# Patient Record
Sex: MALE | Race: WHITE
[De-identification: names, ages, dates, MRNs, and addresses within clinical notes are randomized per-mention and may not be internally consistent; named-entity substitution may affect disease eponyms.]

---

## 2017-01-12 ENCOUNTER — HOSPITAL ENCOUNTER (EMERGENCY)
Dept: HOSPITAL 47 - EC | Age: 67
Discharge: HOME | End: 2017-01-12
Payer: COMMERCIAL

## 2017-01-12 VITALS — RESPIRATION RATE: 16 BRPM | DIASTOLIC BLOOD PRESSURE: 77 MMHG | HEART RATE: 74 BPM | SYSTOLIC BLOOD PRESSURE: 158 MMHG

## 2017-01-12 VITALS — TEMPERATURE: 98 F

## 2017-01-12 DIAGNOSIS — Z79.82: ICD-10-CM

## 2017-01-12 DIAGNOSIS — Z85.828: ICD-10-CM

## 2017-01-12 DIAGNOSIS — Z98.61: ICD-10-CM

## 2017-01-12 DIAGNOSIS — Z79.899: ICD-10-CM
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
## 2017-01-12 DIAGNOSIS — M54.5: Primary | ICD-10-CM

## 2017-01-12 LAB
PH UR: 5.5 [PH] (ref 5–8)
SP GR UR: 1.01 (ref 1–1.03)
UA BILLING (MACRO VS. MICRO): (no result)
UROBILINOGEN UR QL STRIP: <2 MG/DL (ref ?–2)

## 2017-01-12 PROCEDURE — 99283 EMERGENCY DEPT VISIT LOW MDM: CPT

## 2017-01-12 PROCEDURE — 81003 URINALYSIS AUTO W/O SCOPE: CPT

## 2017-01-12 NOTE — ED
General Adult HPI





- General


Chief complaint: Back Pain/Injury


Stated complaint: Fall-Back Injury


Time Seen by Provider: 01/12/17 19:07


Source: patient, family, RN notes reviewed


Mode of arrival: wheelchair


Limitations: no limitations





- History of Present Illness


Initial comments: 





66-year-old male presenting for right lower back pain.  Patient states that he 

tripped while he was getting out of his van and fell hitting the side step on 

side of his vehicle.  He was able to ambulate afterwards.  However since this 

time he said significant pain in the right lower back.  Denies any radiation 

the pain in the legs.  He denies any saddle paresthesias or urinary 

incontinence.  He denies any head or neck injury or loss of consciousness.  He 

is not on any blood thinners.





- Related Data


 Home Medications











 Medication  Instructions  Recorded  Confirmed


 


Atenolol 100 mg PO BID 06/08/15 01/12/17


 


Benazepril HCl 40 mg PO DAILY 06/08/15 01/12/17


 


Aspirin 325 mg PO HS 01/12/17 01/12/17


 


Hydrochlorothiazide 25 mg PO DAILY 01/12/17 01/12/17








 Previous Rx's











 Medication  Instructions  Recorded


 


Diazepam [Valium] 5 mg PO BID PRN #8 tab 01/12/17


 


HYDROcodone/APAP 5-325MG [Norco 1 tab PO Q6HR PRN #12 tab 01/12/17





5-325]  


 


Ibuprofen [Motrin] 800 mg PO Q8HR PRN #21 tab 01/12/17











 Allergies











Allergy/AdvReac Type Severity Reaction Status Date / Time


 


No Known Allergies Allergy   Verified 01/12/17 19:19














Review of Systems


ROS Statement: 


Those systems with pertinent positive or pertinent negative responses have been 

documented in the HPI.





ROS Other: All systems not noted in ROS Statement are negative.





Past Medical History


Past Medical History: Cancer, Hypertension


Additional Past Medical History / Comment(s): HX SKIN CA, CURRENT LESION ON 

LEFT CHEEK


History of Any Multi-Drug Resistant Organisms: None Reported


Past Surgical History: Back Surgery, Heart Catheterization, Joint Replacement, 

Orthopedic Surgery


Additional Past Surgical History / Comment(s): PREVIOUS SKIN CA LESIONS REMOVED

, HIP REPLACEMENT, ANKLE ORIF WITH METAL


Past Anesthesia/Blood Transfusion Reactions: No Reported Reaction


Past Psychological History: No Psychological Hx Reported


Smoking Status: Never smoker


Past Alcohol Use History: Daily


Past Drug Use History: None Reported





- Past Family History


  ** Mother


Family Medical History: Cancer, Deep Vein Thrombosis (DVT)


Additional Family Medical History / Comment(s): BREAST CA





General Exam





- General Exam Comments


Initial Comments: 





General:  Awake and Alert. No acute distress. Does not appear acutely ill.


Eyes: AYSHA, EOM intact. No nystagmus. No scleral icterus.  


HENT: Atraumatic, normocephalic. Mucous membranes moist. Trachea midline.


Neck: The neck is supple, there is no tenderness or JVD.  


Cardiovascular:  Regular rate and rhythm. No murmur, rub, or gallop is 

appreciated. Distal pulses intact.


Respiratory:  Lungs are clear to auscultation bilaterally. No wheezes, rales, 

rhonchi. No respiratory distress.


Gastrointestinal:  Soft, Nontender. No rebound or guarding. Non-distended. No 

masses or organomegaly noted.  Right flank tenderness.


Musculoskeletal:  Right lower back tenderness with spasm otherwise MSK exam 

with normal ROM. No gross deformity. No strength deficits. 


Neurological: A&Ox3. CN II-XII grossly intact, There are no obvious motor or 

sensory deficits. Coordination appears grossly intact. Speech is normal.  Gait 

intact.  No saddle paresthesias.


Skin: Skin is warm and dry and no rashes or lesions are noted. 


Psychiatric: Cooperative, appropriate mood & affect, normal judgment.  


Limitations: no limitations





Course


 Vital Signs











  01/12/17 01/12/17





  19:00 20:15


 


Temperature 98.0 F 


 


Pulse Rate 51 L 74


 


Respiratory 18 16





Rate  


 


Blood Pressure 184/86 158/77


 


O2 Sat by Pulse 99 97





Oximetry  














Medical Decision Making





- Medical Decision Making





66-year-old male presenting for right lower back injury.  Patient with muscle 

spasm associated with this.  There is no significant force directly in the 

spine requiring imaging at this time.  However, UA performed to rule out 

hematuria for possible kidney injury.  This is negative.  Patient was given 

pain medicine and muscle relaxer with improvement of his symptoms.  Discussed 

no heavy lifting.  Discussed heat, NSAIDs, stretching.  Discussed close follow-

up with his PCP.  Discussed concerning signs and symptoms for immediate return 

to ED.  Patient and wife are agreeable with plan of discharge home.  Patient 

was able to ambulate without issue.





- Lab Data


 Lab Results











  01/12/17 Range/Units





  19:41 


 


Urine Color  Yellow  


 


Urine Appearance  Clear  (Clear)  


 


Urine pH  5.5  (5.0-8.0)  


 


Ur Specific Gravity  1.008  (1.001-1.035)  


 


Urine Protein  Negative  (Negative)  


 


Urine Glucose (UA)  Negative  (Negative)  


 


Urine Ketones  Negative  (Negative)  


 


Urine Blood  Negative  (Negative)  


 


Urine Nitrate  Negative  (Negative)  


 


Urine Bilirubin  Negative  (Negative)  


 


Urine Urobilinogen  <2.0  (<2.0)  mg/dL


 


Ur Leukocyte Esterase  Negative  (Negative)  














Disposition


Clinical Impression: 


 Right low back pain, Fall





Disposition: HOME SELF-CARE


Condition: Stable


Instructions:  Acute Low Back Pain (ED)


Prescriptions: 


Diazepam [Valium] 5 mg PO BID PRN #8 tab


 PRN Reason: back spasm


HYDROcodone/APAP 5-325MG [Norco 5-325] 1 tab PO Q6HR PRN #12 tab


 PRN Reason: Pain


Ibuprofen [Motrin] 800 mg PO Q8HR PRN #21 tab


 PRN Reason: Pain


Referrals: 


Mina Mata DO [Primary Care Provider] - 1-2 days


Time of Disposition: 20:06

## 2017-06-12 ENCOUNTER — HOSPITAL ENCOUNTER (OUTPATIENT)
Dept: HOSPITAL 47 - RADXRMAIN | Age: 67
Discharge: HOME | End: 2017-06-12
Payer: MEDICARE

## 2017-06-12 DIAGNOSIS — J20.8: Primary | ICD-10-CM

## 2017-06-12 PROCEDURE — 71020: CPT

## 2017-06-12 NOTE — XR
EXAMINATION TYPE: XR chest 2V

 

DATE OF EXAM: 6/12/2017

 

COMPARISON: NONE

 

HISTORY: Pneumonia, acute bronchitis, J 20.8

 

TECHNIQUE:  Frontal and lateral views of the chest are obtained.

 

FINDINGS:  Patient is rotated. Airspace disease is present in the right lower lobe. Prominent lung vo
lumes may be indicative of COPD. No pneumothorax or pleural effusion. Heart size accentuated by rotat
ion. There may be a spinal curvature.

 

IMPRESSION:  Findings compatible with right lower lobe pneumonia, follow-up to resolution.

## 2017-12-19 ENCOUNTER — HOSPITAL ENCOUNTER (OUTPATIENT)
Dept: HOSPITAL 47 - RADXRMAIN | Age: 67
Discharge: HOME | End: 2017-12-19
Payer: MEDICARE

## 2017-12-19 DIAGNOSIS — R91.8: Primary | ICD-10-CM

## 2017-12-19 PROCEDURE — 71020: CPT

## 2017-12-19 NOTE — XR
EXAMINATION TYPE: XR chest 2V

 

DATE OF EXAM: 12/19/2017

 

COMPARISON: 12/18/2017

 

TECHNIQUE: PA and lateral views submitted with nipple markers.

 

HISTORY: Cough

 

FINDINGS:

The lungs are clear and  there is no pneumothorax, pleural effusion, or focal pneumonia.  Hypertrophi
c and degenerative change of the spine noted. Linear change right upper lobe scar or atelectasis.

 

Nodularity noted in the previous exam corresponds to nipple marker\shadow.

Hypertrophic degenerative change of the spine with compression deformities appearing to be stable.

 

IMPRESSION: 

1. No acute process.

2. Nodularity at the right lung base corresponds to the nipple marker.

## 2018-03-20 ENCOUNTER — HOSPITAL ENCOUNTER (OUTPATIENT)
Dept: HOSPITAL 47 - LABWHC1 | Age: 68
Discharge: HOME | End: 2018-03-20
Attending: OTOLARYNGOLOGY
Payer: MEDICARE

## 2018-03-20 DIAGNOSIS — I10: ICD-10-CM

## 2018-03-20 DIAGNOSIS — R00.1: ICD-10-CM

## 2018-03-20 DIAGNOSIS — R94.31: Primary | ICD-10-CM

## 2018-03-20 PROCEDURE — 36415 COLL VENOUS BLD VENIPUNCTURE: CPT

## 2018-03-20 PROCEDURE — 93005 ELECTROCARDIOGRAM TRACING: CPT

## 2019-08-02 ENCOUNTER — HOSPITAL ENCOUNTER (OUTPATIENT)
Dept: HOSPITAL 47 - RADXRMAIN | Age: 69
End: 2019-08-02
Attending: FAMILY MEDICINE
Payer: MEDICARE

## 2019-08-02 DIAGNOSIS — I10: Primary | ICD-10-CM

## 2019-08-02 PROCEDURE — 71046 X-RAY EXAM CHEST 2 VIEWS: CPT

## 2019-08-02 NOTE — XR
EXAMINATION TYPE: XR chest 2V

 

DATE OF EXAM: 8/2/2019

 

COMPARISON: 12/19/2017

 

TECHNIQUE: PA and lateral views submitted.

 

HISTORY: Annual physical

 

FINDINGS:

The lungs are clear and  there is no pneumothorax, pleural effusion, or focal pneumonia.  There is re
duced inspiration. No overt failure. Biapical pleural thickening. Arthropathy of the shoulders. Hyper
trophic change of the spine. Chronic appearing wedge deformity of the thoracolumbar junction stable.

 

IMPRESSION: 

1. No acute process.

## 2019-08-21 ENCOUNTER — HOSPITAL ENCOUNTER (OUTPATIENT)
Dept: HOSPITAL 47 - RADCTMAIN | Age: 69
Discharge: HOME | End: 2019-08-21
Attending: OTOLARYNGOLOGY
Payer: MEDICARE

## 2019-08-21 DIAGNOSIS — R22.1: Primary | ICD-10-CM

## 2019-08-21 LAB — BUN SERPL-SCNC: 12 MG/DL (ref 9–20)

## 2019-08-21 PROCEDURE — 82565 ASSAY OF CREATININE: CPT

## 2019-08-21 PROCEDURE — 84520 ASSAY OF UREA NITROGEN: CPT

## 2019-08-21 PROCEDURE — 70491 CT SOFT TISSUE NECK W/DYE: CPT

## 2019-08-21 PROCEDURE — 36415 COLL VENOUS BLD VENIPUNCTURE: CPT

## 2019-08-22 NOTE — CT
EXAMINATION TYPE: CT soft tissue neck w con

 

DATE OF EXAM: 8/21/2019

 

HISTORY: LT side neck mass. Pt says its grown and now is painful and causing him issues. Marked w/BB.


 

COMPARISON: NONE

 

CT DLP: 671.30 mGycm.  Automated Exposure Control for Dose Reduction was Utilized.

 

TECHNIQUE:  CT scan of the neck is performed with IV Contrast, patient injected with 100 mL of Isovue
 300, axial images are obtained, coronal and sagittal reformatted images are reviewed.

 

FINDINGS:

 

Airway: There is spiculated right upper lobe mass measuring 3.8 x 2.2 cm anteriorly axial image 19. T
here is second spiculated right upper lobe nodule measuring 2.1 x 1.8 cm axial image 25.

 

Parotid/submandibular glands:  No gross abnormality seen.

 

Carotid/Vascular Structures: Mild calcified plaque bilateral carotid bulbs without significant stenos
is. Incidental dominant left vertebral artery. 

 

Osseous Structures: Mild-to-moderate multilevel spurring. There is mild to moderate anterior compress
ion deformity T6 level sagittal image 48 favor chronic. 

 

Other: Corresponding to palpable abnormality axial image 58 there is cystic lesion left neck posterio
r cervical triangle adjacent to the C4 vertebra posterior to SCM measuring 4.8 x 4.2 cm AP diameter b
y 3.7 cm craniocaudal diameter and sagittal image 66. The lesion has some rim and septal enhancement 
with slight loculation. Lesion is posterior lateral to the carotid bifurcation. There are prominent b
ut subcentimeter bilateral neck lymph nodes seen superior to this. There is however suspicious left n
erik low dense lesion inferior lateral to this measuring 1.8 x 1.6 cm a level of vocal cord just anter
ior to the scapula, Hounsfield units average 25.

 

IMPRESSION:

1. There are 2 suspicious right upper lung nodules and/or masses worrisome for metastatic malignancy.
 Advise follow-up contrast enhanced chest CT to evaluate for possible additional nodules and/or thora
cic adenopathy.

2. Confirmation of mass level of palpable abnormality left neck posterior cervical triangle. Differen
tial includes lymphangioma and infected brachial cleft cyst however neoplasm needs to be considered. 
Necrotic lymph node and neurogenic tumor are in the differential. Follow-up advised. Metastatic disea
se suspected. Consider PET CT follow-up. Consider sampling of left supraclavicular lesion for tissue 
analysis.

## 2019-08-30 ENCOUNTER — HOSPITAL ENCOUNTER (OUTPATIENT)
Dept: HOSPITAL 47 - RADCTMAIN | Age: 69
Discharge: HOME | End: 2019-08-30
Attending: OTOLARYNGOLOGY
Payer: MEDICARE

## 2019-08-30 DIAGNOSIS — R91.8: Primary | ICD-10-CM

## 2019-08-30 PROCEDURE — 71260 CT THORAX DX C+: CPT

## 2019-08-30 NOTE — CT
EXAMINATION TYPE: CT chest w con

 

DATE OF EXAM: 8/30/2019

 

COMPARISON: CT neck 8/21/2019

 

HISTORY: Lung Mass

 

CT DLP: 423.3 mGycm, Automated exposure control for dose reduction was used.

 

CONTRAST: Performed injected with 100 mL of Isovue 300.

 

TECHNIQUE: Axial images were obtained at 5 mm thick sections.  Reconstructed images are reviewed on [employer] computer in the coronal plane. 

 

FINDINGS: Portion of the thyroid visualized is normal.

 

There is a 4.1 x 2.9 cm spiculated density within the anterior right upper lobe. Series 4 image 15. L
obular nodule spiculated margins measuring 2.3 x 2.0 cm cyst in the right peripheral upper lobe. Seri
es 4 image 13. There are some streak opacities which connect these 2 areas. Coronary artery calcifica
tion is present. No suspicious enlarged mediastinal or hilar lymphadenopathy is evident.

 

No enlarged mediastinal or hilar adenopathy is evident.   The ascending aorta diameter at the level o
f the main pulmonary artery is 3.7 cm.  The main pulmonary artery diameter at the bifurcation is 2.1 
cm.

 

Limited CT sections are obtained through the upper abdomen. Abdomen is essentially unremarkable.

 

IMPRESSIONS:

1. 2 suspicious spiculated masses within the right upper lobe. Additional workup for neoplasm is rafiq
mmended.

## 2019-09-11 ENCOUNTER — HOSPITAL ENCOUNTER (EMERGENCY)
Dept: HOSPITAL 47 - EC | Age: 69
LOS: 1 days | Discharge: HOME | End: 2019-09-12
Payer: MEDICARE

## 2019-09-11 VITALS — RESPIRATION RATE: 18 BRPM

## 2019-09-11 DIAGNOSIS — Z96.649: ICD-10-CM

## 2019-09-11 DIAGNOSIS — Z79.899: ICD-10-CM

## 2019-09-11 DIAGNOSIS — E87.1: ICD-10-CM

## 2019-09-11 DIAGNOSIS — I10: ICD-10-CM

## 2019-09-11 DIAGNOSIS — Z79.82: ICD-10-CM

## 2019-09-11 DIAGNOSIS — R91.8: ICD-10-CM

## 2019-09-11 DIAGNOSIS — Z85.828: ICD-10-CM

## 2019-09-11 DIAGNOSIS — Z95.5: ICD-10-CM

## 2019-09-11 DIAGNOSIS — J40: Primary | ICD-10-CM

## 2019-09-11 LAB
ALBUMIN SERPL-MCNC: 4.2 G/DL (ref 3.5–5)
ALP SERPL-CCNC: 49 U/L (ref 38–126)
ALT SERPL-CCNC: 26 U/L (ref 21–72)
ANION GAP SERPL CALC-SCNC: 17 MMOL/L
APTT BLD: 21.4 SEC (ref 22–30)
AST SERPL-CCNC: 41 U/L (ref 17–59)
BASOPHILS # BLD AUTO: 0.1 K/UL (ref 0–0.2)
BASOPHILS NFR BLD AUTO: 1 %
BUN SERPL-SCNC: 10 MG/DL (ref 9–20)
CALCIUM SPEC-MCNC: 8.6 MG/DL (ref 8.4–10.2)
CHLORIDE SERPL-SCNC: 91 MMOL/L (ref 98–107)
CO2 SERPL-SCNC: 21 MMOL/L (ref 22–30)
EOSINOPHIL # BLD AUTO: 0.1 K/UL (ref 0–0.7)
EOSINOPHIL NFR BLD AUTO: 1 %
ERYTHROCYTE [DISTWIDTH] IN BLOOD BY AUTOMATED COUNT: 4.66 M/UL (ref 4.3–5.9)
ERYTHROCYTE [DISTWIDTH] IN BLOOD: 17.1 % (ref 11.5–15.5)
GLUCOSE SERPL-MCNC: 95 MG/DL (ref 74–99)
HCT VFR BLD AUTO: 33.4 % (ref 39–53)
HGB BLD-MCNC: 10.2 GM/DL (ref 13–17.5)
INR PPP: 0.9 (ref ?–1.2)
LYMPHOCYTES # SPEC AUTO: 1.4 K/UL (ref 1–4.8)
LYMPHOCYTES NFR SPEC AUTO: 16 %
MCH RBC QN AUTO: 21.9 PG (ref 25–35)
MCHC RBC AUTO-ENTMCNC: 30.5 G/DL (ref 31–37)
MCV RBC AUTO: 71.6 FL (ref 80–100)
MONOCYTES # BLD AUTO: 0.5 K/UL (ref 0–1)
MONOCYTES NFR BLD AUTO: 6 %
NEUTROPHILS # BLD AUTO: 6.5 K/UL (ref 1.3–7.7)
NEUTROPHILS NFR BLD AUTO: 74 %
PLATELET # BLD AUTO: 326 K/UL (ref 150–450)
POTASSIUM SERPL-SCNC: 3.7 MMOL/L (ref 3.5–5.1)
PROT SERPL-MCNC: 7.8 G/DL (ref 6.3–8.2)
PT BLD: 10.2 SEC (ref 9–12)
SODIUM SERPL-SCNC: 129 MMOL/L (ref 137–145)
WBC # BLD AUTO: 8.8 K/UL (ref 3.8–10.6)

## 2019-09-11 PROCEDURE — 71046 X-RAY EXAM CHEST 2 VIEWS: CPT

## 2019-09-11 PROCEDURE — 83880 ASSAY OF NATRIURETIC PEPTIDE: CPT

## 2019-09-11 PROCEDURE — 99285 EMERGENCY DEPT VISIT HI MDM: CPT

## 2019-09-11 PROCEDURE — 85610 PROTHROMBIN TIME: CPT

## 2019-09-11 PROCEDURE — 85730 THROMBOPLASTIN TIME PARTIAL: CPT

## 2019-09-11 PROCEDURE — 93005 ELECTROCARDIOGRAM TRACING: CPT

## 2019-09-11 PROCEDURE — 94640 AIRWAY INHALATION TREATMENT: CPT

## 2019-09-11 PROCEDURE — 85025 COMPLETE CBC W/AUTO DIFF WBC: CPT

## 2019-09-11 PROCEDURE — 36415 COLL VENOUS BLD VENIPUNCTURE: CPT

## 2019-09-11 PROCEDURE — 80053 COMPREHEN METABOLIC PANEL: CPT

## 2019-09-11 PROCEDURE — 84484 ASSAY OF TROPONIN QUANT: CPT

## 2019-09-11 PROCEDURE — 96360 HYDRATION IV INFUSION INIT: CPT

## 2019-09-11 NOTE — ED
SOB HPI





- General


Chief Complaint: Shortness of Breath


Stated Complaint: Diff Breathing


Time Seen by Provider: 19 22:07


Source: EMS


Mode of arrival: EMS





- History of Present Illness


Initial Comments: 





This patient is a 69-year-old man who presents to be evaluated for which he is 

terming a coughing jag.  Patient states that approximately 2 hours before 

arrival, while at the bar, he developed "coughing jag," which triggered an 

episode of vomiting and he also felt like he was not able to catch his breath.  

When it continued he spoke with his wife and they decided to come here for 

evaluation.  The patient's pulmonary history is notable that he is been worked 

up for possible lung mass, and has appointments coming with pulmonologist as 

well as ENT for a neck mass.  The patient denies fever or chills.  He does have 

sporadic cough but states not really productive.  No chest pain area no real 

dyspnea other than during the coughing episode.  No leg pain or swelling.  No 

change in bowel movements or urination.


MD Complaint: shortness of breath, cough


Onset/Timin


-: hour(s)


Severity scale (1-10): 0


Improves With: oxygen


Worsens With: nothing


Associated Symptoms: denies other symptoms


Treatments Prior to Arrival: none





- Related Data


                                Home Medications











 Medication  Instructions  Recorded  Confirmed


 


Benazepril HCl 40 mg PO DAILY 06/08/15 09/11/19


 


Aspirin 325 mg PO HS 17


 


Hydrochlorothiazide 25 mg PO DAILY 17


 


Atenolol [Tenormin] 100 mg PO BID 19








                                  Previous Rx's











 Medication  Instructions  Recorded


 


Albuterol Inhaler [Ventolin Hfa 1 - 2 puff INHALATION Q6HR PRN #1 19





Inhaler] inhaler 


 


predniSONE 40 mg PO DAILY #30 tab 19











                                    Allergies











Allergy/AdvReac Type Severity Reaction Status Date / Time


 


No Known Allergies Allergy   Verified 19 22:23














Review of Systems


ROS Statement: 


Those systems with pertinent positive or pertinent negative responses have been 

documented in the HPI.





ROS Other: All systems not noted in ROS Statement are negative.


Constitutional: Denies: fever, chills


ENT: Denies: throat pain


Respiratory: Reports: as per HPI, cough, dyspnea.  Denies: wheezes, hemoptysis


Cardiovascular: Denies: chest pain, palpitations, dyspnea on exertion, 

orthopnea, edema, syncope


Gastrointestinal: Reports: as per HPI, vomiting.  Denies: abdominal pain, 

nausea, melena, hematochezia


Genitourinary: Denies: dysuria, hematuria


Musculoskeletal: Denies: back pain


Skin: Denies: rash


Neurological: Denies: headache, weakness, numbness





Past Medical History


Past Medical History: Cancer, Hypertension


Additional Past Medical History / Comment(s): HX SKIN CA, CURRENT LESION ON LEFT

CHEEK


History of Any Multi-Drug Resistant Organisms: None Reported


Past Surgical History: Back Surgery, Heart Catheterization, Joint Replacement, 

Orthopedic Surgery


Additional Past Surgical History / Comment(s): PREVIOUS SKIN CA LESIONS REMOVED,

HIP REPLACEMENT, ANKLE ORIF WITH METAL


Past Anesthesia/Blood Transfusion Reactions: No Reported Reaction


Past Psychological History: No Psychological Hx Reported


Smoking Status: Never smoker


Past Alcohol Use History: Daily


Past Drug Use History: None Reported





- Past Family History


  ** Mother


Family Medical History: Cancer, Deep Vein Thrombosis (DVT)


Additional Family Medical History / Comment(s): BREAST CA





General Exam


General appearance: alert, in no apparent distress


Head exam: Present: atraumatic, normocephalic


Eye exam: Present: normal appearance.  Absent: scleral icterus, conjunctival 

injection


ENT exam: Present: normal oropharynx


Neck exam: Present: other (The patient has an approximately 6 cm x 3 cm palpable

mass at the base of the neck on the left side.  There is some erythema and sca

ling overlying.  There is no real tenderness.)


Respiratory exam: Present: wheezes.  Absent: respiratory distress, rales, 

rhonchi, stridor, accessory muscle use, decreased breath sounds, prolonged 

expiratory


Cardiovascular Exam: Present: regular rate, normal rhythm, normal heart sounds. 

Absent: systolic murmur, diastolic murmur, rubs, gallop


GI/Abdominal exam: Present: soft.  Absent: distended, tenderness, guarding, 

rebound, rigid, mass


Extremities exam: Present: normal inspection, normal capillary refill.  Absent: 

pedal edema, calf tenderness


Back exam: Present: normal inspection.  Absent: CVA tenderness (R), CVA 

tenderness (L)


Neurological exam: Present: alert


Skin exam: Present: warm, dry, intact, normal color.  Absent: rash





Course


                                   Vital Signs











  19





  22:09 22:45 23:13


 


Temperature 98.3 F  


 


Pulse Rate 82  88


 


Respiratory 18 20 18





Rate   


 


Blood Pressure 169/110  


 


O2 Sat by Pulse 98  





Oximetry   














  19





  23:19


 


Temperature 


 


Pulse Rate 90


 


Respiratory 18





Rate 


 


Blood Pressure 


 


O2 Sat by Pulse 





Oximetry 














Medical Decision Making





- Lab Data


Result diagrams: 


                                 19 22:50





                                 19 22:50


                                   Lab Results











  19 Range/Units





  22:50 22:50 22:50 


 


WBC  8.8    (3.8-10.6)  k/uL


 


RBC  4.66    (4.30-5.90)  m/uL


 


Hgb  10.2 L    (13.0-17.5)  gm/dL


 


Hct  33.4 L    (39.0-53.0)  %


 


MCV  71.6 L    (80.0-100.0)  fL


 


MCH  21.9 L    (25.0-35.0)  pg


 


MCHC  30.5 L    (31.0-37.0)  g/dL


 


RDW  17.1 H    (11.5-15.5)  %


 


Plt Count  326    (150-450)  k/uL


 


Neutrophils %  74    %


 


Lymphocytes %  16    %


 


Monocytes %  6    %


 


Eosinophils %  1    %


 


Basophils %  1    %


 


Neutrophils #  6.5    (1.3-7.7)  k/uL


 


Lymphocytes #  1.4    (1.0-4.8)  k/uL


 


Monocytes #  0.5    (0-1.0)  k/uL


 


Eosinophils #  0.1    (0-0.7)  k/uL


 


Basophils #  0.1    (0-0.2)  k/uL


 


Hypochromasia  Moderate    


 


Anisocytosis  Slight    


 


Microcytosis  Moderate    


 


PT     (9.0-12.0)  sec


 


INR     (<1.2)  


 


APTT     (22.0-30.0)  sec


 


Sodium   129 L   (137-145)  mmol/L


 


Potassium   3.7   (3.5-5.1)  mmol/L


 


Chloride   91 L   ()  mmol/L


 


Carbon Dioxide   21 L   (22-30)  mmol/L


 


Anion Gap   17   mmol/L


 


BUN   10   (9-20)  mg/dL


 


Creatinine   0.83   (0.66-1.25)  mg/dL


 


Est GFR (CKD-EPI)AfAm   >90   (>60 ml/min/1.73 sqM)  


 


Est GFR (CKD-EPI)NonAf   >90   (>60 ml/min/1.73 sqM)  


 


Glucose   95   (74-99)  mg/dL


 


Calcium   8.6   (8.4-10.2)  mg/dL


 


Total Bilirubin   0.6   (0.2-1.3)  mg/dL


 


AST   41   (17-59)  U/L


 


ALT   26   (21-72)  U/L


 


Alkaline Phosphatase   49   ()  U/L


 


Troponin I     (0.000-0.034)  ng/mL


 


NT-Pro-B Natriuret Pep    110  pg/mL


 


Total Protein   7.8   (6.3-8.2)  g/dL


 


Albumin   4.2   (3.5-5.0)  g/dL














  19 Range/Units





  22:50 22:50 


 


WBC    (3.8-10.6)  k/uL


 


RBC    (4.30-5.90)  m/uL


 


Hgb    (13.0-17.5)  gm/dL


 


Hct    (39.0-53.0)  %


 


MCV    (80.0-100.0)  fL


 


MCH    (25.0-35.0)  pg


 


MCHC    (31.0-37.0)  g/dL


 


RDW    (11.5-15.5)  %


 


Plt Count    (150-450)  k/uL


 


Neutrophils %    %


 


Lymphocytes %    %


 


Monocytes %    %


 


Eosinophils %    %


 


Basophils %    %


 


Neutrophils #    (1.3-7.7)  k/uL


 


Lymphocytes #    (1.0-4.8)  k/uL


 


Monocytes #    (0-1.0)  k/uL


 


Eosinophils #    (0-0.7)  k/uL


 


Basophils #    (0-0.2)  k/uL


 


Hypochromasia    


 


Anisocytosis    


 


Microcytosis    


 


PT  10.2   (9.0-12.0)  sec


 


INR  0.9   (<1.2)  


 


APTT  21.4 L   (22.0-30.0)  sec


 


Sodium    (137-145)  mmol/L


 


Potassium    (3.5-5.1)  mmol/L


 


Chloride    ()  mmol/L


 


Carbon Dioxide    (22-30)  mmol/L


 


Anion Gap    mmol/L


 


BUN    (9-20)  mg/dL


 


Creatinine    (0.66-1.25)  mg/dL


 


Est GFR (CKD-EPI)AfAm    (>60 ml/min/1.73 sqM)  


 


Est GFR (CKD-EPI)NonAf    (>60 ml/min/1.73 sqM)  


 


Glucose    (74-99)  mg/dL


 


Calcium    (8.4-10.2)  mg/dL


 


Total Bilirubin    (0.2-1.3)  mg/dL


 


AST    (17-59)  U/L


 


ALT    (21-72)  U/L


 


Alkaline Phosphatase    ()  U/L


 


Troponin I   <0.012  (0.000-0.034)  ng/mL


 


NT-Pro-B Natriuret Pep    pg/mL


 


Total Protein    (6.3-8.2)  g/dL


 


Albumin    (3.5-5.0)  g/dL














- EKG Data


EKG shows normal: sinus rhythm, axis (Normal), intervals (Normal), ST-T waves 

(Normal)


Rate: normal (Rate 85 bpm)


Interpretation: other (Q waves in lead 3 for possible old inferior infarct.)





Disposition


Clinical Impression: 


 Bronchitis, Lung mass, Hyponatremia





Disposition: HOME SELF-CARE


Condition: Good


Instructions (If sedation given, give patient instructions):  Bronchospasm (ED),

Hyponatremia (ED)


Prescriptions: 


predniSONE 40 mg PO DAILY #30 tab


Albuterol Inhaler [Ventolin Hfa Inhaler] 1 - 2 puff INHALATION Q6HR PRN #1 

inhaler


 PRN Reason: Wheezing


Is patient prescribed a controlled substance at d/c from ED?: No


Referrals: 


Mina Mata DO [Primary Care Provider] - 1-2 days

## 2019-09-11 NOTE — XR
EXAM:

  XR Chest, 2 Views

 

CLINICAL HISTORY:

  ITS.REASON XR Reason: difficulty breathing

 

TECHNIQUE:

  Frontal and lateral views of the chest.

 

COMPARISON:

  08/02/19

 

FINDINGS:

  Lungs:  Low lung volumes.  Streaky density in the right upper lung is 

likely scarring.

  Pleural space:  Unremarkable.  No pneumothorax.

  Heart:  Unremarkable.  No cardiomegaly.

  Mediastinum:  Unremarkable.

  Bones/joints:  Unremarkable.

 

IMPRESSION:     

  No acute findings.

## 2019-09-12 VITALS — HEART RATE: 79 BPM | SYSTOLIC BLOOD PRESSURE: 141 MMHG | TEMPERATURE: 98 F | DIASTOLIC BLOOD PRESSURE: 83 MMHG

## 2019-09-18 ENCOUNTER — HOSPITAL ENCOUNTER (OUTPATIENT)
Dept: HOSPITAL 47 - CPPFTMAIN | Age: 69
Discharge: HOME | End: 2019-09-18
Attending: INTERNAL MEDICINE
Payer: MEDICARE

## 2019-09-18 DIAGNOSIS — J44.9: Primary | ICD-10-CM

## 2019-09-18 PROCEDURE — 94060 EVALUATION OF WHEEZING: CPT

## 2019-09-18 PROCEDURE — 94726 PLETHYSMOGRAPHY LUNG VOLUMES: CPT

## 2019-09-18 PROCEDURE — 94729 DIFFUSING CAPACITY: CPT

## 2019-09-26 ENCOUNTER — HOSPITAL ENCOUNTER (OUTPATIENT)
Dept: HOSPITAL 47 - ORWHC2ENDO | Age: 69
Discharge: HOME | End: 2019-09-26
Attending: INTERNAL MEDICINE
Payer: MEDICARE

## 2019-09-26 VITALS — DIASTOLIC BLOOD PRESSURE: 70 MMHG | HEART RATE: 79 BPM | SYSTOLIC BLOOD PRESSURE: 114 MMHG

## 2019-09-26 VITALS — RESPIRATION RATE: 18 BRPM

## 2019-09-26 VITALS — TEMPERATURE: 97.5 F

## 2019-09-26 DIAGNOSIS — R22.1: ICD-10-CM

## 2019-09-26 DIAGNOSIS — C44.41: ICD-10-CM

## 2019-09-26 DIAGNOSIS — C34.11: Primary | ICD-10-CM

## 2019-09-26 DIAGNOSIS — Z96.641: ICD-10-CM

## 2019-09-26 DIAGNOSIS — Z79.51: ICD-10-CM

## 2019-09-26 DIAGNOSIS — I10: ICD-10-CM

## 2019-09-26 DIAGNOSIS — Z77.090: ICD-10-CM

## 2019-09-26 DIAGNOSIS — Z79.82: ICD-10-CM

## 2019-09-26 DIAGNOSIS — Z79.899: ICD-10-CM

## 2019-09-26 DIAGNOSIS — J42: ICD-10-CM

## 2019-09-26 DIAGNOSIS — J84.10: ICD-10-CM

## 2019-09-26 PROCEDURE — 31627 NAVIGATIONAL BRONCHOSCOPY: CPT

## 2019-09-26 PROCEDURE — 31628 BRONCHOSCOPY/LUNG BX EACH: CPT

## 2019-09-26 PROCEDURE — 71250 CT THORAX DX C-: CPT

## 2019-09-26 PROCEDURE — 88305 TISSUE EXAM BY PATHOLOGIST: CPT

## 2019-09-26 PROCEDURE — 31625 BRONCHOSCOPY W/BIOPSY(S): CPT

## 2019-09-26 PROCEDURE — 71045 X-RAY EXAM CHEST 1 VIEW: CPT

## 2019-09-26 NOTE — CT
EXAMINATION TYPE: CT Chest wo con [facility] Protocol

 

DATE OF EXAM: 9/26/2019

 

COMPARISON: Soft tissue neck 8/21/2019, CT chest 8/30/2019

 

HISTORY: pulmonary nodule

 

CT DLP: 524 mGycm, Automated exposure control for dose reduction was used.

 

CONTRAST: Performed injected with 0 mL of Isovue 300.

 

TECHNIQUE: Axial images were obtained at 5 mm thick sections.  Reconstructed images are reviewed on [employer] computer in the coronal plane. 

 

FINDINGS: Portion of the thyroid visualized is normal.

 

There is a 2.0 x 2.2 cm lobulated mass in the right apex. Series 11 image 18 a spiculated mass measur
ing 2.6 cm in diameters and the anterior right upper lung field. Series 11 image 21. There is a large
 left supraclavicular hypodensity likely is an enlarged metastatic lymph node measuring 4.6 cm. Serie
s 10 image 1. Additional lymph node in the supraclavicular region on the left measuring 1.5 cm. Serie
s 10 image 4. Enlarged mediastinal lymph nodes are not evident. No suspicious hilar lymph nodes are e
vident

 

No enlarged mediastinal or hilar adenopathy is evident.   The ascending aorta diameter at the level o
f the main pulmonary artery is 3.9 cm.  The main pulmonary artery diameter at the bifurcation is 2.4 
cm. Coronary artery calcifications present.

 

Limited CT sections are obtained through the upper abdomen. Abdomen is essentially unremarkable.

 

IMPRESSIONS:

1. 2 masses right upper lobe.

2. Enlarged left supraclavicular adenopathy. The largest measuring 4.6 cm is only partially visualize
d.

## 2019-09-26 NOTE — XR
EXAMINATION TYPE: XR chest 1V portable

 

DATE OF EXAM: 9/26/2019

 

COMPARISON: Chest x-ray 9/11/2019

 

HISTORY: Right upper lobe lung mass, status post lung biopsy

 

TECHNIQUE: Single frontal view of the chest is obtained.

 

FINDINGS:  Patchy density in the right upper lobe is again noted. There is no evident pneumothorax. L
skyla volumes are low and the patient is rotated. No evident effusion. Heart is stable.

 

IMPRESSION:  No evident complication status post lung biopsy.

## 2019-10-04 ENCOUNTER — HOSPITAL ENCOUNTER (INPATIENT)
Dept: HOSPITAL 47 - EC | Age: 69
LOS: 3 days | Discharge: HOME | DRG: 312 | End: 2019-10-07
Attending: INTERNAL MEDICINE | Admitting: INTERNAL MEDICINE
Payer: MEDICARE

## 2019-10-04 VITALS — BODY MASS INDEX: 29.5 KG/M2

## 2019-10-04 DIAGNOSIS — R59.9: ICD-10-CM

## 2019-10-04 DIAGNOSIS — T50.2X5A: ICD-10-CM

## 2019-10-04 DIAGNOSIS — T46.4X5A: ICD-10-CM

## 2019-10-04 DIAGNOSIS — M19.90: ICD-10-CM

## 2019-10-04 DIAGNOSIS — I95.2: Primary | ICD-10-CM

## 2019-10-04 DIAGNOSIS — I25.10: ICD-10-CM

## 2019-10-04 DIAGNOSIS — Z85.828: ICD-10-CM

## 2019-10-04 DIAGNOSIS — Z80.3: ICD-10-CM

## 2019-10-04 DIAGNOSIS — Z77.090: ICD-10-CM

## 2019-10-04 DIAGNOSIS — J98.11: ICD-10-CM

## 2019-10-04 DIAGNOSIS — Z79.899: ICD-10-CM

## 2019-10-04 DIAGNOSIS — C76.0: ICD-10-CM

## 2019-10-04 DIAGNOSIS — C34.11: ICD-10-CM

## 2019-10-04 DIAGNOSIS — Z83.2: ICD-10-CM

## 2019-10-04 DIAGNOSIS — K21.9: ICD-10-CM

## 2019-10-04 DIAGNOSIS — R05: ICD-10-CM

## 2019-10-04 DIAGNOSIS — R55: ICD-10-CM

## 2019-10-04 DIAGNOSIS — Z96.641: ICD-10-CM

## 2019-10-04 DIAGNOSIS — Z79.82: ICD-10-CM

## 2019-10-04 DIAGNOSIS — I10: ICD-10-CM

## 2019-10-04 DIAGNOSIS — D64.9: ICD-10-CM

## 2019-10-04 DIAGNOSIS — E61.1: ICD-10-CM

## 2019-10-04 LAB
ALBUMIN SERPL-MCNC: 3.8 G/DL (ref 3.5–5)
ALP SERPL-CCNC: 62 U/L (ref 38–126)
ALT SERPL-CCNC: 39 U/L (ref 21–72)
ANION GAP SERPL CALC-SCNC: 14 MMOL/L
APTT BLD: 22.8 SEC (ref 22–30)
AST SERPL-CCNC: 40 U/L (ref 17–59)
BASOPHILS # BLD AUTO: 0.1 K/UL (ref 0–0.2)
BASOPHILS NFR BLD AUTO: 1 %
BUN SERPL-SCNC: 14 MG/DL (ref 9–20)
CALCIUM SPEC-MCNC: 9.1 MG/DL (ref 8.4–10.2)
CHLORIDE SERPL-SCNC: 97 MMOL/L (ref 98–107)
CO2 SERPL-SCNC: 23 MMOL/L (ref 22–30)
EOSINOPHIL # BLD AUTO: 0.1 K/UL (ref 0–0.7)
EOSINOPHIL NFR BLD AUTO: 1 %
ERYTHROCYTE [DISTWIDTH] IN BLOOD BY AUTOMATED COUNT: 4.45 M/UL (ref 4.3–5.9)
ERYTHROCYTE [DISTWIDTH] IN BLOOD: 16.3 % (ref 11.5–15.5)
GLUCOSE SERPL-MCNC: 110 MG/DL (ref 74–99)
HCT VFR BLD AUTO: 33.3 % (ref 39–53)
HGB BLD-MCNC: 10.1 GM/DL (ref 13–17.5)
HYALINE CASTS UR QL AUTO: 4 /LPF (ref 0–2)
INR PPP: 1 (ref ?–1.2)
LYMPHOCYTES # SPEC AUTO: 1.4 K/UL (ref 1–4.8)
LYMPHOCYTES NFR SPEC AUTO: 14 %
MAGNESIUM SPEC-SCNC: 1.9 MG/DL (ref 1.6–2.3)
MCH RBC QN AUTO: 22.6 PG (ref 25–35)
MCHC RBC AUTO-ENTMCNC: 30.2 G/DL (ref 31–37)
MCV RBC AUTO: 74.8 FL (ref 80–100)
MONOCYTES # BLD AUTO: 0.6 K/UL (ref 0–1)
MONOCYTES NFR BLD AUTO: 6 %
NEUTROPHILS # BLD AUTO: 7.5 K/UL (ref 1.3–7.7)
NEUTROPHILS NFR BLD AUTO: 75 %
PH UR: 6.5 [PH] (ref 5–8)
PLATELET # BLD AUTO: 354 K/UL (ref 150–450)
POTASSIUM SERPL-SCNC: 3.8 MMOL/L (ref 3.5–5.1)
PROT SERPL-MCNC: 7.2 G/DL (ref 6.3–8.2)
PT BLD: 10.3 SEC (ref 9–12)
RBC UR QL: 97 /HPF (ref 0–5)
SODIUM SERPL-SCNC: 134 MMOL/L (ref 137–145)
SP GR UR: 1.01 (ref 1–1.03)
SQUAMOUS UR QL AUTO: 1 /HPF (ref 0–4)
UROBILINOGEN UR QL STRIP: <2 MG/DL (ref ?–2)
WBC # BLD AUTO: 9.9 K/UL (ref 3.8–10.6)

## 2019-10-04 PROCEDURE — 70553 MRI BRAIN STEM W/O & W/DYE: CPT

## 2019-10-04 PROCEDURE — 83605 ASSAY OF LACTIC ACID: CPT

## 2019-10-04 PROCEDURE — 99285 EMERGENCY DEPT VISIT HI MDM: CPT

## 2019-10-04 PROCEDURE — 82728 ASSAY OF FERRITIN: CPT

## 2019-10-04 PROCEDURE — 85025 COMPLETE CBC W/AUTO DIFF WBC: CPT

## 2019-10-04 PROCEDURE — 84484 ASSAY OF TROPONIN QUANT: CPT

## 2019-10-04 PROCEDURE — 82272 OCCULT BLD FECES 1-3 TESTS: CPT

## 2019-10-04 PROCEDURE — 83735 ASSAY OF MAGNESIUM: CPT

## 2019-10-04 PROCEDURE — 83540 ASSAY OF IRON: CPT

## 2019-10-04 PROCEDURE — 82746 ASSAY OF FOLIC ACID SERUM: CPT

## 2019-10-04 PROCEDURE — 85730 THROMBOPLASTIN TIME PARTIAL: CPT

## 2019-10-04 PROCEDURE — 70450 CT HEAD/BRAIN W/O DYE: CPT

## 2019-10-04 PROCEDURE — 80053 COMPREHEN METABOLIC PANEL: CPT

## 2019-10-04 PROCEDURE — 71275 CT ANGIOGRAPHY CHEST: CPT

## 2019-10-04 PROCEDURE — 71046 X-RAY EXAM CHEST 2 VIEWS: CPT

## 2019-10-04 PROCEDURE — 82607 VITAMIN B-12: CPT

## 2019-10-04 PROCEDURE — 74177 CT ABD & PELVIS W/CONTRAST: CPT

## 2019-10-04 PROCEDURE — 85610 PROTHROMBIN TIME: CPT

## 2019-10-04 PROCEDURE — 96360 HYDRATION IV INFUSION INIT: CPT

## 2019-10-04 PROCEDURE — 87040 BLOOD CULTURE FOR BACTERIA: CPT

## 2019-10-04 PROCEDURE — 93005 ELECTROCARDIOGRAM TRACING: CPT

## 2019-10-04 PROCEDURE — 36415 COLL VENOUS BLD VENIPUNCTURE: CPT

## 2019-10-04 PROCEDURE — 83010 ASSAY OF HAPTOGLOBIN QUANT: CPT

## 2019-10-04 PROCEDURE — 94760 N-INVAS EAR/PLS OXIMETRY 1: CPT

## 2019-10-04 PROCEDURE — 83615 LACTATE (LD) (LDH) ENZYME: CPT

## 2019-10-04 PROCEDURE — 93306 TTE W/DOPPLER COMPLETE: CPT

## 2019-10-04 PROCEDURE — 87086 URINE CULTURE/COLONY COUNT: CPT

## 2019-10-04 PROCEDURE — 86850 RBC ANTIBODY SCREEN: CPT

## 2019-10-04 PROCEDURE — 83550 IRON BINDING TEST: CPT

## 2019-10-04 PROCEDURE — 85379 FIBRIN DEGRADATION QUANT: CPT

## 2019-10-04 PROCEDURE — 86901 BLOOD TYPING SEROLOGIC RH(D): CPT

## 2019-10-04 PROCEDURE — 86900 BLOOD TYPING SEROLOGIC ABO: CPT

## 2019-10-04 PROCEDURE — 81001 URINALYSIS AUTO W/SCOPE: CPT

## 2019-10-04 RX ADMIN — CEFAZOLIN SCH MLS/HR: 330 INJECTION, POWDER, FOR SOLUTION INTRAMUSCULAR; INTRAVENOUS at 22:29

## 2019-10-04 RX ADMIN — ASPIRIN 325 MG ORAL TABLET SCH MG: 325 PILL ORAL at 22:30

## 2019-10-04 RX ADMIN — ATENOLOL SCH MG: 50 TABLET ORAL at 22:30

## 2019-10-04 NOTE — CT
EXAMINATION TYPE: CT brain wo con

 

DATE OF EXAM: 10/4/2019

 

COMPARISON: 6/21/2016

 

HISTORY: Syncope, Lung CA

 

CT DLP: 1080.4 mGycm

Automated exposure control for dose reduction was used.

 

FINDINGS: 

There is mild cerebral cortical atrophy. There is no mass effect nor midline shift. There is no sign 
of intracranial hemorrhage. Calvarium is intact. I see no bony destructive process. There is no evide
nce of cerebral edema.

 

IMPRESSION: 

MILD ATROPHY. NO ACUTE INTRACRANIAL ABNORMALITY. NO CHANGE.

## 2019-10-04 NOTE — CT
EXAMINATION TYPE: CT chest angio for PE

 

DATE OF EXAM: 10/4/2019

 

COMPARISON: 8/30/2019

 

HISTORY: syncopal episode, elevated d-dimer, lung ca

 

CT DLP: 492.5 mGycm

Automated exposure control for dose reduction was used.

 

CONTRAST: 

CT Chest for pulmonary embolism performed with with IV Contrast, patient injected with 78cc mL of Iso
glenna 370.

 

FINDINGS:

 

There are 3-D post processed images.

There is a 3.5 cm somewhat spiculated infiltrate at the anterior superior aspect of the right pulmona
ry hilum. There is some linear infiltrate and nodularity in the lateral right upper lobe. Nodule dario
ures 2 cm. There is no mediastinal adenopathy. Thoracic aorta is intact. There is no aneurysm or diss
ection. I see no filling defects in the pulmonary arteries. There is minimal linear density at the le
ft lung base. There is no pleural effusion. There is no pericardial effusion. Heart size is normal.

IMPRESSION:

No evidence of pulmonary embolism.

 

Right upper lobe mass unchanged. Second larger right upper lobe mass at the superior right pulmonary 
hilum is increased compared to last exam and suggests progression of tumor.

 

There is some new patchy atelectasis left posterior lung base compared to old exam.

## 2019-10-04 NOTE — ED
General Adult HPI





- General


Chief complaint: Syncope


Stated complaint: near syncope


Time Seen by Provider: 10/04/19 16:35


Source: patient, family, EMS


Mode of arrival: EMS


Limitations: no limitations





- History of Present Illness


Initial comments: 





Patient presents to the ED by ambulance for evaluation with his wife at bedside.

 Per wife, the patient was sent over from his pulmonologist's clinic today.  

Patient reports feeling intermittently dizzy for the past few days, and he 

states that he became dizzy again earlier today.  Per EMS, the patient had a 

syncopal episode while in the ambulance.  Per wife, the patient had a lung 

biopsy performed last Thursday, and he was diagnosed with lung cancer today.  

Patient also admits to having dark stools for the past few days.  Patient denies

having any pain, trauma or injury, fever or chills, headache, focal neuro 

deficit, chest pain, dyspnea, cough or cold symptoms, palpitations, abdominal 

pain, nausea or vomiting, diarrhea, dysuria or urinary symptoms, or any other 

symptoms or complaints.





- Related Data


                                Home Medications











 Medication  Instructions  Recorded  Confirmed


 


Benazepril HCl 40 mg PO DAILY 06/08/15 10/04/19


 


Aspirin 325 mg PO HS 01/12/17 10/04/19


 


Hydrochlorothiazide 25 mg PO DAILY 01/12/17 10/04/19


 


Atenolol [Tenormin] 100 mg PO BID 09/11/19 10/04/19


 


Albuterol Inhaler [Ventolin Hfa 1 - 2 puff INHALATION RT-Q4H PRN 10/04/19 

10/04/19





Inhaler]   


 


Fluticasone/Vilanterol [Breo 1 puff INHALATION RT-DAILY 10/04/19 10/04/19





Ellipta 200-25 Mcg INH]   











                                    Allergies











Allergy/AdvReac Type Severity Reaction Status Date / Time


 


No Known Allergies Allergy   Verified 10/04/19 16:57














Review of Systems


ROS Statement: 


Those systems with pertinent positive or pertinent negative responses have been 

documented in the HPI.





ROS Other: All systems not noted in ROS Statement are negative.





Past Medical History


Past Medical History: Cancer, Hypertension


Additional Past Medical History / Comment(s): HX SKIN CA, CURRENT LESION ON LEFT

CHEEK


History of Any Multi-Drug Resistant Organisms: None Reported


Past Surgical History: Back Surgery, Heart Catheterization, Joint Replacement, 

Orthopedic Surgery


Additional Past Surgical History / Comment(s): PREVIOUS SKIN CA LESIONS REMOVED,

HIP REPLACEMENT, ANKLE ORIF WITH METAL


Past Anesthesia/Blood Transfusion Reactions: No Reported Reaction


Past Psychological History: No Psychological Hx Reported


Smoking Status: Never smoker


Past Alcohol Use History: Daily


Past Drug Use History: None Reported





- Past Family History


  ** Mother


Family Medical History: Cancer, Deep Vein Thrombosis (DVT)


Additional Family Medical History / Comment(s): BREAST CA





General Exam


Limitations: no limitations


General appearance: alert, in no apparent distress


Head exam: Present: atraumatic, normocephalic


Eye exam: Present: normal appearance, PERRL, EOMI


ENT exam: Present: mucous membranes moist


Respiratory exam: Present: normal lung sounds bilaterally.  Absent: respiratory 

distress, wheezes, rales, rhonchi


Cardiovascular Exam: Present: normal rhythm, bradycardia, normal heart sounds, 

other (Weak, but palpable radial pulses bilaterally)


GI/Abdominal exam: Present: soft.  Absent: distended, tenderness, guarding


Rectal exam: Present: normal rectal tone, other (Brown stool present in the 

rectal vault; ED RN was present during rectal examination).  Absent: tenderness


Neurological exam: Present: alert, oriented X3.  Absent: motor sensory deficit


Psychiatric exam: Present: normal affect, normal mood


Skin exam: Present: warm, dry, intact, pallor





Course


                                   Vital Signs











  10/04/19 10/04/19 10/04/19





  16:29 17:19 18:41


 


Temperature 98.4 F  98.5 F


 


Pulse Rate 60 64 71


 


Respiratory 26 H 20 20





Rate   


 


Blood Pressure 70/47 92/57 105/64


 


O2 Sat by Pulse 100 96 99





Oximetry   














  10/04/19





  19:45


 


Temperature 98.3 F


 


Pulse Rate 69


 


Respiratory 15





Rate 


 


Blood Pressure 123/78


 


O2 Sat by Pulse 100





Oximetry 














- Reevaluation(s)


Reevaluation #1: 





10/04/19 19:40


Case, H&P, test results and ED management thus far were discussed with Dr. Earl who accepts hospital floor admission.  He asked to place a consultation 

order for Dr. Bundy (pulmonology).  He has no further recommendations at this

time.


10/04/19 20:03


Patient states that he is feeling much better now, and he denies development of 

any new symptoms while in the ED.  Patient's blood pressure has now improved.  

Patient and wife are aware of the patient's test results, and they both agree 

with hospital admission at this time.





EKG Findings





- EKG Comments:


EKG Findings:: Sinus bradycardia, ventricular rate of 54 bpm, normal KS and QRS 

intervals, normal QTc interval, normal axis, no ST or T-wave abnormality





Medical Decision Making





- Medical Decision Making





Patient was initially hypotensive on arrival to the ED, but he was hydrated with

a liter of normal saline, and his blood pressure has now improved.  Patient is 

now feeling better.  Patient's stool is occult blood negative.  Patient's CT 

angiogram chest is negative for PE.  Given the patient's syncopal episode and 

transient hypotension, will admit the patient to the hospital for further 

evaluation and monitoring.





- Lab Data


Result diagrams: 


                                 10/04/19 16:44





                                 10/04/19 16:44


                                   Lab Results











  10/04/19 10/04/19 10/04/19 Range/Units





  16:40 16:44 16:44 


 


WBC   9.9   (3.8-10.6)  k/uL


 


RBC   4.45   (4.30-5.90)  m/uL


 


Hgb   10.1 L   (13.0-17.5)  gm/dL


 


Hct   33.3 L   (39.0-53.0)  %


 


MCV   74.8 L   (80.0-100.0)  fL


 


MCH   22.6 L   (25.0-35.0)  pg


 


MCHC   30.2 L   (31.0-37.0)  g/dL


 


RDW   16.3 H   (11.5-15.5)  %


 


Plt Count   354   (150-450)  k/uL


 


Neutrophils %   75   %


 


Lymphocytes %   14   %


 


Monocytes %   6   %


 


Eosinophils %   1   %


 


Basophils %   1   %


 


Neutrophils #   7.5   (1.3-7.7)  k/uL


 


Lymphocytes #   1.4   (1.0-4.8)  k/uL


 


Monocytes #   0.6   (0-1.0)  k/uL


 


Eosinophils #   0.1   (0-0.7)  k/uL


 


Basophils #   0.1   (0-0.2)  k/uL


 


Hypochromasia   Marked   


 


Anisocytosis   Slight   


 


Microcytosis   Slight   


 


PT     (9.0-12.0)  sec


 


INR     (<1.2)  


 


APTT     (22.0-30.0)  sec


 


D-Dimer     (<0.60)  mg/L FEU


 


Sodium    134 L  (137-145)  mmol/L


 


Potassium    3.8  (3.5-5.1)  mmol/L


 


Chloride    97 L  ()  mmol/L


 


Carbon Dioxide    23  (22-30)  mmol/L


 


Anion Gap    14  mmol/L


 


BUN    14  (9-20)  mg/dL


 


Creatinine    1.39 H  (0.66-1.25)  mg/dL


 


Est GFR (CKD-EPI)AfAm    60  (>60 ml/min/1.73 sqM)  


 


Est GFR (CKD-EPI)NonAf    52  (>60 ml/min/1.73 sqM)  


 


Glucose    110 H  (74-99)  mg/dL


 


Lactic Ac Sepsis Rflx     


 


Plasma Lactic Acid Javi     (0.7-2.0)  mmol/L


 


Calcium    9.1  (8.4-10.2)  mg/dL


 


Magnesium    1.9  (1.6-2.3)  mg/dL


 


Total Bilirubin    0.7  (0.2-1.3)  mg/dL


 


AST    40  (17-59)  U/L


 


ALT    39  (21-72)  U/L


 


Alkaline Phosphatase    62  ()  U/L


 


Troponin I     (0.000-0.034)  ng/mL


 


Total Protein    7.2  (6.3-8.2)  g/dL


 


Albumin    3.8  (3.5-5.0)  g/dL


 


Urine Color     


 


Urine Appearance     (Clear)  


 


Urine pH     (5.0-8.0)  


 


Ur Specific Gravity     (1.001-1.035)  


 


Urine Protein     (Negative)  


 


Urine Glucose (UA)     (Negative)  


 


Urine Ketones     (Negative)  


 


Urine Blood     (Negative)  


 


Urine Nitrite     (Negative)  


 


Urine Bilirubin     (Negative)  


 


Urine Urobilinogen     (<2.0)  mg/dL


 


Ur Leukocyte Esterase     (Negative)  


 


Urine RBC     (0-5)  /hpf


 


Urine WBC     (0-5)  /hpf


 


Ur Squamous Epith Cells     (0-4)  /hpf


 


Hyaline Casts     (0-2)  /lpf


 


Urine Mucus     (None)  /hpf


 


Stool Occult Blood     (Negative)  


 


Blood Type  O Positive    


 


Blood Type Confirm     


 


Blood Type Recheck  No Previous Record    


 


Bld Type Recheck Status  CABO Indicated    


 


Antibody Screen  NEGATIVE    


 


Spec Expiration Date  10/07/2019 - 2341    














  10/04/19 10/04/19 10/04/19 Range/Units





  16:44 16:44 16:44 


 


WBC     (3.8-10.6)  k/uL


 


RBC     (4.30-5.90)  m/uL


 


Hgb     (13.0-17.5)  gm/dL


 


Hct     (39.0-53.0)  %


 


MCV     (80.0-100.0)  fL


 


MCH     (25.0-35.0)  pg


 


MCHC     (31.0-37.0)  g/dL


 


RDW     (11.5-15.5)  %


 


Plt Count     (150-450)  k/uL


 


Neutrophils %     %


 


Lymphocytes %     %


 


Monocytes %     %


 


Eosinophils %     %


 


Basophils %     %


 


Neutrophils #     (1.3-7.7)  k/uL


 


Lymphocytes #     (1.0-4.8)  k/uL


 


Monocytes #     (0-1.0)  k/uL


 


Eosinophils #     (0-0.7)  k/uL


 


Basophils #     (0-0.2)  k/uL


 


Hypochromasia     


 


Anisocytosis     


 


Microcytosis     


 


PT  10.3    (9.0-12.0)  sec


 


INR  1.0    (<1.2)  


 


APTT  22.8    (22.0-30.0)  sec


 


D-Dimer     (<0.60)  mg/L FEU


 


Sodium     (137-145)  mmol/L


 


Potassium     (3.5-5.1)  mmol/L


 


Chloride     ()  mmol/L


 


Carbon Dioxide     (22-30)  mmol/L


 


Anion Gap     mmol/L


 


BUN     (9-20)  mg/dL


 


Creatinine     (0.66-1.25)  mg/dL


 


Est GFR (CKD-EPI)AfAm     (>60 ml/min/1.73 sqM)  


 


Est GFR (CKD-EPI)NonAf     (>60 ml/min/1.73 sqM)  


 


Glucose     (74-99)  mg/dL


 


Lactic Ac Sepsis Rflx     


 


Plasma Lactic Acid Javi    2.2 H*  (0.7-2.0)  mmol/L


 


Calcium     (8.4-10.2)  mg/dL


 


Magnesium     (1.6-2.3)  mg/dL


 


Total Bilirubin     (0.2-1.3)  mg/dL


 


AST     (17-59)  U/L


 


ALT     (21-72)  U/L


 


Alkaline Phosphatase     ()  U/L


 


Troponin I   <0.012   (0.000-0.034)  ng/mL


 


Total Protein     (6.3-8.2)  g/dL


 


Albumin     (3.5-5.0)  g/dL


 


Urine Color     


 


Urine Appearance     (Clear)  


 


Urine pH     (5.0-8.0)  


 


Ur Specific Gravity     (1.001-1.035)  


 


Urine Protein     (Negative)  


 


Urine Glucose (UA)     (Negative)  


 


Urine Ketones     (Negative)  


 


Urine Blood     (Negative)  


 


Urine Nitrite     (Negative)  


 


Urine Bilirubin     (Negative)  


 


Urine Urobilinogen     (<2.0)  mg/dL


 


Ur Leukocyte Esterase     (Negative)  


 


Urine RBC     (0-5)  /hpf


 


Urine WBC     (0-5)  /hpf


 


Ur Squamous Epith Cells     (0-4)  /hpf


 


Hyaline Casts     (0-2)  /lpf


 


Urine Mucus     (None)  /hpf


 


Stool Occult Blood     (Negative)  


 


Blood Type     


 


Blood Type Confirm     


 


Blood Type Recheck     


 


Bld Type Recheck Status     


 


Antibody Screen     


 


Spec Expiration Date     














  10/04/19 10/04/19 10/04/19 Range/Units





  16:44 16:44 17:18 


 


WBC     (3.8-10.6)  k/uL


 


RBC     (4.30-5.90)  m/uL


 


Hgb     (13.0-17.5)  gm/dL


 


Hct     (39.0-53.0)  %


 


MCV     (80.0-100.0)  fL


 


MCH     (25.0-35.0)  pg


 


MCHC     (31.0-37.0)  g/dL


 


RDW     (11.5-15.5)  %


 


Plt Count     (150-450)  k/uL


 


Neutrophils %     %


 


Lymphocytes %     %


 


Monocytes %     %


 


Eosinophils %     %


 


Basophils %     %


 


Neutrophils #     (1.3-7.7)  k/uL


 


Lymphocytes #     (1.0-4.8)  k/uL


 


Monocytes #     (0-1.0)  k/uL


 


Eosinophils #     (0-0.7)  k/uL


 


Basophils #     (0-0.2)  k/uL


 


Hypochromasia     


 


Anisocytosis     


 


Microcytosis     


 


PT     (9.0-12.0)  sec


 


INR     (<1.2)  


 


APTT     (22.0-30.0)  sec


 


D-Dimer   0.82 H   (<0.60)  mg/L FEU


 


Sodium     (137-145)  mmol/L


 


Potassium     (3.5-5.1)  mmol/L


 


Chloride     ()  mmol/L


 


Carbon Dioxide     (22-30)  mmol/L


 


Anion Gap     mmol/L


 


BUN     (9-20)  mg/dL


 


Creatinine     (0.66-1.25)  mg/dL


 


Est GFR (CKD-EPI)AfAm     (>60 ml/min/1.73 sqM)  


 


Est GFR (CKD-EPI)NonAf     (>60 ml/min/1.73 sqM)  


 


Glucose     (74-99)  mg/dL


 


Lactic Ac Sepsis Rflx    Y  


 


Plasma Lactic Acid Javi     (0.7-2.0)  mmol/L


 


Calcium     (8.4-10.2)  mg/dL


 


Magnesium     (1.6-2.3)  mg/dL


 


Total Bilirubin     (0.2-1.3)  mg/dL


 


AST     (17-59)  U/L


 


ALT     (21-72)  U/L


 


Alkaline Phosphatase     ()  U/L


 


Troponin I     (0.000-0.034)  ng/mL


 


Total Protein     (6.3-8.2)  g/dL


 


Albumin     (3.5-5.0)  g/dL


 


Urine Color     


 


Urine Appearance     (Clear)  


 


Urine pH     (5.0-8.0)  


 


Ur Specific Gravity     (1.001-1.035)  


 


Urine Protein     (Negative)  


 


Urine Glucose (UA)     (Negative)  


 


Urine Ketones     (Negative)  


 


Urine Blood     (Negative)  


 


Urine Nitrite     (Negative)  


 


Urine Bilirubin     (Negative)  


 


Urine Urobilinogen     (<2.0)  mg/dL


 


Ur Leukocyte Esterase     (Negative)  


 


Urine RBC     (0-5)  /hpf


 


Urine WBC     (0-5)  /hpf


 


Ur Squamous Epith Cells     (0-4)  /hpf


 


Hyaline Casts     (0-2)  /lpf


 


Urine Mucus     (None)  /hpf


 


Stool Occult Blood  Negative    (Negative)  


 


Blood Type     


 


Blood Type Confirm     


 


Blood Type Recheck     


 


Bld Type Recheck Status     


 


Antibody Screen     


 


Spec Expiration Date     














  10/04/19 10/04/19 Range/Units





  17:45 19:30 


 


WBC    (3.8-10.6)  k/uL


 


RBC    (4.30-5.90)  m/uL


 


Hgb    (13.0-17.5)  gm/dL


 


Hct    (39.0-53.0)  %


 


MCV    (80.0-100.0)  fL


 


MCH    (25.0-35.0)  pg


 


MCHC    (31.0-37.0)  g/dL


 


RDW    (11.5-15.5)  %


 


Plt Count    (150-450)  k/uL


 


Neutrophils %    %


 


Lymphocytes %    %


 


Monocytes %    %


 


Eosinophils %    %


 


Basophils %    %


 


Neutrophils #    (1.3-7.7)  k/uL


 


Lymphocytes #    (1.0-4.8)  k/uL


 


Monocytes #    (0-1.0)  k/uL


 


Eosinophils #    (0-0.7)  k/uL


 


Basophils #    (0-0.2)  k/uL


 


Hypochromasia    


 


Anisocytosis    


 


Microcytosis    


 


PT    (9.0-12.0)  sec


 


INR    (<1.2)  


 


APTT    (22.0-30.0)  sec


 


D-Dimer    (<0.60)  mg/L FEU


 


Sodium    (137-145)  mmol/L


 


Potassium    (3.5-5.1)  mmol/L


 


Chloride    ()  mmol/L


 


Carbon Dioxide    (22-30)  mmol/L


 


Anion Gap    mmol/L


 


BUN    (9-20)  mg/dL


 


Creatinine    (0.66-1.25)  mg/dL


 


Est GFR (CKD-EPI)AfAm    (>60 ml/min/1.73 sqM)  


 


Est GFR (CKD-EPI)NonAf    (>60 ml/min/1.73 sqM)  


 


Glucose    (74-99)  mg/dL


 


Lactic Ac Sepsis Rflx    


 


Plasma Lactic Acid Javi    (0.7-2.0)  mmol/L


 


Calcium    (8.4-10.2)  mg/dL


 


Magnesium    (1.6-2.3)  mg/dL


 


Total Bilirubin    (0.2-1.3)  mg/dL


 


AST    (17-59)  U/L


 


ALT    (21-72)  U/L


 


Alkaline Phosphatase    ()  U/L


 


Troponin I    (0.000-0.034)  ng/mL


 


Total Protein    (6.3-8.2)  g/dL


 


Albumin    (3.5-5.0)  g/dL


 


Urine Color   Yellow  


 


Urine Appearance   Clear  (Clear)  


 


Urine pH   6.5  (5.0-8.0)  


 


Ur Specific Gravity   1.009  (1.001-1.035)  


 


Urine Protein   Trace H  (Negative)  


 


Urine Glucose (UA)   Negative  (Negative)  


 


Urine Ketones   Negative  (Negative)  


 


Urine Blood   Moderate H  (Negative)  


 


Urine Nitrite   Negative  (Negative)  


 


Urine Bilirubin   Negative  (Negative)  


 


Urine Urobilinogen   <2.0  (<2.0)  mg/dL


 


Ur Leukocyte Esterase   Negative  (Negative)  


 


Urine RBC   97 H  (0-5)  /hpf


 


Urine WBC   16 H  (0-5)  /hpf


 


Ur Squamous Epith Cells   1  (0-4)  /hpf


 


Hyaline Casts   4 H  (0-2)  /lpf


 


Urine Mucus   Rare H  (None)  /hpf


 


Stool Occult Blood    (Negative)  


 


Blood Type    


 


Blood Type Confirm  O Positive   


 


Blood Type Recheck    


 


Bld Type Recheck Status    


 


Antibody Screen    


 


Spec Expiration Date    














- Radiology Data


Radiology results: report reviewed (CT head is negative for acute intracranial 

abnormality; CT angiogram chest is negative for pulmonary embolism, and shows 

right upper lobe masses and patchy left posterior atelectasis), image reviewed 

(Chest x-ray shows poor inspiration and right upper lobe atelectasis)





Disposition


Clinical Impression: 


 Syncope, Anemia





Disposition: ADMITTED AS IP TO THIS [facility]


Condition: Stable


Is patient prescribed a controlled substance at d/c from ED?: No


Time of Disposition: 19:40


Decision Date: 10/04/19


Decision Time: 19:33

## 2019-10-04 NOTE — XR
EXAMINATION TYPE: XR chest 2V

 

DATE OF EXAM: 10/4/2019

 

COMPARISON: 9/26/2019

 

HISTORY: Syncope

 

TECHNIQUE:  Frontal and lateral views of the chest are obtained.

 

FINDINGS:  There is poor inspiration. There is elevated diaphragms. There is mild atelectasis right u
pper lobe. There are chest leads. There is no heart failure.

 

IMPRESSION:  Atelectasis right upper lobe unchanged. Poor inspiration. Inspiration however improved c
ompared to last exam.

## 2019-10-05 LAB
ALBUMIN SERPL-MCNC: 3.4 G/DL (ref 3.5–5)
ALP SERPL-CCNC: 49 U/L (ref 38–126)
ALT SERPL-CCNC: 39 U/L (ref 21–72)
ANION GAP SERPL CALC-SCNC: 7 MMOL/L
AST SERPL-CCNC: 28 U/L (ref 17–59)
BUN SERPL-SCNC: 12 MG/DL (ref 9–20)
CALCIUM SPEC-MCNC: 8.8 MG/DL (ref 8.4–10.2)
CELLS COUNTED: 100
CHLORIDE SERPL-SCNC: 98 MMOL/L (ref 98–107)
CO2 SERPL-SCNC: 31 MMOL/L (ref 22–30)
ERYTHROCYTE [DISTWIDTH] IN BLOOD BY AUTOMATED COUNT: 4.18 M/UL (ref 4.3–5.9)
ERYTHROCYTE [DISTWIDTH] IN BLOOD: 16.2 % (ref 11.5–15.5)
GLUCOSE SERPL-MCNC: 88 MG/DL (ref 74–99)
HCT VFR BLD AUTO: 32.1 % (ref 39–53)
HGB BLD-MCNC: 9.2 GM/DL (ref 13–17.5)
LYMPHOCYTES # BLD MANUAL: 0.68 K/UL (ref 1–4.8)
MCH RBC QN AUTO: 22 PG (ref 25–35)
MCHC RBC AUTO-ENTMCNC: 28.7 G/DL (ref 31–37)
MCV RBC AUTO: 76.8 FL (ref 80–100)
MONOCYTES # BLD MANUAL: 0.42 K/UL (ref 0–1)
NEUTROPHILS NFR BLD MANUAL: 79 %
PLATELET # BLD AUTO: 275 K/UL (ref 150–450)
POTASSIUM SERPL-SCNC: 3.6 MMOL/L (ref 3.5–5.1)
PROT SERPL-MCNC: 6.5 G/DL (ref 6.3–8.2)
SODIUM SERPL-SCNC: 136 MMOL/L (ref 137–145)
WBC # BLD AUTO: 5.2 K/UL (ref 3.8–10.6)

## 2019-10-05 RX ADMIN — BUDESONIDE AND FORMOTEROL FUMARATE DIHYDRATE SCH: 160; 4.5 AEROSOL RESPIRATORY (INHALATION) at 19:51

## 2019-10-05 RX ADMIN — BUDESONIDE AND FORMOTEROL FUMARATE DIHYDRATE SCH: 160; 4.5 AEROSOL RESPIRATORY (INHALATION) at 08:31

## 2019-10-05 RX ADMIN — HEPARIN SODIUM SCH UNIT: 5000 INJECTION, SOLUTION INTRAVENOUS; SUBCUTANEOUS at 21:52

## 2019-10-05 RX ADMIN — CEFAZOLIN SCH MLS/HR: 330 INJECTION, POWDER, FOR SOLUTION INTRAMUSCULAR; INTRAVENOUS at 21:53

## 2019-10-05 RX ADMIN — ATENOLOL SCH MG: 50 TABLET ORAL at 08:39

## 2019-10-05 RX ADMIN — CEFAZOLIN SCH MLS/HR: 330 INJECTION, POWDER, FOR SOLUTION INTRAMUSCULAR; INTRAVENOUS at 16:52

## 2019-10-05 RX ADMIN — ASPIRIN 325 MG ORAL TABLET SCH MG: 325 PILL ORAL at 21:52

## 2019-10-05 RX ADMIN — CEFAZOLIN SCH MLS/HR: 330 INJECTION, POWDER, FOR SOLUTION INTRAMUSCULAR; INTRAVENOUS at 08:39

## 2019-10-05 RX ADMIN — ATENOLOL SCH MG: 50 TABLET ORAL at 21:52
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
## 2019-10-05 NOTE — CT
EXAMINATION TYPE: CT abdomen pelvis w con

 

DATE OF EXAM: 10/5/2019

 

COMPARISON: None

 

HISTORY: lung cancer staging

 

CT DLP: 1250 mGycm

Automated exposure control for dose reduction was used.

 

TECHNIQUE:  Helical acquisition of images was performed from the lung bases through the pelvis.

 

CONTRAST: 

Performed without Oral Contrast and with IV Contrast, patient injected with 100 mL of Isovue 300.

 

FINDINGS: 

 

Multiple axial sections were obtained from the diaphragm to the floor the pelvis with intravenous con
trast. The contrast was Isovue 100 mL.

 

Lung bases show mild scarring and subsegmental atelectasis. There is no pleural effusion. There is no
 pericardial effusion.

 

Stomach is intact. Liver and gallbladder appear normal. Bile ducts are not dilated. Spleen appears no
rmal. There is no sign of pancreatic mass. There is no adrenal mass. Kidneys show satisfactory contra
st opacification. There is no hydronephrosis. There is no retroperitoneal adenopathy. The appendix ap
pears normal. There are few diverticula in the sigmoid colon. There is thickening of the posterior le
ft lateral wall of the urinary bladder.

 

There is no mesenteric edema. There is no ascites or free air. At T12 there is anterior wedging with 
70% loss of height. Unchanged.

 

There is no sign of a bowel obstruction. There is right side gluteal muscle atrophy.

 

IMPRESSION:

There is left side urinary bladder wall thickening that could relate to a tumor. Follow-up recommendlisandro maria

## 2019-10-05 NOTE — ECHOF
Referral Reason:LVF



MEASUREMENTS

--------

HEIGHT: 180.3 cm

WEIGHT: 90.7 kg

BP: 

RVIDd:   3.9 cm     (< 3.3)

IVSd:   1.0 cm     (0.6 - 1.1)

LVIDd:   4.6 cm     (3.9 - 5.3)

LVPWd:   1.2 cm     (0.6 - 1.1)

IVSs:   1.7 cm

LVIDs:   1.9 cm

LVPWs:   1.7 cm

Ao Diam:   3.4 cm     (2.0 - 3.7)

AV Cusp:   1.7 cm     (1.5 - 2.6)

LA Diam:   3.8 cm     (2.7 - 3.8)

MV EXCURSION:   12.495 mm     (> 18.000)

MV EF SLOPE:   27 mm/s     (70 - 150)

EPSS:   1.2 cm

MV E Cj:   0.84 m/s

MV DecT:   212 ms

MV A Cj:   0.78 m/s

MV E/A Ratio:   1.08 

RAP:   5.00 mmHg

RVSP:   10.25 mmHg







FINDINGS

--------

Sinus rhythm.

This was a technically difficult study with suboptimal views.

The left ventricular size is normal.   Left ventricular wall thickness is normal.   Overall left vent
ricular systolic function is normal with, an EF between 55 - 60 %.

The right ventricle is normal in size.

The left atrial size is normal.

The right atrial size is normal.

5.0mg of Lumason was utilized for enhancement of images

The aortic valve is trileaflet and appears structurally normal.

The mitral valve is normal.   Mild mitral regurgitation is present.

The tricuspid valve appears structurally normal.   Mild tricuspid regurgitation present.   Right vent
ricular systolic pressure is normal at < 35 mmHg.

There is no pulmonic regurgitation present.

The aortic root size is normal.

There is no pericardial effusion.



CONCLUSIONS

--------

1. Sinus rhythm.

2. This was a technically difficult study with suboptimal views.

3. The left ventricular size is normal.

4. Left ventricular wall thickness is normal.

5. The right ventricle is normal in size.

6. The left atrial size is normal.

7. The right atrial size is normal.

8. 5.0mg of Lumason was utilized for enhancement of images

9. The aortic valve is trileaflet and appears structurally normal.

10. The mitral valve is normal.

11. Mild mitral regurgitation is present.

12. The tricuspid valve appears structurally normal.

13. Mild tricuspid regurgitation present.

14. Right ventricular systolic pressure is normal at < 35 mmHg.

15. There is no pulmonic regurgitation present.

16. The aortic root size is normal.

17. There is no pericardial effusion.





SONOGRAPHER: Melany Desouza RDCS

## 2019-10-05 NOTE — P.CONS
History of Present Illness





- Reason for Consult


Consult date: 10/05/19


Head and neck cancer, lung cancer





- Chief Complaint


Head and neck CA, syncope





- History of Present Illness


53-year-old  gentleman with a past medical history of hypertension, 

recurrent basal cell carcinoma of skin, arthritis GERD and hypertension, with a 

recent diagnosis of head and neck squamous cell carcinoma and lung cancer has 

been admitted with syncope.


The patient still continues to have some symptoms, continues to be under 

evaluation. Has significant history of coronary artery disease.


As per the oncology history, the patient has recurrent basal cell carcinoma 

which has been removed in the past. Recently he has developed swelling in the 

neck on the left side which has been progressive, was evaluated as out patient 

had a biopsy done which was consistent with squamous cell carcinoma. The patient

had CT staging done which showed lung mass as well as per the patient the biopsy

was consistent with squamous cell carcinoma of the lung. The possibility is to 

separate malignancies versus metastatic disease.


As per the history the patient underwent fine-needle aspiration of the left neck

mass on September 17 which was consistent with squamous cell carcinoma. The 

patient was suggested really biopsy for definitive diagnosis. CT of the chest 

done on 9/26/2019 revealed 2 masses in the right upper lobe and enlarged left 

supraclavicular lymph nodes. The mass was 4.6 cm.


Patient underwent bronchoscopy and the pathology revealed squamous cell 

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


The patient had a repeat CT done which did not reveal any pulmonary embolism 

however has shown increase in the size of the mass especially in the pulmonary 

hilum.





Also new patchy atelectasis is seen.


Oncology has been consulted for further evaluation. Patient was scheduled for 

PET scan as outpatient which is pending.











Past medical history of osteoarthritis, recurrent basal cell carcinoma of skin, 

GERD hypertension.





Past surgical history significant for heart catheterization, orthopedic surgery,

back surgery removal of skin cancer.








Family history significant for cancer in the family, deep vein thrombosis, 

history of breast cancer in family. And mother.





Review of systems negative except as mentioned above.











Review of Systems





Apart from dizziness, fatigue address of the review of systems is negative.





Past Medical History


Past Medical History: Cancer, Hypertension


Additional Past Medical History / Comment(s): HX SKIN CA, CURRENT LESION ON LEFT

CHEEK, lung CA, CA in the lymph nodes


History of Any Multi-Drug Resistant Organisms: None Reported


Past Surgical History: Back Surgery, Heart Catheterization, Joint Replacement, 

Orthopedic Surgery


Additional Past Surgical History / Comment(s): PREVIOUS SKIN CA LESIONS REMOVED,

R HIP REPLACEMENT, ANKLE ORIF WITH METAL L


Past Anesthesia/Blood Transfusion Reactions: Previous Problems w/ Anesthesia


Additional Past Anesthesia/Blood Transfusion Reaction / Comm: Pt woke up 

violently, which is out of characteristic for the pt


Smoking Status: Never smoker


Additional Past Alcohol Use History / Comment(s): Patient is a lifelong 

nonsmoker but had extensive exposure to asbestos and acid at [employer] as 

well as plastic exposure and [employer].





- Past Family History


  ** Mother


Family Medical History: Cancer, Deep Vein Thrombosis (DVT)


Additional Family Medical History / Comment(s): BREAST CA





Medications and Allergies


                                Home Medications











 Medication  Instructions  Recorded  Confirmed  Type


 


Benazepril HCl 40 mg PO DAILY 06/08/15 10/04/19 History


 


Aspirin 325 mg PO HS 01/12/17 10/04/19 History


 


Hydrochlorothiazide 25 mg PO DAILY 01/12/17 10/04/19 History


 


Atenolol [Tenormin] 50 mg PO BID 09/11/19 10/04/19 History


 


Albuterol Inhaler [Ventolin Hfa 1 - 2 puff INHALATION RT-Q4H PRN 10/04/19 

10/04/19 History





Inhaler]    


 


Fluticasone/Vilanterol [Breo 1 puff INHALATION RT-DAILY 10/04/19 10/04/19 

History





Ellipta 200-25 Mcg INH]    








                                    Allergies











Allergy/AdvReac Type Severity Reaction Status Date / Time


 


No Known Allergies Allergy   Verified 10/04/19 21:51














Physical Exam


Vitals: 


                                   Vital Signs











  Temp Pulse Pulse Pulse Pulse Pulse Resp


 


 10/05/19 16:40  98.2 F   65     18


 


 10/05/19 12:00  98.5 F   67  68  80  71  18


 


 10/05/19 09:50     68  80  71 


 


 10/05/19 08:29       


 


 10/05/19 08:00  98.3 F   72     18


 


 10/05/19 04:00  98.1 F   60     18


 


 10/05/19 03:29        18


 


 10/05/19 00:00        18


 


 10/04/19 22:40        18


 


 10/04/19 22:00  98.5 F   76     18


 


 10/04/19 21:24  98.4 F  75      14














  BP BP BP BP BP Pulse Ox


 


 10/05/19 16:40   156/80     98


 


 10/05/19 12:00   132/75     99


 


 10/05/19 09:50    141/79  150/82  148/84 


 


 10/05/19 08:29       98


 


 10/05/19 08:00   129/72     98


 


 10/05/19 04:00   157/81     99


 


 10/05/19 03:29      


 


 10/05/19 00:00      


 


 10/04/19 22:40      


 


 10/04/19 22:00   155/83     100


 


 10/04/19 21:24  113/78      100








                                Intake and Output











 10/05/19 10/05/19 10/05/19





 06:59 14:59 22:59


 


Intake Total   240


 


Balance   240


 


Intake:   


 


  Oral   240


 


Other:   


 


  Voiding Method  Toilet Toilet


 


  # Voids 1 3 


 


  # Bowel Movements   1








The patient appeared well nourished and normally developed. Vital signs as 

documented. Head exam is unremarkable. No scleral icterus or corneal arcus 

noted. Neck is without jugular venous distension, thyromegaly, or carotid 

bruits. Carotid upstrokes are brisk bilaterally. Lungs are clear to auscultation

and percussion. Cardiac exam reveals the PMI to be normally sized and situated. 

Rhythm is regular. First and second heart sounds normal. No murmurs, rubs or 

gallops. Abdominal exam reveals normal bowel sounds, no masses, no organomegaly 

and no aortic enlargement. Extremities are nonedematous and both femoral and 

pedal pulses are normal.





Results


CBC & Chem 7: 


                                 10/05/19 05:35





                                 10/05/19 05:35


Labs: 


                  Abnormal Lab Results - Last 24 Hours (Table)











  10/05/19 10/05/19 Range/Units





  05:35 05:35 


 


RBC  4.18 L   (4.30-5.90)  m/uL


 


Hgb  9.2 L   (13.0-17.5)  gm/dL


 


Hct  32.1 L   (39.0-53.0)  %


 


MCV  76.8 L   (80.0-100.0)  fL


 


MCH  22.0 L   (25.0-35.0)  pg


 


MCHC  28.7 L   (31.0-37.0)  g/dL


 


RDW  16.2 H   (11.5-15.5)  %


 


Lymphocytes # (Manual)  0.68 L   (1.0-4.8)  k/uL


 


Sodium   136 L  (137-145)  mmol/L


 


Carbon Dioxide   31 H  (22-30)  mmol/L


 


Albumin   3.4 L  (3.5-5.0)  g/dL








                      Microbiology - Last 24 Hours (Table)











 10/04/19 19:30 Urine Culture - Preliminary





 Urine,Voided 














Assessment and Plan


Plan: 








Impression and plan:


1. Head and neck malignancy with left neck mass evident on examination with 

superficial lesions: CT documented right upper lobe masses of the lung:


- Had a neck mass and lung masses could possibly be different malignancies 

however can be metastatic as well.


- With both pathology is being squamous cell in origin difficult to 

differentiate.. 


- Nevertheless, patient will need further staging workup with bone scan, MRI 

brain. 


- Radiation oncology consult.


- In my opinion, the patient should start with concurrent chemotherapy and 

radiation for head and neck malignancy with weekly carboplatinum and Taxol which

does have some data head and neck. Another option could be radiation with single

agent carboplatinum and 5-FU. Single agent cetuximab.


- Consideration of triple endoscopy for further evaluation of head and neck 

cancer.


- Will further discuss with radiation oncology. Follow-up with Dr. Posada as 

outpatient





2. Syncope:


- MRI brain.





3.  Basal cell carcinoma of skin:


- Status post multiple resections.





4. Anemia 


- Anemia workup including iron profile, B12 folate ordered.





5. Hypertension. 





Thank you very much for the consult. We will continue to follow. Labs have been 

ordered.





INGRID Marmolejo

## 2019-10-05 NOTE — P.CNPUL
History of Present Illness


Consult date: 10/05/19


Requesting physician: Joel Earl


Reason for consult: other (Near syncope, new diagnosis of squamous cell lung 

cancer)


Chief complaint: Syncope, weakness, diaphoresis


History of present illness: 





This a very pleasant 69 year old gentleman who follows with Dr. Mata as 

his primary care physician.  He has history of hypertension, basal cell skin 

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  He is a lifelong nonsmoker 

though he worked at [employer] was exposed to asbestos, graphite and iron. 

He also worked in a plastics factory.  He had been following with ENT regarding 

a left neck growth that was biopsied 2 and was nondiagnostic.  There were some 

rare atypical epithelial cells suspicious for squamous cell carcinoma.  CAT scan

of the neck revealed lung masses as well.  He was seen in our office originally 

on 09/24/2019.  He had then undergone a navigational bronchoscopy with biopsies 

and this was confirmed to squamous cell carcinoma suspect lung primary.  He was 

scheduled for a PET scan today.  He was in our office yesterday for test results

and the patient developed near syncopal episode was pale and diaphoretic and EMS

was called.  He did have a syncopal episode in the ambulance.  Computed 

tomography scan of the brain revealed mild atrophy.  No acute intracranial 

abnormalities.  CT angiogram ruled out pulmonary embolism.  There was noted 

right upper lobe mass which is unchanged compared to previous.  The second large

right upper lobe mass at the superior right pulmonary hilum is increased 

compared to previous.  Some patchy atelectasis in the left lung base.  EKG 

revealed sinus bradycardia with evidence of a previous inferior wall myocardial 

infarction.  Troponins were negative.  He was anemic at 10.1.  Urine culture is 

pending.  Echocardiogram pending.  He is seen today in consultation on the on 

observation unit.  He is awake and alert in no acute distress.  No further 

episodes of near syncope.  He currently denies any worsening shortness of b

reath, cough or congestion.  No chest pain or palpitations. White count 5.2.  

Hemoglobin 9.2.  Sodium 136.  Potassium 3.6.  Creatinine 1.08.





Review of Systems





REVIEW OF SYSTEMS:


CONSTITUTIONAL: Denies any recent significant weight loss or weight gain.


EYES: Temporary change in vision.


EARS, NOSE, MOUTH, THROAT: Denies headaches, denies sore throat.  Positive 

hoarseness.


CARDIOVASCULAR: Denies chest pain, palpitations positive for syncopal episodes.


RESPIRATORY: Denies shortness of breath, cough, congestion or hemoptysis.


GASTROINTESTINAL: Denies change in appetite, denies abdominal pain


GENITOURINARY: Denies hematuria, denies infections.


MUSKULOSKELETAL: Denies pain, denies swelling.


INTEGUMENTARY: Denies rash, denies eczema.


NEUROLOGICAL: Denies recent memory loss, no recent seizure activity. 


PSYCHIATRIC: Denies anxiety, denies depression.


HEMATOLOGIC/LYMPHATIC: Positive for anemia, denies enlarged lymph nodes.








Past Medical History


Past Medical History: Cancer, Hypertension


Additional Past Medical History / Comment(s): HX SKIN CA, CURRENT LESION ON LEFT

CHEEK, lung CA, CA in the lymph nodes


History of Any Multi-Drug Resistant Organisms: None Reported


Past Surgical History: Back Surgery, Heart Catheterization, Joint Replacement, 

Orthopedic Surgery


Additional Past Surgical History / Comment(s): PREVIOUS SKIN CA LESIONS REMOVED,

R HIP REPLACEMENT, ANKLE ORIF WITH METAL L


Past Anesthesia/Blood Transfusion Reactions: Previous Problems w/ Anesthesia


Additional Past Anesthesia/Blood Transfusion Reaction / Comment(s): Pt woke up 

violently, which is out of characteristic for the pt


Smoking Status: Never smoker


Additional Past Alcohol Use History / Comment(s): Patient is a lifelong 

nonsmoker but had extensive exposure to asbestos and acid at [employer] as 

well as plastic exposure and [employer].





- Past Family History


  ** Mother


Family Medical History: Cancer, Deep Vein Thrombosis (DVT)


Additional Family Medical History / Comment(s): BREAST CA





Medications and Allergies


                                Home Medications











 Medication  Instructions  Recorded  Confirmed  Type


 


Benazepril HCl 40 mg PO DAILY 06/08/15 10/04/19 History


 


Aspirin 325 mg PO HS 01/12/17 10/04/19 History


 


Hydrochlorothiazide 25 mg PO DAILY 01/12/17 10/04/19 History


 


Atenolol [Tenormin] 50 mg PO BID 09/11/19 10/04/19 History


 


Albuterol Inhaler [Ventolin Hfa 1 - 2 puff INHALATION RT-Q4H PRN 10/04/19 

10/04/19 History





Inhaler]    


 


Fluticasone/Vilanterol [Breo 1 puff INHALATION RT-DAILY 10/04/19 10/04/19 

History





Ellipta 200-25 Mcg INH]    








                                    Allergies











Allergy/AdvReac Type Severity Reaction Status Date / Time


 


No Known Allergies Allergy   Verified 10/04/19 21:51














Physical Exam


Vitals: 


                                   Vital Signs











  Temp Pulse Pulse Pulse Pulse Pulse Resp


 


 10/05/19 09:50     68  80  71 


 


 10/05/19 08:29       


 


 10/05/19 08:00  98.3 F   72     18


 


 10/05/19 04:00  98.1 F   60     18


 


 10/05/19 03:29        18


 


 10/05/19 00:00        18


 


 10/04/19 22:40        18


 


 10/04/19 22:00  98.5 F   76     18


 


 10/04/19 21:24  98.4 F  75      14


 


 10/04/19 19:45  98.3 F  69      15


 


 10/04/19 18:41  98.5 F  71      20


 


 10/04/19 17:19   64      20


 


 10/04/19 16:29  98.4 F  60      26 H














  BP BP BP BP BP Pulse Ox


 


 10/05/19 09:50    141/79  150/82  148/84 


 


 10/05/19 08:29       98


 


 10/05/19 08:00   129/72     98


 


 10/05/19 04:00   157/81     99


 


 10/05/19 03:29      


 


 10/05/19 00:00      


 


 10/04/19 22:40      


 


 10/04/19 22:00   155/83     100


 


 10/04/19 21:24  113/78      100


 


 10/04/19 19:45  123/78      100


 


 10/04/19 18:41  105/64      99


 


 10/04/19 17:19  92/57      96


 


 10/04/19 16:29  70/47      100








                                Intake and Output











 10/04/19 10/05/19 10/05/19





 22:59 06:59 14:59


 


Output Total 200  


 


Balance -200  


 


Output:   


 


  Urine 200  


 


Other:   


 


  Voiding Method   Toilet


 


  # Voids 1 1 


 


  Weight 90.718 kg  














GENERAL EXAM: Alert, active, comfortable in no apparent distress.  On room air.


HEAD: Normocephalic.


EYES: Normal reaction of pupils, equal size.


NOSE: Clear with pink turbinates.


THROAT: No erythema or exudates.


NECK: Left-sided neck masses, no JVD.


CHEST: No chest wall deformity.


LUNGS: Equal air entry with no crackles, wheeze, rhonchi or dullness.


CVS: S1 and S2 normal with no audible murmur, regular rhythm.


ABDOMEN: No hepatosplenomegaly, normal bowel sounds, no guarding or rigidity.


SPINE: No scoliosis or deformity


SKIN: No rashes


CENTRAL NERVOUS SYSTEM: No focal deficits, tone is normal in all 4 extremities.


EXTREMITIES: There is no peripheral edema.  No clubbing, no cyanosis.  

Peripheral pulses are intact.





Results





- Laboratory Findings


CBC and BMP: 


                                 10/05/19 05:35





                                 10/05/19 05:35


PT/INR, D-dimer











PT  10.3 sec (9.0-12.0)   10/04/19  16:44    


 


INR  1.0  (<1.2)   10/04/19  16:44    


 


D-Dimer  0.82 mg/L FEU (<0.60)  H  10/04/19  16:44    








Abnormal lab findings: 


                                  Abnormal Labs











  10/04/19 10/04/19 10/04/19





  16:44 16:44 16:44


 


RBC   


 


Hgb  10.1 L  


 


Hct  33.3 L  


 


MCV  74.8 L  


 


MCH  22.6 L  


 


MCHC  30.2 L  


 


RDW  16.3 H  


 


Lymphocytes # (Manual)   


 


D-Dimer   


 


Sodium   134 L 


 


Chloride   97 L 


 


Carbon Dioxide   


 


Creatinine   1.39 H 


 


Glucose   110 H 


 


Plasma Lactic Acid Javi    2.2 H*


 


Albumin   


 


Urine Protein   


 


Urine Blood   


 


Urine RBC   


 


Urine WBC   


 


Hyaline Casts   


 


Urine Mucus   














  10/04/19 10/04/19 10/05/19





  16:44 19:30 05:35


 


RBC    4.18 L


 


Hgb    9.2 L


 


Hct    32.1 L


 


MCV    76.8 L


 


MCH    22.0 L


 


MCHC    28.7 L


 


RDW    16.2 H


 


Lymphocytes # (Manual)    0.68 L


 


D-Dimer  0.82 H  


 


Sodium   


 


Chloride   


 


Carbon Dioxide   


 


Creatinine   


 


Glucose   


 


Plasma Lactic Acid Javi   


 


Albumin   


 


Urine Protein   Trace H 


 


Urine Blood   Moderate H 


 


Urine RBC   97 H 


 


Urine WBC   16 H 


 


Hyaline Casts   4 H 


 


Urine Mucus   Rare H 














  10/05/19





  05:35


 


RBC 


 


Hgb 


 


Hct 


 


MCV 


 


MCH 


 


MCHC 


 


RDW 


 


Lymphocytes # (Manual) 


 


D-Dimer 


 


Sodium  136 L


 


Chloride 


 


Carbon Dioxide  31 H


 


Creatinine 


 


Glucose 


 


Plasma Lactic Acid Javi 


 


Albumin  3.4 L


 


Urine Protein 


 


Urine Blood 


 


Urine RBC 


 


Urine WBC 


 


Hyaline Casts 


 


Urine Mucus 














- Diagnostic Findings


Chest x-ray: image reviewed


CT scan - chest: image reviewed





Assessment and Plan


Assessment: 





Impression:





#1 Syncopal episode of unclear etiology, possible arrhythmia.  Echocardiogram 

pending.  Troponins negative.





#2 Recent diagnosis of squamous cell carcinoma via navigational bronchoscopy on 

09/26/2019.





#3 Left neck masses suspicious for squamous cell carcinoma with two previous FNA

biopsies.





#4 Lifelong nonsmoker.





#5 Hypertension.





#6 History of basal cell skin cancer the left temple with previous excision.





#7 Arthritis.





Plan:





The patient was seen and evaluated by Dr. Boudreaux.  CAT scan, labs and vital 

signs reviewed.  Suspect possible bradycardia or other arrhythmias.  

Echocardiogram pending.  Further workup as an inpatient.  May benefit from 30 

day event monitor post discharge.  He and his wife are aware of his lung biopsy 

results and oncology has been consulted.  He was scheduled for PET scan today, 

to be rescheduled next weekend.  We will continue to follow and make further 

recommendations based on his clinical status.





I, the cosigning physician, performed a history & physical examination of the 

patient. Lungs sounds are clear.  Maintaining good O2 saturations in the 90s on 

room air.  I discussed the assessment and plan of care with my nurse 

practitioner, Lory Garcia. I attest to the above note as dictated by her.


Time with Patient: Greater than 30

## 2019-10-06 RX ADMIN — FAMOTIDINE SCH MG: 20 TABLET, FILM COATED ORAL at 09:23

## 2019-10-06 RX ADMIN — ATENOLOL SCH MG: 50 TABLET ORAL at 21:05

## 2019-10-06 RX ADMIN — HEPARIN SODIUM SCH UNIT: 5000 INJECTION, SOLUTION INTRAVENOUS; SUBCUTANEOUS at 21:05

## 2019-10-06 RX ADMIN — HEPARIN SODIUM SCH UNIT: 5000 INJECTION, SOLUTION INTRAVENOUS; SUBCUTANEOUS at 09:23

## 2019-10-06 RX ADMIN — BUDESONIDE AND FORMOTEROL FUMARATE DIHYDRATE SCH: 160; 4.5 AEROSOL RESPIRATORY (INHALATION) at 19:30

## 2019-10-06 RX ADMIN — ATENOLOL SCH MG: 50 TABLET ORAL at 09:23

## 2019-10-06 RX ADMIN — ASPIRIN 325 MG ORAL TABLET SCH MG: 325 PILL ORAL at 21:05

## 2019-10-06 RX ADMIN — BUDESONIDE AND FORMOTEROL FUMARATE DIHYDRATE SCH: 160; 4.5 AEROSOL RESPIRATORY (INHALATION) at 09:06
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
## 2019-10-06 NOTE — P.PN
Subjective


Progress Note Date: 10/06/19


Principal diagnosis: 





syncopal episode most likely secondary to hypotension or cardiac arrhythmia





This a very pleasant 69 year old gentleman who follows with Dr. Mata as 

his primary care physician.  He has history of hypertension, basal cell skin 

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  He is a lifelong nonsmoker 

though he worked at [employer] was exposed to asbestos, graphite and iron. 

He also worked in a plastics factory.  He had been following with ENT regarding 

a left neck growth that was biopsied 2 and was nondiagnostic.  There were some 

rare atypical epithelial cells suspicious for squamous cell carcinoma.  CAT scan

of the neck revealed lung masses as well.  He was seen in our office originally 

on 09/24/2019.  He had then undergone a navigational bronchoscopy with biopsies 

and this was confirmed to squamous cell carcinoma suspect lung primary.  He was 

scheduled for a PET scan today.  He was in our office yesterday for test results

and the patient developed near syncopal episode was pale and diaphoretic and EMS

was called.  He did have a syncopal episode in the ambulance.  Computed 

tomography scan of the brain revealed mild atrophy.  No acute intracranial 

abnormalities.  CT angiogram ruled out pulmonary embolism.  There was noted 

right upper lobe mass which is unchanged compared to previous.  The second large

right upper lobe mass at the superior right pulmonary hilum is increased 

compared to previous.  Some patchy atelectasis in the left lung base.  EKG 

revealed sinus bradycardia with evidence of a previous inferior wall myocardial 

infarction.  Troponins were negative.  He was anemic at 10.1.  Urine culture is 

pending.  Echocardiogram pending.  He is seen today in consultation on the on 

observation unit.  He is awake and alert in no acute distress.  No further 

episodes of near syncope.  He currently denies any worsening shortness of 

breath, cough or congestion.  No chest pain or palpitations. White count 5.2.  

Hemoglobin 9.2.  Sodium 136.  Potassium 3.6.  Creatinine 1.08.





Patient was reevaluated today on 10/6/2019, seems to be doing fairly well, 

asymptomatic, being considered for discharge in the next 24 hours.  No further 

episodes of syncope or near syncope since admission.had a CT of the abdomen and 

pelvis showed thickening of the wall of the urinary bladder.  This should  be 

addressed on an outpatient basis.may eventually require cystoscopy.  Patient has

no urinary complaints.  Pulmonary-wise is doing well, scheduled to have 

outpatient PET scan, and this should be done by oncology once the patient is 

discharged home.  MRI is scheduled for tomorrow and the patient will likely be 

discharged home tomorrow.











Objective





- Vital Signs


Vital signs: 


                                   Vital Signs











Temp  98.2 F   10/06/19 08:00


 


Pulse  70   10/06/19 08:00


 


Resp  18   10/06/19 08:00


 


BP  159/77   10/06/19 08:00


 


Pulse Ox  96   10/06/19 08:00








                                 Intake & Output











 10/05/19 10/06/19 10/06/19





 18:59 06:59 18:59


 


Intake Total 240  


 


Output Total  500 


 


Balance 240 -500 


 


Weight  83.7 kg 


 


Intake:   


 


  Oral 240  


 


Output:   


 


  Urine  500 


 


Other:   


 


  Voiding Method Toilet Toilet Toilet


 


  # Voids 3 2 


 


  # Bowel Movements 1  














- Exam





Physical Exam: Revealed a 69-year-old white male pleasant in no distress.


Head: Atraumatic normocephalic.


HEENT:[Neck is supple.] [No neck masses.] [No thyromegaly.] [No JVD.]PERRLA, 

EOMI, no icterus.


Chest: [Clear throughout, no crackles, no rhonchi, no wheezes.]


Cardiac Exam: [Normal S1 and S2, no S3 gallop, no murmur.]


Abdomen: [Soft, nontender,  no megaly, no rebound, no guarding, normal bowel 

sounds.]


Extremities: [No clubbing, no edema, no cyanosis.]


Neurological Exam: [No focal neurologic deficit.]


musculoskeletal: Normal range of motion no deformities, muscle strength intact 

and symmetrical


Psychiatric: Normal mood affect and normal mental status examination.


Skin: No rashes





- Labs


CBC & Chem 7: 


                                 10/05/19 05:35





                                 10/05/19 05:35


Labs: 


                      Microbiology - Last 24 Hours (Table)











 10/04/19 19:30 Urine Culture - Preliminary





 Urine,Voided 


 


 10/04/19 18:00 Blood Culture - Preliminary





 Blood    No Growth after 24 hours














Assessment and Plan


Assessment: 


#1 Syncopal episode of unclear etiology, possible arrhythmia.  possibly 

secondary to hypotension.workup so far has been negative and nondiagnostic





#2 Recent diagnosis of squamous cell carcinoma via navigational bronchoscopy on 

09/26/2019.





#3 Left neck masses suspicious for squamous cell carcinoma with two previous FNA

biopsies.





#4 Lifelong nonsmoker.





#5 Hypertension.





#6 History of basal cell skin cancer the left temple with previous excision.





#7 Arthritis.





Recommendation: Continue present treatment plan, consider an event monitor on 

outpatient basis unless we find a good explanation of his syncopal or near 

syncopal episode.  Patient will follow-up with Dr. Bundy on outpatient basis 

agree with discharge planning tomorrow after MRI.








Time with Patient: Less than 30

## 2019-10-07 VITALS — TEMPERATURE: 98 F | RESPIRATION RATE: 18 BRPM | DIASTOLIC BLOOD PRESSURE: 72 MMHG | SYSTOLIC BLOOD PRESSURE: 133 MMHG

## 2019-10-07 VITALS — HEART RATE: 52 BPM

## 2019-10-07 LAB — FERRITIN SERPL-MCNC: 45.7 NG/ML (ref 22–322)

## 2019-10-07 RX ADMIN — ATENOLOL SCH MG: 50 TABLET ORAL at 09:09

## 2019-10-07 RX ADMIN — HEPARIN SODIUM SCH UNIT: 5000 INJECTION, SOLUTION INTRAVENOUS; SUBCUTANEOUS at 09:09

## 2019-10-07 RX ADMIN — BUDESONIDE AND FORMOTEROL FUMARATE DIHYDRATE SCH: 160; 4.5 AEROSOL RESPIRATORY (INHALATION) at 08:49

## 2019-10-07 RX ADMIN — FAMOTIDINE SCH MG: 20 TABLET, FILM COATED ORAL at 09:09

## 2019-10-07 NOTE — MR
EXAMINATION TYPE: MR brain wo/w con

 

DATE OF EXAM: 10/7/2019

 

COMPARISON: CT brain 10/4/2019

 

HISTORY: Dizziness, lung cancer head and neck cancer evaluation

 

TECHNIQUE: 

Multiplanar, multisequence images of the brain and brainstem is performed without and with IV contras
t, utilizing 7.5 mL intravenous Gadavist .

 

FINDINGS: Diffusion weighted images demonstrate no evidence of a recent infarct or other diffusion ab
normality.  There is no extra-axial fluid collection.  Periventricular, pericallosal, sub and juxtaco
rtical white matter hyperintensities are present on inversion recovery T2-weighted sequences. Approxi
mately 50 lesions are present. The largest measures approximately 7 mm in the left frontal white adriana
er on axial image #25. The ventricular system and cisternal spaces are normal in size and appearance.
  The brain volume is age appropriate.

 

Midline structures demonstrate normal morphology.  The craniocervical junction appears within normal 
limits.  Post contrast images demonstrate no abnormal enhancement. The dural venous sinuses appear pa
tent. The visualized sinuses are clear and the globes are intact. Left mastoid air cells show inflamm
atory change, hyperintensity and inversion recovery and T2-weighted sequences. The lobe left neck dilip
ws some borderline adenopathy.

 

IMPRESSION: Nonspecific white matter demyelination. Correlate for left mastoiditis. Possible neck kalia
nopathy, consider dedicated imaging.

## 2019-10-07 NOTE — P.PN
Subjective


Progress Note Date: 10/07/19


Principal diagnosis: 


Syncopal episode most likely secondary to hypotension or cardiac arrhythmia





This a very pleasant 69 year old gentleman who follows with Dr. Mata as 

his primary care physician.  He has history of hypertension, basal cell skin 

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  He is a lifelong nonsmoker 

though he worked at [employer] was exposed to asbestos, graphite and iron. 

He also worked in a plastics factory.  He had been following with ENT regarding 

a left neck growth that was biopsied 2 and was nondiagnostic.  There were some 

rare atypical epithelial cells suspicious for squamous cell carcinoma.  CAT scan

of the neck revealed lung masses as well.  He was seen in our office originally 

on 09/24/2019.  He had then undergone a navigational bronchoscopy with biopsies 

and this was confirmed to squamous cell carcinoma suspect lung primary.  He was 

scheduled for a PET scan today.  He was in our office yesterday for test results

and the patient developed near syncopal episode was pale and diaphoretic and EMS

was called.  He did have a syncopal episode in the ambulance.  Computed 

tomography scan of the brain revealed mild atrophy.  No acute intracranial 

abnormalities.  CT angiogram ruled out pulmonary embolism.  There was noted r

ight upper lobe mass which is unchanged compared to previous.  The second large 

right upper lobe mass at the superior right pulmonary hilum is increased 

compared to previous.  Some patchy atelectasis in the left lung base.  EKG 

revealed sinus bradycardia with evidence of a previous inferior wall myocardial 

infarction.  Troponins were negative.  He was anemic at 10.1.  Urine culture is 

pending.  Echocardiogram pending.  He is seen today in consultation on the on 

observation unit.  He is awake and alert in no acute distress.  No further 

episodes of near syncope.  He currently denies any worsening shortness of 

breath, cough or congestion.  No chest pain or palpitations. White count 5.2.  

Hemoglobin 9.2.  Sodium 136.  Potassium 3.6.  Creatinine 1.08.





Patient was reevaluated today on 10/6/2019, seems to be doing fairly well, 

asymptomatic, being considered for discharge in the next 24 hours.  No further 

episodes of syncope or near syncope since admission.had a CT of the abdomen and 

pelvis showed thickening of the wall of the urinary bladder.  This should  be 

addressed on an outpatient basis.may eventually require cystoscopy.  Patient has

no urinary complaints.  Pulmonary-wise is doing well, scheduled to have 

outpatient PET scan, and this should be done by oncology once the patient is 

discharged home.  MRI is scheduled for tomorrow and the patient will likely be 

discharged home tomorrow.





On 10/07/2019 patient seen in follow-up on selective care unit, he is awake and 

alert, no further episodes of syncope, neurologically intact, denies any acute 

complaints, no shortness of breath, no chest pain, no fever chills, patient had 

his MRI of the brain, awaiting 4 transcribed report.   Patient was also seen by 

medical oncology for the left neck mass, and right upper lobe pulmonary mass.  

Patient will undergo outpatient PET scan.  Otherwise no acute events overnight, 

patient will likely be discharged home today, and will be followed up on 

outpatient basis








Objective





- Vital Signs


Vital signs: 


                                   Vital Signs











Temp  98.0 F   10/07/19 08:00


 


Pulse  52 L  10/07/19 08:00


 


Resp  18   10/07/19 08:00


 


BP  133/72   10/07/19 08:00


 


Pulse Ox  98   10/07/19 08:00








                                 Intake & Output











 10/06/19 10/07/19 10/07/19





 18:59 06:59 18:59


 


Intake Total 240 320 260


 


Balance 240 320 260


 


Weight  82.7 kg 


 


Intake:   


 


  IV  60 20


 


    Invasive Line 1  30 10


 


    Invasive Line 2  30 10


 


  Oral 240 260 240


 


Other:   


 


  Voiding Method Toilet Toilet Toilet


 


  # Voids 1 3 














- Exam


 GENERAL EXAM: Alert, pleasant, 69-year-old white male on room air, with a pulse

ox of 98% comfortable in no apparent distress.


HEAD: Normocephalic/atraumatic.


EYES: Normal reaction of pupils, equal size.  Conjunctiva pink, sclera white.


NOSE: Clear with pink turbinates.


THROAT: No erythema or exudates.  Patient has a voice hoarseness


NECK: Left neck raised ulcerated mass, no JVD, no thyroid enlargement


CHEST: No chest wall deformity.  Symmetrical expansion. 


LUNGS: Equal air entry with no crackles, wheeze, rhonchi or dullness.


CVS: Regular rate and rhythm, normal S1 and S2, no gallops, no murmurs, no rubs


ABDOMEN: Soft, nontender.  No hepatosplenomegaly, normal bowel sounds, no 

guarding or rigidity.


EXTREMITIES: No clubbing, no edema, no cyanosis, 2+ pulses and upper and lower 

extremities.


MUSCULOSKELETAL: Muscle strength and tone normal.


SPINE: No scoliosis or deformity


SKIN: No rashes


CENTRAL NERVOUS SYSTEM: Alert and oriented -3.  No focal deficits, tone is 

normal in all 4 extremities.


PSYCHIATRIC: Alert and oriented -3.  Appropriate affect.  Intact judgment and 

insight.











- Labs


CBC & Chem 7: 


                                 10/05/19 05:35





                                 10/05/19 05:35


Labs: 


                  Abnormal Lab Results - Last 24 Hours (Table)











  10/05/19 Range/Units





  05:35 


 


Iron  17 L  ()  ug/dL


 


Iron Saturation  5.41 L  (15.00-50.00)   








                      Microbiology - Last 24 Hours (Table)











 10/04/19 18:00 Blood Culture - Preliminary





 Blood    No Growth after 48 hours














Assessment and Plan


Plan: 


 Assessment:





#1 Syncopal episode of unclear etiology, possible arrhythmia.  possibly 

secondary to hypotension.workup so far has been negative and nondiagnostic





#2 Recent diagnosis of squamous cell carcinoma via navigational bronchoscopy on 

09/26/2019.





#3 Left neck masses suspicious for squamous cell carcinoma with two previous FNA

biopsies.





#4 Lifelong nonsmoker.





#5 Hypertension.





#6 History of basal cell skin cancer the left temple with previous excision.





#7 Arthritis.





Plan:





No recurrent episodes of syncope or presyncope while inpatient, vital signs have

been stable, awaiting transcribed report of the brain MRI, anticipate discharge 

home today with outpatient follow-up with Dr. Bundy and patient will be set 

up for a PET scan. 





I performed a history & physical examination of the patient and discussed their 

management with my nurse practitioner, Maria Del Carmen Davila.  I reviewed the nurse pra

ctsuzanneer's note and agree with the documented findings and plan of care.  Lung 

sounds are positive for clear breath sounds.  The findings and the impression 

was discussed with the patient.  I attest to the documentation by the nurse 

practitioner. 


Time with Patient: Less than 30

## 2019-10-07 NOTE — P.PN
Subjective


Progress Note Date: 10/07/19


Principal diagnosis: 





squamous cell cancer (positive biopsies from lung and neck)





Saw pt with wife at bedside today.  Pt has no c/o, no episodes of syncopy, he is

tolerating oral intake, no pain.  He is anxious to go home and do work up 

outpatient.  





Objective





- Vital Signs


Vital signs: 


                                   Vital Signs











Temp  98.0 F   10/07/19 08:00


 


Pulse  52 L  10/07/19 08:00


 


Resp  18   10/07/19 08:00


 


BP  133/72   10/07/19 08:00


 


Pulse Ox  98   10/07/19 08:00








                                 Intake & Output











 10/06/19 10/07/19 10/07/19





 18:59 06:59 18:59


 


Intake Total 240 320 280


 


Balance 240 320 280


 


Weight  82.7 kg 


 


Intake:   


 


  IV  60 40


 


    Invasive Line 1  30 20


 


    Invasive Line 2  30 20


 


  Oral 240 260 240


 


Other:   


 


  Voiding Method Toilet Toilet Toilet


 


  # Voids 1 3 














- Constitutional


General appearance: Present: average body habitus, cooperative, no acute 

distress





- EENT


Eyes: Present: anicteric sclerae, EOMI


ENT: Present: hearing grossly normal, normal oropharynx





- Neck


Details: 





left neck 4cm anterior cervical hard fixed mass, near ulceration, no drainage, 

no s/s infection, some smaller LN in the left supraclavicular area, possibly 

anterior lt axilla area of fullness, soft 


Neck: Present: lymphadenopathy





- Respiratory


Respiratory: bilateral: CTA





- Cardiovascular


Rhythm: regular


Heart sounds: normal: S1, S2


Abnormal Heart Sounds: Absent: systolic murmur, diastolic murmur, rub, S3 

Gallop, S4 Gallop, click, other





- Peripheral edema


  ** leg


Peripheral Edema: bilateral: None





- Gastrointestinal


General gastrointestinal: Present: normal bowel sounds, soft.  Absent: absent 

bowel sounds, decreased bowel sounds, distended, hepatomegaly, hyperactive bowel

sounds, organomegaly, rigid, scaphoid, splenomegaly, tenderness, umbilical 

hernia, ventral hernia





- Genitourinary


Genitourinary Comment(s): 





no inguinal adenopathy





- Neurologic


Neurologic: Present: CNII-XII intact





- Musculoskeletal


Musculoskeletal: Present: strength equal bilaterally





- Psychiatric


Psychiatric: Present: A&O x's 3, appropriate affect, intact judgment & insight





- Labs


CBC & Chem 7: 


                                 10/05/19 05:35





                                 10/05/19 05:35


Labs: 


                  Abnormal Lab Results - Last 24 Hours (Table)











  10/05/19 Range/Units





  05:35 


 


Iron  17 L  ()  ug/dL


 


Iron Saturation  5.41 L  (15.00-50.00)   








                      Microbiology - Last 24 Hours (Table)











 10/04/19 18:00 Blood Culture - Preliminary





 Blood    No Growth after 48 hours














- Imaging and Cardiology


CT scan - abdomen: report reviewed


CT scan - chest: report reviewed


CT Scan - head: report reviewed


CT scan - pelvis: report reviewed


MRI - head: report reviewed





Assessment and Plan


Assessment: 





1)  Squamous cell carcinoma-left neck and RUL





2) Iron deficiency


Plan: 





1)  Reviewed with pt and wife work up that is completed and results and what 

other testing needs to be done so we have the info to determine what the 

prognosis and treatment plan will be.


It is ok to follow up outpatient as pt is feeling well and wants to go home.


Plan is to discuss case with Pathologist and determine if the 2 biopsies are the

same cancer or 2 primaries.  If necessary, pt is willing to come back for more 

biopsies.  


PET will be scheduled








Iron deficiency will be addressed outpatient
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
## 2019-10-17 ENCOUNTER — HOSPITAL ENCOUNTER (OUTPATIENT)
Dept: HOSPITAL 47 - RADPROMAIN | Age: 69
Discharge: HOME | End: 2019-10-17
Attending: INTERNAL MEDICINE
Payer: MEDICARE

## 2019-10-17 VITALS — SYSTOLIC BLOOD PRESSURE: 162 MMHG | DIASTOLIC BLOOD PRESSURE: 88 MMHG | RESPIRATION RATE: 16 BRPM | HEART RATE: 62 BPM

## 2019-10-17 VITALS — TEMPERATURE: 97.4 F

## 2019-10-17 DIAGNOSIS — C44.41: Primary | ICD-10-CM

## 2019-10-17 DIAGNOSIS — C34.90: ICD-10-CM

## 2019-10-17 PROCEDURE — 88341 IMHCHEM/IMCYTCHM EA ADD ANTB: CPT

## 2019-10-17 PROCEDURE — 21550 BIOPSY OF NECK/CHEST: CPT

## 2019-10-17 PROCEDURE — 88342 IMHCHEM/IMCYTCHM 1ST ANTB: CPT

## 2019-10-17 PROCEDURE — 88305 TISSUE EXAM BY PATHOLOGIST: CPT

## 2019-10-17 NOTE — US
ULTRASOUND GUIDED CORE BIOPSY LEFT NECK MASS:

 

CLINICAL HISTORY: Left neck mass

 

FINDINGS: 

The procedure was explained to the patient.  The risks, complications, benefits and alternatives were
 discussed and any questions were answered.  Informed consent was obtained.  Patient was placed supin
e on the ultrasound table and prepped and draped in the usual sterile fashion.  Utilizing a 18 gauge 
needle, three passes were made into the left neck mass.  

 

Patient was stable throughout the procedure.  Pathology is pending.

 

All elements of maximal barrier and sterile technique were utilized.

 

IMPRESSION: 

1.  Successful ultrasound guided core biopsy of a left neck mass. Pathology pending.

## 2019-10-19 ENCOUNTER — HOSPITAL ENCOUNTER (OUTPATIENT)
Dept: HOSPITAL 47 - RADPETMAIN | Age: 69
Discharge: HOME | End: 2019-10-19
Attending: OTOLARYNGOLOGY
Payer: MEDICARE

## 2019-10-19 DIAGNOSIS — R22.1: Primary | ICD-10-CM

## 2019-10-19 DIAGNOSIS — D49.89: ICD-10-CM

## 2019-10-19 DIAGNOSIS — R91.8: ICD-10-CM

## 2019-10-19 PROCEDURE — 78815 PET IMAGE W/CT SKULL-THIGH: CPT

## 2019-10-23 NOTE — PE
Nuclear medicine PET/CT

 

HISTORY: Cervical lymph node neoplasm, head and neck cancer, subsequent

 

Patient received 12 mCi F-18 FDG intravenously in delayed scanning was performed from skull base to t
he mid thighs. Localization and attenuation correction CT scan was performed. Small field-of-view lynette
ges obtained through the head and neck.

 

Correlation CT abdomen pelvis 10/5/2019, CT chest 10/4/2019

 

Head and neck: There is a large mass posterior to the left sternocleidomastoid muscle below the level
 of the hyoid bone measuring approximately 5.2 cm extending to the skin posterior to the left carotid
 artery bifurcation with associated hypermetabolic uptake, SUV is 7.4, uptake is peripheral. There is
 extension of these abnormal soft tissue posterior to the left internal carotid artery toward the bas
e of the skull medially to the level immediately anterior to the lateral mass of C2, SUV 18.4. At thi
s level superficial to the parotid gland on the is a small focal area of hypermetabolic uptake, SUV 5
.3. At the level of the base of the neck to the left of midline at the level of the scapula there is 
a soft tissue focus measuring 2.3 cm which shows associated hypermetabolic uptake within the subcutan
eous fat, SUV is 4.1.

 

Vocal cords show an asymmetric appearance, correlate for possible local cord paralysis. Hypermetaboli
c uptake seen unilaterally on the right. Subcentimeter supraclavicular node on the left shows some mi
ld uptake, SUV only 1.5.

 

In the right upper lobe there is a soft tissue mass present with associated hypermetabolic uptake in 
the posterior right upper lobe measuring 2.4 cm, SUV 8.1, there is extension towards the anterior asp
ect of the right upper lobe with some pleural extension is also noted, spiculated margin, mass measur
es 3.8 cm and shows an SUV of 9.4.

 

There are coronary artery calcifications. No pleural pericardial effusion

 

ABDOMEN: Question small low dense focus in the right adrenal gland likely an adenoma. No retroperiton
eal adenopathy. The liver shows no mass. There is no ascites. No suspicious hypermetabolic uptake

 

Osseous show postop change in the right hip, the recent uptake about the prosthesis which may be due 
to postop change, stress change.

 

Impression: Hypermetabolic uptake right upper lobe, in the left neck as described.

## 2019-10-30 ENCOUNTER — HOSPITAL ENCOUNTER (EMERGENCY)
Dept: HOSPITAL 47 - EC | Age: 69
Discharge: HOME | End: 2019-10-30
Payer: MEDICARE

## 2019-10-30 VITALS — TEMPERATURE: 97.7 F | RESPIRATION RATE: 18 BRPM

## 2019-10-30 VITALS — SYSTOLIC BLOOD PRESSURE: 144 MMHG | DIASTOLIC BLOOD PRESSURE: 86 MMHG | HEART RATE: 64 BPM

## 2019-10-30 DIAGNOSIS — I10: Primary | ICD-10-CM

## 2019-10-30 DIAGNOSIS — Z85.118: ICD-10-CM

## 2019-10-30 DIAGNOSIS — Z96.641: ICD-10-CM

## 2019-10-30 DIAGNOSIS — Z85.828: ICD-10-CM

## 2019-10-30 PROCEDURE — 99283 EMERGENCY DEPT VISIT LOW MDM: CPT

## 2019-10-30 PROCEDURE — 96361 HYDRATE IV INFUSION ADD-ON: CPT

## 2019-10-30 PROCEDURE — 96374 THER/PROPH/DIAG INJ IV PUSH: CPT

## 2019-10-30 NOTE — ED
Recheck HPI





- General


Chief Complaint: Recheck/Abnormal Lab/Rx


Stated Complaint: elevated SAMANTHA


Time Seen by Provider: 10/30/19 09:36


Source: patient


Mode of arrival: wheelchair


Limitations: no limitations





- History of Present Illness


Initial Comments: 


69-year-old male history of hypertension, had episode of recent hypotension with

admission, basal cell carcinoma of left-sided neck with metastases to the lung 

presents emergency department for chief complaint of elevated blood pressure 

reading.  Patient states he had an elevated blood pressure reading today he 

states the systolic level was 212 he states this was concerning to him.  Patient

states that his primary care provider has recently been changing his medications

secondary to a recent hospital admission for hypotension which she states was 

attributed to dehydration.  Patient states that since his blood pressure has 

been significant elevated he is told to take clonidine 0.1 mg 3 times a day as 

needed for elevated blood pressure.  Patient states that he has always had a 

chronic headache since he has had the large growth left seventh neck he feels is

due to tension he states he has had outpatient MRI and CT within the last month 

for evaluation of these chronic still aching headaches he denies any changes 

today.  Denies any visual changes weakness the upper lower extremities speech 

changes nausea.  He states he was not alarmed by the mild headache but rather 

was asked in triage.  Patient denies any chest pain shortness of breath nausea 

vomiting abdominal pain and epigastric pain back pain difficulty urinating or 

decreased output. denies any other complaints states he is feeling like his 

usual self. Patient appears well on arrival. BP elevated, HR WNL. 








- Related Data


                                Home Medications











 Medication  Instructions  Recorded  Confirmed


 


Albuterol Inhaler [Ventolin Hfa 1 - 2 puff INHALATION RT-Q4H PRN 10/04/19 

10/30/19





Inhaler]   


 


cloNIDine HCL [Catapres] 0.1 mg PO TID PRN 10/30/19 10/30/19











                                    Allergies











Allergy/AdvReac Type Severity Reaction Status Date / Time


 


No Known Allergies Allergy   Verified 10/30/19 10:22














Review of Systems


ROS Statement: 


Those systems with pertinent positive or pertinent negative responses have been 

documented in the HPI.





ROS Other: All systems not noted in ROS Statement are negative.





Past Medical History


Past Medical History: Cancer, Hypertension


Additional Past Medical History / Comment(s): HX SKIN CA, CURRENT LESION ON LEFT

CHEEK, lung CA, CA in the lymph nodes


History of Any Multi-Drug Resistant Organisms: None Reported


Past Surgical History: Back Surgery, Heart Catheterization, Joint Replacement, 

Orthopedic Surgery


Additional Past Surgical History / Comment(s): PREVIOUS SKIN CA LESIONS REMOVED,

R HIP REPLACEMENT, ANKLE ORIF WITH METAL L, lung biopsy, neck biopsy


Past Anesthesia/Blood Transfusion Reactions: Previous Problems w/ Anesthesia


Additional Past Anesthesia/Blood Transfusion Reaction / Comment(s): Pt woke up 

violently, which is out of characteristic for the pt


Past Psychological History: No Psychological Hx Reported


Smoking Status: Never smoker


Past Alcohol Use History: Occasional


Past Drug Use History: None Reported





- Past Family History


  ** Mother


Family Medical History: Cancer, Deep Vein Thrombosis (DVT)


Additional Family Medical History / Comment(s): BREAST CA





General Exam





- General Exam Comments


Initial Comments: 


General:  The patient is awake and alert, in no distress, and does not appear 

acutely ill. 


Eye:  +3 mm pupils are equal, round and reactive to light, extra-ocular 

movements are intact.  No nystagmus.  There is normal conjunctiva bilaterally.  

No signs of icterus.  


Ears, nose, mouth and throat:  There are moist mucous membranes and no oral 

lesions. 


Neck:  The neck is supple, there is no tenderness or JVD.  


Cardiovascular:  There is a regular rate and rhythm. No murmur, rub or gallop is

appreciated.


Respiratory:  Lungs are clear to auscultation, respirations are non-labored, 

breath sounds are equal.  No wheezes, stridor, rales, or rhonchi.


Gastrointestinal:  Soft, non-distended, non-tender abdomen without masses or 

organomegaly noted. There is no rebound or guarding present.  No pulsatile 

masses


Musculoskeletal:  Normal ROM, no tenderness.  Strength 5/5 of the UE and LE b/l.

Sensation intact of the UE and LE b/l. Radial pulses equal bilaterally 2+.  


Neurological:  A&O x 3. CN II-XII intact, There are no obvious motor or sensory 

deficits. Coordination appears grossly intact. Speech is normal.


Skin:  Skin is warm and dry and no rashes. Lesion red, circular raised with 

pearly borders (irregular) of the left side of neck. No LE edema.


Psychiatric:  Cooperative, appropriate mood & affect, normal judgment.  





Limitations: no limitations





Course


                                   Vital Signs











  10/30/19 10/30/19 10/30/19





  09:28 10:15 10:20


 


Temperature 97.7 F  


 


Pulse Rate 66 62 65


 


Respiratory 18 18 18





Rate   


 


Blood Pressure 184/101 151/98 151/98


 


O2 Sat by Pulse 98 98 99





Oximetry   














  10/30/19





  10:26


 


Temperature 


 


Pulse Rate 63


 


Respiratory 18





Rate 


 


Blood Pressure 125/82


 


O2 Sat by Pulse 100





Oximetry 














Medical Decision Making





- Medical Decision Making


69-year-old male presented emergency department for elevated blood pressure 

reading.  Patient denies any new symptoms.  No clinical history of end organ 

damage.  Patient appears well heart within normal limits.  Patient is given a 

single dose of hydralazine and monitored in the emergency department.  Blood 

pressure within acceptable limits.  At this time feel patient is stable for 

discharge as he is asymptomatic to follow-up with his primary care provider.  

Patient is agreeable to this Plan I recommend patient keep a blood pressure 

diary.  Return parameters were discussed at length patient verbalized 

understanding and was discharged appearing well after discussed the case with at

tending provider Dr. Gray in detail.








Disposition


Clinical Impression: 


 Elevated blood pressure reading





Disposition: HOME SELF-CARE


Condition: Good


Instructions (If sedation given, give patient instructions):  Low-Sodium Diet 

(ED), Hypertensive Crisis (ED)


Additional Instructions: 


Please use medication as discussed.  Please follow-up with family doctor in the 

next 24-48 hours for adjustment of medications-keep blood pressure log.  Please 

return to emergency room if the symptoms increase or worsen or for any other 

concerns.


Is patient prescribed a controlled substance at d/c from ED?: No


Referrals: 


Mina Mata DO [Primary Care Provider] - 1-2 days


Time of Disposition: 10:53

## 2019-10-31 ENCOUNTER — HOSPITAL ENCOUNTER (INPATIENT)
Dept: HOSPITAL 47 - EC | Age: 69
LOS: 3 days | Discharge: HOME | DRG: 312 | End: 2019-11-03
Attending: INTERNAL MEDICINE | Admitting: INTERNAL MEDICINE
Payer: MEDICARE

## 2019-10-31 VITALS — BODY MASS INDEX: 27.8 KG/M2

## 2019-10-31 DIAGNOSIS — T46.5X5A: ICD-10-CM

## 2019-10-31 DIAGNOSIS — I10: ICD-10-CM

## 2019-10-31 DIAGNOSIS — C76.0: ICD-10-CM

## 2019-10-31 DIAGNOSIS — C34.10: ICD-10-CM

## 2019-10-31 DIAGNOSIS — Z96.641: ICD-10-CM

## 2019-10-31 DIAGNOSIS — Z80.3: ICD-10-CM

## 2019-10-31 DIAGNOSIS — I95.2: Primary | ICD-10-CM

## 2019-10-31 DIAGNOSIS — Z79.899: ICD-10-CM

## 2019-10-31 DIAGNOSIS — C79.89: ICD-10-CM

## 2019-10-31 DIAGNOSIS — Z77.090: ICD-10-CM

## 2019-10-31 DIAGNOSIS — K21.9: ICD-10-CM

## 2019-10-31 DIAGNOSIS — E87.2: ICD-10-CM

## 2019-10-31 DIAGNOSIS — Z85.828: ICD-10-CM

## 2019-10-31 LAB
ALBUMIN SERPL-MCNC: 4.1 G/DL (ref 3.5–5)
ALP SERPL-CCNC: 68 U/L (ref 38–126)
ALT SERPL-CCNC: 22 U/L (ref 21–72)
ANION GAP SERPL CALC-SCNC: 13 MMOL/L
APTT BLD: 24.2 SEC (ref 22–30)
AST SERPL-CCNC: 31 U/L (ref 17–59)
BASOPHILS # BLD AUTO: 0.1 K/UL (ref 0–0.2)
BASOPHILS NFR BLD AUTO: 1 %
BUN SERPL-SCNC: 8 MG/DL (ref 9–20)
CALCIUM SPEC-MCNC: 9.4 MG/DL (ref 8.4–10.2)
CHLORIDE SERPL-SCNC: 100 MMOL/L (ref 98–107)
CO2 SERPL-SCNC: 24 MMOL/L (ref 22–30)
EOSINOPHIL # BLD AUTO: 0 K/UL (ref 0–0.7)
EOSINOPHIL NFR BLD AUTO: 0 %
ERYTHROCYTE [DISTWIDTH] IN BLOOD BY AUTOMATED COUNT: 4.85 M/UL (ref 4.3–5.9)
ERYTHROCYTE [DISTWIDTH] IN BLOOD: 15.8 % (ref 11.5–15.5)
GLUCOSE SERPL-MCNC: 113 MG/DL (ref 74–99)
HCO3 BLDV-SCNC: 24 MMOL/L (ref 24–28)
HCT VFR BLD AUTO: 36.8 % (ref 39–53)
HGB BLD-MCNC: 11.1 GM/DL (ref 13–17.5)
HYALINE CASTS UR QL AUTO: 4 /LPF (ref 0–2)
INR PPP: 1 (ref ?–1.2)
LYMPHOCYTES # SPEC AUTO: 2.1 K/UL (ref 1–4.8)
LYMPHOCYTES NFR SPEC AUTO: 22 %
MAGNESIUM SPEC-SCNC: 2.1 MG/DL (ref 1.6–2.3)
MCH RBC QN AUTO: 22.8 PG (ref 25–35)
MCHC RBC AUTO-ENTMCNC: 30.1 G/DL (ref 31–37)
MCV RBC AUTO: 75.9 FL (ref 80–100)
MONOCYTES # BLD AUTO: 0.6 K/UL (ref 0–1)
MONOCYTES NFR BLD AUTO: 6 %
NEUTROPHILS # BLD AUTO: 6.4 K/UL (ref 1.3–7.7)
NEUTROPHILS NFR BLD AUTO: 67 %
PCO2 BLDV: 30 MMHG (ref 37–51)
PH BLDV: 7.51 [PH] (ref 7.31–7.41)
PH UR: 7 [PH] (ref 5–8)
PLATELET # BLD AUTO: 357 K/UL (ref 150–450)
POTASSIUM SERPL-SCNC: 3.7 MMOL/L (ref 3.5–5.1)
PROT SERPL-MCNC: 7.7 G/DL (ref 6.3–8.2)
PT BLD: 10.5 SEC (ref 9–12)
RBC UR QL: 34 /HPF (ref 0–5)
SODIUM SERPL-SCNC: 137 MMOL/L (ref 137–145)
SP GR UR: 1.01 (ref 1–1.03)
UROBILINOGEN UR QL STRIP: <2 MG/DL (ref ?–2)
WBC # BLD AUTO: 9.6 K/UL (ref 3.8–10.6)
WBC #/AREA URNS HPF: 8 /HPF (ref 0–5)

## 2019-10-31 PROCEDURE — 80053 COMPREHEN METABOLIC PANEL: CPT

## 2019-10-31 PROCEDURE — 85027 COMPLETE CBC AUTOMATED: CPT

## 2019-10-31 PROCEDURE — 71046 X-RAY EXAM CHEST 2 VIEWS: CPT

## 2019-10-31 PROCEDURE — 80048 BASIC METABOLIC PNL TOTAL CA: CPT

## 2019-10-31 PROCEDURE — 99285 EMERGENCY DEPT VISIT HI MDM: CPT

## 2019-10-31 PROCEDURE — 82803 BLOOD GASES ANY COMBINATION: CPT

## 2019-10-31 PROCEDURE — 94760 N-INVAS EAR/PLS OXIMETRY 1: CPT

## 2019-10-31 PROCEDURE — 85025 COMPLETE CBC W/AUTO DIFF WBC: CPT

## 2019-10-31 PROCEDURE — 87040 BLOOD CULTURE FOR BACTERIA: CPT

## 2019-10-31 PROCEDURE — 81001 URINALYSIS AUTO W/SCOPE: CPT

## 2019-10-31 PROCEDURE — 84484 ASSAY OF TROPONIN QUANT: CPT

## 2019-10-31 PROCEDURE — 84443 ASSAY THYROID STIM HORMONE: CPT

## 2019-10-31 PROCEDURE — 83735 ASSAY OF MAGNESIUM: CPT

## 2019-10-31 PROCEDURE — 83835 ASSAY OF METANEPHRINES: CPT

## 2019-10-31 PROCEDURE — 36415 COLL VENOUS BLD VENIPUNCTURE: CPT

## 2019-10-31 PROCEDURE — 83519 RIA NONANTIBODY: CPT

## 2019-10-31 PROCEDURE — 93005 ELECTROCARDIOGRAM TRACING: CPT

## 2019-10-31 PROCEDURE — 85730 THROMBOPLASTIN TIME PARTIAL: CPT

## 2019-10-31 PROCEDURE — 85610 PROTHROMBIN TIME: CPT

## 2019-10-31 PROCEDURE — 96360 HYDRATION IV INFUSION INIT: CPT

## 2019-10-31 PROCEDURE — 83605 ASSAY OF LACTIC ACID: CPT

## 2019-10-31 RX ADMIN — ACETAMINOPHEN PRN MG: 325 TABLET, FILM COATED ORAL at 23:59

## 2019-10-31 RX ADMIN — CEFAZOLIN SCH: 330 INJECTION, POWDER, FOR SOLUTION INTRAMUSCULAR; INTRAVENOUS at 17:32

## 2019-10-31 NOTE — XR
EXAMINATION TYPE: XR chest 2V

 

DATE OF EXAM: 10/31/2019

 

COMPARISON: 10/4/2019

 

HISTORY: Shortness of breath

 

TECHNIQUE:  Frontal and lateral views of the chest are obtained.

 

FINDINGS:

 

Scattered senescent parenchymal changes noted. Hyperinflation compatible with COPD. 

 

No evidence for infiltrate. No evidence for atelectasis.

 

Heart size is stable.

 

Mediastinal structures are stable and grossly unremarkable.

 

No evidence for hilar prominence.

 

Degenerative changes dorsal spine. 

 

IMPRESSION:

1. No evidence for acute pulmonary disease.

## 2019-10-31 NOTE — ED
General Adult HPI





- General


Chief complaint: Syncope


Stated complaint: syncope


Time Seen by Provider: 10/31/19 14:14


Source: patient, family, EMS


Mode of arrival: EMS


Limitations: no limitations





- History of Present Illness


Initial comments: 





Dictation was produced using dragon dictation software. please excuse any 

grammatical, word or spelling errors. 





Chief Complaint: 69-year-old male transferred from [facility] for dizziness and 

hypertension.





History of Present Illness: Patient is a 69-year-old male.  He has past medical 

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  He

was having symptoms of presyncope and lightheadedness over the last 48 hours.  

He was seen at the cancer center today.  He has no pain complaints.  He does 

complain of a mild burning sensation to his left shoulder however.  Denies any 

constitutional symptoms.  No abdominal pain.








The ROS documented in this emergency department record has been reviewed and 

confirmed by me.  Those systems with pertinent positive or negative responses 

have been documented in the HPI.  All other systems are other negative and/or no

ncontributory.








PHYSICAL EXAM:


General Impression: Alert and oriented x3, not in acute distress, pale


HEENT: Normocephalic atraumatic, extra-ocular movements intact, pupils equal and

reactive to light bilaterally, dry mucous membranes


Cardiovascular: Heart regular rate and rhythm, S1&S2 audible, no murmurs, rubs 

or gallops


Chest: Lungs clear to auscultation bilaterally, no rhonchi, no wheeze, no rales


Abdomen: Bowel sounds present, abdomen soft, non-tender, non-distended, no 

organomegaly


Musculoskeletal: Pulses present and equal in all extremities, no peripheral 

edema


Motor:  no focal deficits noted


Neurological: CN II-XII grossly intact, no focal motor or sensory deficits noted


Skin: Intact with no visualized rashes


Psych: Normal affect and mood





ED course: 69-year-old male presents with feelings of lightheadedness and 

presyncope.  All signs upon arrival shows blood pressure of 86/61, rest of vital

signs within except limits.  Patient is brought to resuscitation today.  2 

large-bore IVs were placed.  Patient started on boluses of fluid.  Plan care 

bedside ultrasound was performed showing no pericardial effusion.  No findings 

of pulmonary edema.  No obvious RV dilatation.  Blood pressure was checked with 

leg raising technique with improvement.  Patient is responding fluids.  Repeat 

blood pressures are improved with measurement of 118/75.


Laboratory evaluation obtained.  Chest x-ray is nonacute.  Hemoglobin 11.1, CBC 

is unremarkable.  Cardiac panel negative.  Blood gas shows pH of 7.5 with a pCO2

of 30.  Metabolic panel shows lactic acidosis of 5.3.  Rest metabolic panel is 

unremarkable.  Urinalysis shows 8 white blood cells with rare bacteria.  Patient

does not have any urinary symptoms.  No ketonuria.  This point is very unclear 

what is causing patient's symptoms.  He does have a lactic acidosis of 5.3.  

Blood pressures improved after intravenous fluids.  We will admit patient for 

medical monitoring, IV hydration and consultation to oncology.  She 

understandable agreeable with disposition.  Pending blood cultures.  Patient 

reevaluated at bedside with stable medical condition.  Patient currently feels 

at baseline.  He has no complaints.





EKG interpretation: Ventricular rate 73, normal sinus rhythm,.  160, QS 94, QTC 

478. No MD prolongation, no QTC prolongation, no ST or T-wave changes noted.  

EKG compared to 10/04/2019 showing no changes.  Overall, this EKG is 

unremarkable








- Related Data


                                Home Medications











 Medication  Instructions  Recorded  Confirmed


 


Albuterol Inhaler [Ventolin Hfa 2 puff INHALATION RT-Q4H PRN 10/04/19 10/31/19





Inhaler]   


 


cloNIDine HCL [Catapres] 0.1 mg PO TID PRN 10/30/19 10/31/19


 


Acetaminophen [Tylenol Arthritis] 650 mg PO BID PRN 10/31/19 10/31/19











                                    Allergies











Allergy/AdvReac Type Severity Reaction Status Date / Time


 


No Known Allergies Allergy   Verified 10/31/19 14:12














Review of Systems


ROS Statement: 


Those systems with pertinent positive or pertinent negative responses have been 

documented in the HPI.





ROS Other: All systems not noted in ROS Statement are negative.





Past Medical History


Past Medical History: Cancer, Hypertension


Additional Past Medical History / Comment(s): HX SKIN CA, CURRENT LESION ON LEFT

CHEEK, lung CA, CA in the lymph nodes


History of Any Multi-Drug Resistant Organisms: None Reported


Past Surgical History: Back Surgery, Heart Catheterization, Joint Replacement, 

Orthopedic Surgery


Additional Past Surgical History / Comment(s): PREVIOUS SKIN CA LESIONS REMOVED,

R HIP REPLACEMENT, ANKLE ORIF WITH METAL L, lung biopsy, neck biopsy


Past Anesthesia/Blood Transfusion Reactions: Previous Problems w/ Anesthesia


Additional Past Anesthesia/Blood Transfusion Reaction / Comment(s): Pt woke up 

violently, which is out of characteristic for the pt


Past Psychological History: No Psychological Hx Reported


Smoking Status: Never smoker


Past Alcohol Use History: Occasional


Past Drug Use History: None Reported





- Past Family History


  ** Mother


Family Medical History: Cancer, Deep Vein Thrombosis (DVT)


Additional Family Medical History / Comment(s): BREAST CA





General Exam


Limitations: no limitations





Course


                                   Vital Signs











  10/31/19 10/31/19 10/31/19





  14:02 14:10 14:15


 


Temperature 98.0 F  


 


Pulse Rate 75 66 78


 


Respiratory 22 22 22





Rate   


 


Blood Pressure 86/61 64/49 84/57


 


O2 Sat by Pulse 99 97 99





Oximetry   














  10/31/19 10/31/19 10/31/19





  14:20 14:25 14:30


 


Temperature   


 


Pulse Rate 67 65 68


 


Respiratory 20 20 20





Rate   


 


Blood Pressure 69/49 63/52 90/64


 


O2 Sat by Pulse 100 99 100





Oximetry   














  10/31/19 10/31/19 10/31/19





  14:34 14:40 15:00


 


Temperature   


 


Pulse Rate 64 72 73


 


Respiratory 20 20 18





Rate   


 


Blood Pressure 118/75 151/79 145/74


 


O2 Sat by Pulse 100 99 100





Oximetry   














  10/31/19 10/31/19





  15:14 15:30


 


Temperature  


 


Pulse Rate 74 75


 


Respiratory 20 18





Rate  


 


Blood Pressure 146/84 138/84


 


O2 Sat by Pulse 100 100





Oximetry  














Medical Decision Making





- Lab Data


Result diagrams: 


                                 10/31/19 14:13





                                 10/31/19 14:13


                                   Lab Results











  10/31/19 10/31/19 10/31/19 Range/Units





  14:13 14:13 14:13 


 


WBC  9.6    (3.8-10.6)  k/uL


 


RBC  4.85    (4.30-5.90)  m/uL


 


Hgb  11.1 L    (13.0-17.5)  gm/dL


 


Hct  36.8 L    (39.0-53.0)  %


 


MCV  75.9 L    (80.0-100.0)  fL


 


MCH  22.8 L    (25.0-35.0)  pg


 


MCHC  30.1 L    (31.0-37.0)  g/dL


 


RDW  15.8 H    (11.5-15.5)  %


 


Plt Count  357    (150-450)  k/uL


 


Neutrophils %  67    %


 


Lymphocytes %  22    %


 


Monocytes %  6    %


 


Eosinophils %  0    %


 


Basophils %  1    %


 


Neutrophils #  6.4    (1.3-7.7)  k/uL


 


Lymphocytes #  2.1    (1.0-4.8)  k/uL


 


Monocytes #  0.6    (0-1.0)  k/uL


 


Eosinophils #  0.0    (0-0.7)  k/uL


 


Basophils #  0.1    (0-0.2)  k/uL


 


Hypochromasia  Marked    


 


Microcytosis  Slight    


 


PT     (9.0-12.0)  sec


 


INR     (<1.2)  


 


APTT     (22.0-30.0)  sec


 


VBG pH     (7.31-7.41)  


 


VBG pCO2     (37-51)  mmHg


 


VBG HCO3     (24-28)  mmol/L


 


Sodium   137   (137-145)  mmol/L


 


Potassium   3.7   (3.5-5.1)  mmol/L


 


Chloride   100   ()  mmol/L


 


Carbon Dioxide   24   (22-30)  mmol/L


 


Anion Gap   13   mmol/L


 


BUN   8 L   (9-20)  mg/dL


 


Creatinine   1.01   (0.66-1.25)  mg/dL


 


Est GFR (CKD-EPI)AfAm   89   (>60 ml/min/1.73 sqM)  


 


Est GFR (CKD-EPI)NonAf   77   (>60 ml/min/1.73 sqM)  


 


Glucose   113 H   (74-99)  mg/dL


 


Plasma Lactic Acid Javi    5.3 H*  (0.7-2.0)  mmol/L


 


Calcium   9.4   (8.4-10.2)  mg/dL


 


Magnesium   2.1   (1.6-2.3)  mg/dL


 


Total Bilirubin   0.7   (0.2-1.3)  mg/dL


 


AST   31   (17-59)  U/L


 


ALT   22   (21-72)  U/L


 


Alkaline Phosphatase   68   ()  U/L


 


Troponin I     (0.000-0.034)  ng/mL


 


Total Protein   7.7   (6.3-8.2)  g/dL


 


Albumin   4.1   (3.5-5.0)  g/dL


 


Urine Color     


 


Urine Appearance     (Clear)  


 


Urine pH     (5.0-8.0)  


 


Ur Specific Gravity     (1.001-1.035)  


 


Urine Protein     (Negative)  


 


Urine Glucose (UA)     (Negative)  


 


Urine Ketones     (Negative)  


 


Urine Blood     (Negative)  


 


Urine Nitrite     (Negative)  


 


Urine Bilirubin     (Negative)  


 


Urine Urobilinogen     (<2.0)  mg/dL


 


Ur Leukocyte Esterase     (Negative)  


 


Urine RBC     (0-5)  /hpf


 


Urine WBC     (0-5)  /hpf


 


Urine Bacteria     (None)  /hpf


 


Hyaline Casts     (0-2)  /lpf


 


Urine Mucus     (None)  /hpf


 


Urine Yeast (Budding)     (None)  /hpf














  10/31/19 10/31/19 10/31/19 Range/Units





  14:13 14:13 14:13 


 


WBC     (3.8-10.6)  k/uL


 


RBC     (4.30-5.90)  m/uL


 


Hgb     (13.0-17.5)  gm/dL


 


Hct     (39.0-53.0)  %


 


MCV     (80.0-100.0)  fL


 


MCH     (25.0-35.0)  pg


 


MCHC     (31.0-37.0)  g/dL


 


RDW     (11.5-15.5)  %


 


Plt Count     (150-450)  k/uL


 


Neutrophils %     %


 


Lymphocytes %     %


 


Monocytes %     %


 


Eosinophils %     %


 


Basophils %     %


 


Neutrophils #     (1.3-7.7)  k/uL


 


Lymphocytes #     (1.0-4.8)  k/uL


 


Monocytes #     (0-1.0)  k/uL


 


Eosinophils #     (0-0.7)  k/uL


 


Basophils #     (0-0.2)  k/uL


 


Hypochromasia     


 


Microcytosis     


 


PT  10.5    (9.0-12.0)  sec


 


INR  1.0    (<1.2)  


 


APTT  24.2    (22.0-30.0)  sec


 


VBG pH    7.51 H  (7.31-7.41)  


 


VBG pCO2    30 L  (37-51)  mmHg


 


VBG HCO3    24  (24-28)  mmol/L


 


Sodium     (137-145)  mmol/L


 


Potassium     (3.5-5.1)  mmol/L


 


Chloride     ()  mmol/L


 


Carbon Dioxide     (22-30)  mmol/L


 


Anion Gap     mmol/L


 


BUN     (9-20)  mg/dL


 


Creatinine     (0.66-1.25)  mg/dL


 


Est GFR (CKD-EPI)AfAm     (>60 ml/min/1.73 sqM)  


 


Est GFR (CKD-EPI)NonAf     (>60 ml/min/1.73 sqM)  


 


Glucose     (74-99)  mg/dL


 


Plasma Lactic Acid Javi     (0.7-2.0)  mmol/L


 


Calcium     (8.4-10.2)  mg/dL


 


Magnesium     (1.6-2.3)  mg/dL


 


Total Bilirubin     (0.2-1.3)  mg/dL


 


AST     (17-59)  U/L


 


ALT     (21-72)  U/L


 


Alkaline Phosphatase     ()  U/L


 


Troponin I   <0.012   (0.000-0.034)  ng/mL


 


Total Protein     (6.3-8.2)  g/dL


 


Albumin     (3.5-5.0)  g/dL


 


Urine Color     


 


Urine Appearance     (Clear)  


 


Urine pH     (5.0-8.0)  


 


Ur Specific Gravity     (1.001-1.035)  


 


Urine Protein     (Negative)  


 


Urine Glucose (UA)     (Negative)  


 


Urine Ketones     (Negative)  


 


Urine Blood     (Negative)  


 


Urine Nitrite     (Negative)  


 


Urine Bilirubin     (Negative)  


 


Urine Urobilinogen     (<2.0)  mg/dL


 


Ur Leukocyte Esterase     (Negative)  


 


Urine RBC     (0-5)  /hpf


 


Urine WBC     (0-5)  /hpf


 


Urine Bacteria     (None)  /hpf


 


Hyaline Casts     (0-2)  /lpf


 


Urine Mucus     (None)  /hpf


 


Urine Yeast (Budding)     (None)  /hpf














  10/31/19 Range/Units





  15:30 


 


WBC   (3.8-10.6)  k/uL


 


RBC   (4.30-5.90)  m/uL


 


Hgb   (13.0-17.5)  gm/dL


 


Hct   (39.0-53.0)  %


 


MCV   (80.0-100.0)  fL


 


MCH   (25.0-35.0)  pg


 


MCHC   (31.0-37.0)  g/dL


 


RDW   (11.5-15.5)  %


 


Plt Count   (150-450)  k/uL


 


Neutrophils %   %


 


Lymphocytes %   %


 


Monocytes %   %


 


Eosinophils %   %


 


Basophils %   %


 


Neutrophils #   (1.3-7.7)  k/uL


 


Lymphocytes #   (1.0-4.8)  k/uL


 


Monocytes #   (0-1.0)  k/uL


 


Eosinophils #   (0-0.7)  k/uL


 


Basophils #   (0-0.2)  k/uL


 


Hypochromasia   


 


Microcytosis   


 


PT   (9.0-12.0)  sec


 


INR   (<1.2)  


 


APTT   (22.0-30.0)  sec


 


VBG pH   (7.31-7.41)  


 


VBG pCO2   (37-51)  mmHg


 


VBG HCO3   (24-28)  mmol/L


 


Sodium   (137-145)  mmol/L


 


Potassium   (3.5-5.1)  mmol/L


 


Chloride   ()  mmol/L


 


Carbon Dioxide   (22-30)  mmol/L


 


Anion Gap   mmol/L


 


BUN   (9-20)  mg/dL


 


Creatinine   (0.66-1.25)  mg/dL


 


Est GFR (CKD-EPI)AfAm   (>60 ml/min/1.73 sqM)  


 


Est GFR (CKD-EPI)NonAf   (>60 ml/min/1.73 sqM)  


 


Glucose   (74-99)  mg/dL


 


Plasma Lactic Acid Javi   (0.7-2.0)  mmol/L


 


Calcium   (8.4-10.2)  mg/dL


 


Magnesium   (1.6-2.3)  mg/dL


 


Total Bilirubin   (0.2-1.3)  mg/dL


 


AST   (17-59)  U/L


 


ALT   (21-72)  U/L


 


Alkaline Phosphatase   ()  U/L


 


Troponin I   (0.000-0.034)  ng/mL


 


Total Protein   (6.3-8.2)  g/dL


 


Albumin   (3.5-5.0)  g/dL


 


Urine Color  Yellow  


 


Urine Appearance  Clear  (Clear)  


 


Urine pH  7.0  (5.0-8.0)  


 


Ur Specific Gravity  1.006  (1.001-1.035)  


 


Urine Protein  Trace H  (Negative)  


 


Urine Glucose (UA)  Negative  (Negative)  


 


Urine Ketones  Negative  (Negative)  


 


Urine Blood  Moderate H  (Negative)  


 


Urine Nitrite  Negative  (Negative)  


 


Urine Bilirubin  Negative  (Negative)  


 


Urine Urobilinogen  <2.0  (<2.0)  mg/dL


 


Ur Leukocyte Esterase  Negative  (Negative)  


 


Urine RBC  34 H  (0-5)  /hpf


 


Urine WBC  8 H  (0-5)  /hpf


 


Urine Bacteria  Rare H  (None)  /hpf


 


Hyaline Casts  4 H  (0-2)  /lpf


 


Urine Mucus  Few H  (None)  /hpf


 


Urine Yeast (Budding)  Occasional H  (None)  /hpf














Disposition


Clinical Impression: 


 Hypotension





Disposition: ADMITTED AS IP TO THIS [facility]


Condition: Fair


Referrals: 


Mina Mata DO [Primary Care Provider] - 1-2 days


Decision Time: 16:07

## 2019-11-01 RX ADMIN — CEFAZOLIN SCH MLS/HR: 330 INJECTION, POWDER, FOR SOLUTION INTRAMUSCULAR; INTRAVENOUS at 11:13

## 2019-11-01 RX ADMIN — CEFAZOLIN SCH MLS/HR: 330 INJECTION, POWDER, FOR SOLUTION INTRAMUSCULAR; INTRAVENOUS at 23:25

## 2019-11-01 RX ADMIN — CEFAZOLIN SCH MLS/HR: 330 INJECTION, POWDER, FOR SOLUTION INTRAMUSCULAR; INTRAVENOUS at 00:01

## 2019-11-01 RX ADMIN — ACETAMINOPHEN PRN MG: 325 TABLET, FILM COATED ORAL at 12:19

## 2019-11-01 RX ADMIN — HEPARIN SODIUM SCH UNIT: 5000 INJECTION, SOLUTION INTRAVENOUS; SUBCUTANEOUS at 21:25

## 2019-11-01 RX ADMIN — DEXAMETHASONE SCH MG: 4 TABLET ORAL at 21:25
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
## 2019-11-01 NOTE — P.NPCON
History of Present Illness





- Reason for Consult


accelerated hypertension





- History of Present Illness





Reason for consultation: Labile hypertension





History of present illness:


Patient is a 69-year-old male seen in consultation for labile hypertension.  

Patient has history of lung cancer.  Patient states he was at [facility] yesterday

and became dizzy and lightheaded.  Patient's blood pressure was in the systolic 

60s.  He received IV fluid boluses and blood pressure improved.  He denies any 

syncopal episodes.  Patient's orthostatic vital signs were negative.  Patient's 

most recent blood pressure was 166/91.  Patient states last night he became sick

to his stomach and vomited.  He also felt dizzy.  He is currently receiving 

normal saline at 75 mL an hour.  He denies any diarrhea.  Oral intake is good.  

No hematuria or dysuria.  No history of renal disease.  He is currently 

receiving hydrochlorothiazide and hydralazine.  At home it appears he was taking

clonidine.  Denies chest pain or shortness of breath.  No edema.  No history of 

diabetes.





Vital signs are stable.


General: The patient appeared well nourished and normally developed. 


HEENT: Head exam is unremarkable. Neck is without jugular venous distension.


LUNGS: Lungs are clear to auscultation and percussion. Breath sounds decreased.


HEART: Rate and Rhythm are regular. First and second heart sounds normal. No 

murmurs, rubs or gallops. 


ABDOMEN: Abdominal exam reveals normal bowel sounds. Non-tender and non-

distended. No evidence of peritonitis.


EXTREMITITES: No clubbing, cyanosis, or edema.





Past Medical History


Past Medical History: Cancer, Hypertension


Additional Past Medical History / Comment(s): HX SKIN CA, CURRENT LESION ON LEFT

CHEEK, lung CA, CA in the lymph nodes


History of Any Multi-Drug Resistant Organisms: None Reported


Past Surgical History: Back Surgery, Heart Catheterization, Joint Replacement, 

Orthopedic Surgery


Additional Past Surgical History / Comment(s): PREVIOUS SKIN CA LESIONS REMOVED,

R HIP REPLACEMENT, ANKLE ORIF WITH METAL L, lung biopsy, neck biopsy


Past Anesthesia/Blood Transfusion Reactions: Previous Problems w/ Anesthesia


Additional Past Anesthesia/Blood Transfusion Reaction / Comment(s): Pt woke up 

violently, which is out of characteristic for the pt


Past Psychological History: No Psychological Hx Reported


Smoking Status: Never smoker


Past Alcohol Use History: Occasional


Additional Past Alcohol Use History / Comment(s): Patient is a lifelong 

nonsmoker but had extensive exposure to asbestos and acid at [employer] as 

well as plastic exposure and [employer].


Past Drug Use History: None Reported





- Past Family History


  ** Mother


Family Medical History: Cancer, Deep Vein Thrombosis (DVT)


Additional Family Medical History / Comment(s): BREAST CA





Medications and Allergies


                                Home Medications











 Medication  Instructions  Recorded  Confirmed  Type


 


Albuterol Inhaler [Ventolin Hfa 2 puff INHALATION RT-Q4H PRN 10/04/19 10/31/19 

History





Inhaler]    


 


cloNIDine HCL [Catapres] 0.1 mg PO TID PRN 10/30/19 10/31/19 History


 


Acetaminophen [Tylenol Arthritis] 650 mg PO BID PRN 10/31/19 10/31/19 History








                                    Allergies











Allergy/AdvReac Type Severity Reaction Status Date / Time


 


No Known Allergies Allergy   Verified 10/31/19 14:12














Physical Exam


Vitals: 


                                   Vital Signs











  Temp Pulse Pulse Resp BP BP BP


 


 11/01/19 12:00    98  17   


 


 11/01/19 11:00       193/95  178/102


 


 11/01/19 08:20  97.8 F   106 H  18   


 


 11/01/19 03:20  98.7 F   92  16   


 


 11/01/19 01:15       


 


 11/01/19 01:10    99    


 


 11/01/19 00:30    111 H    


 


 11/01/19 00:15    112 H    


 


 11/01/19 00:00    116 H    


 


 10/31/19 23:45    108 H    


 


 10/31/19 23:30  97.6 F   111 H  20   


 


 10/31/19 22:20    87    


 


 10/31/19 21:20  98.4 F   71  16   


 


 10/31/19 16:30  98.0 F  75   18  144/90  


 


 10/31/19 16:00   79   18  140/90  


 


 10/31/19 15:30   75   18  138/84  


 


 10/31/19 15:14   74   20  146/84  


 


 10/31/19 15:00   73   18  145/74  


 


 10/31/19 14:40   72   20  151/79  


 


 10/31/19 14:34   64   20  118/75  


 


 10/31/19 14:30   68   20  90/64  


 


 10/31/19 14:25   65   20  63/52  


 


 10/31/19 14:20   67   20  69/49  


 


 10/31/19 14:15   78   22  84/57  


 


 10/31/19 14:10   66   22  64/49  


 


 10/31/19 14:02  98.0 F  75   22  86/61  














  BP Pulse Ox


 


 11/01/19 12:00  166/91  98


 


 11/01/19 11:00  192/87 


 


 11/01/19 08:20  149/83  97


 


 11/01/19 03:20  146/78  98


 


 11/01/19 01:15   97


 


 11/01/19 01:10  103/69 


 


 11/01/19 00:30  112/70 


 


 11/01/19 00:15  94/56 


 


 11/01/19 00:00  103/65 


 


 10/31/19 23:45  94/57 


 


 10/31/19 23:30  86/51  100


 


 10/31/19 22:20  170/85 


 


 10/31/19 21:20  191/95  98


 


 10/31/19 16:30   100


 


 10/31/19 16:00   100


 


 10/31/19 15:30   100


 


 10/31/19 15:14   100


 


 10/31/19 15:00   100


 


 10/31/19 14:40   99


 


 10/31/19 14:34   100


 


 10/31/19 14:30   100


 


 10/31/19 14:25   99


 


 10/31/19 14:20   100


 


 10/31/19 14:15   99


 


 10/31/19 14:10   97


 


 10/31/19 14:02   99








                                Intake and Output











 10/31/19 11/01/19 11/01/19





 22:59 06:59 14:59


 


Intake Total   600


 


Output Total   300


 


Balance   300


 


Intake:   


 


  Oral   600


 


Output:   


 


  Urine   300


 


Other:   


 


  Voiding Method Toilet Toilet 


 


  # Voids 1 1 


 


  Weight  80.5 kg 














Results





- Lab Results


                             Most recent lab results











Calcium  9.4 mg/dL (8.4-10.2)   10/31/19  14:13    


 


Magnesium  2.1 mg/dL (1.6-2.3)   10/31/19  14:13    














                                 10/31/19 14:13





                                 10/31/19 14:13





Assessment and Plan


Plan: 





Assessment:


1.  Labile hypertension.  Initially there appears to be an component of 

hypovolemia as his blood pressure did improve with normal saline boluses.  

Currently his blood pressure is on the higher side.


2.  History of lung cancer.


3.  Lactic acidosis secondary to hypotension improved with IV hydration.





Plan:


Maintain normal saline at 75 mL an hour for now.


Increase hydralazine to 25 mg 3 times daily.  To be held if standing blood 

pressure less than 130.


Discontinue hydrochlorothiazide.


Will gradually increase dose of hydralazine depending on blood pressure.


Follow-up plasma metanephrines.





Thank you for the consultation.  I will continue to follow the patient with you 

during his hospital stay.

## 2019-11-01 NOTE — P.CONS
History of Present Illness





- Reason for Consult


Consult date: 11/01/19


Metastatic basal cell cancer, syncopal episode





- History of Present Illness





   53-year-old  gentleman, Initially seen in consult on 10/6/19. He has

 a past medical history of hypertension, recurrent basal cell carcinoma of skin,

arthritis GERD and hypertension, and had been admitted with syncope, and a left 

neck mass as well as lung mass.


.


As per the oncology history, the patient has recurrent basal cell carcinoma  of 

the face and neck ,which has been removed in the past. Recently he has developed

swelling in the neck on the left side which has been progressive, was evaluated 

as out patient had a biopsy done on 9/17/19 which was consistent with atypical 

squamous cells with basaloid appearance. CT of the chest done on 9/26/2019 

revealed 2 masses in the right upper lobe and enlarged left supraclavicular/left

lower neck lymph nodes. The mass was 4.6 cm.


Patient underwent bronchoscopy and the pathology revealed squamous cell 

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


The patient had a repeat CT done which did not reveal any pulmonary embolism 

however has shown increase in the size of the mass especially in the pulmonary 

hilum.


 During his recent consultation, the question from the oncology standpoint was 

whether the left neck mass in the lung mass represented 2 separate malignancies 

or the same cancer.  A more definitive biopsy from the left neck mass was 

recommended.


  The patient was subsequently discharged and had a core biopsy from the left 

neck mass on 10/17/19.  This came back positive for basal cell carcinoma.  In 

view of this finding, the lung pathology was reviewed and compared.  The lung 

pathology was not felt also to represent metastatic basal cell carcinoma, s

imilar to the pathology noted in the neck mass.


   The patient had an MRI of the brain on 10/7/19, which was negative for 

metastasis.  He then had a PET scan on10/21/19,  which showed uptake in the left

neck area extending up to the hyoid bone, and down towards the medial upper 

scapula.  Uptake was also noted in the right lung mass.  No other areas of 

uptake were seen.


  The patient was supposed to follow-up in the office yesterday.  Prior to that 

he had an appointment with radiation oncology.  In that office the patient was 

found to be extremely weak and dizzy and was near-syncopal.  He was therefore 

sent in to the emergency room.  He had an episode of nausea and vomiting prior 

to reaching the ER.  He was found to be hypotensive, and was therefore admitted.

 He has subsequently improved with hydration.


 He states that he has had at least 2 similar episodes since his discharge 

earlier in 10/19.  On further questioning he states that these may be 

positional.


  Consult was placed for further evaluation and recommendations





Review of Systems


Constitutional: Reports weakness


Eyes: denies blurred vision, denies pain


Ears: deny: decreased hearing, ear discharge, earache, tinnitus


Ears, nose, mouth and throat: Reports as per HPI, Reports neck 

fullness/pressure, Reports neck lump


Cardiovascular: Reports syncope


Respiratory: Denies cough


Gastrointestinal: Reports nausea, Reports vomiting


Genitourinary: Reports as per HPI


Musculoskeletal: Reports muscle weakness


Integumentary: Reports as per HPI, Reports lesions


Neurological: Reports syncope, Reports weakness


Psychiatric: Denies anxiety, Denies depression


Endocrine: Denies fatigue, Denies weight change


Hematologic/Lymphatic: Reports as per HPI, Reports lymphadenopathy





Past Medical History


Past Medical History: Cancer, Hypertension


Additional Past Medical History / Comment(s): HX SKIN CA, CURRENT LESION ON LEFT

CHEEK, lung CA, CA in the lymph nodes


History of Any Multi-Drug Resistant Organisms: None Reported


Past Surgical History: Back Surgery, Heart Catheterization, Joint Replacement, 

Orthopedic Surgery


Additional Past Surgical History / Comment(s): PREVIOUS SKIN CA LESIONS REMOVED,

R HIP REPLACEMENT, ANKLE ORIF WITH METAL L, lung biopsy, neck biopsy


Past Anesthesia/Blood Transfusion Reactions: Previous Problems w/ Anesthesia


Additional Past Anesthesia/Blood Transfusion Reaction / Comm: Pt woke up 

violently, which is out of characteristic for the pt


Past Psychological History: No Psychological Hx Reported


Smoking Status: Never smoker


Past Alcohol Use History: Occasional


Additional Past Alcohol Use History / Comment(s): Patient is a lifelong nonsmo

ker but had extensive exposure to asbestos and acid at [employer] as well as 

plastic exposure and [employer].


Past Drug Use History: None Reported





- Past Family History


  ** Mother


Family Medical History: Cancer, Deep Vein Thrombosis (DVT)


Additional Family Medical History / Comment(s): BREAST CA





Medications and Allergies


                                Home Medications











 Medication  Instructions  Recorded  Confirmed  Type


 


Albuterol Inhaler [Ventolin Hfa 2 puff INHALATION RT-Q4H PRN 10/04/19 10/31/19 

History





Inhaler]    


 


cloNIDine HCL [Catapres] 0.1 mg PO TID PRN 10/30/19 10/31/19 History


 


Acetaminophen [Tylenol Arthritis] 650 mg PO BID PRN 10/31/19 10/31/19 History








                                    Allergies











Allergy/AdvReac Type Severity Reaction Status Date / Time


 


No Known Allergies Allergy   Verified 10/31/19 14:12














Physical Exam


Vitals: 


                                   Vital Signs











  Temp Pulse Pulse Resp BP BP BP


 


 11/01/19 12:00    98  17   


 


 11/01/19 11:00       193/95  178/102


 


 11/01/19 08:20  97.8 F   106 H  18   


 


 11/01/19 03:20  98.7 F   92  16   


 


 11/01/19 01:15       


 


 11/01/19 01:10    99    


 


 11/01/19 00:30    111 H    


 


 11/01/19 00:15    112 H    


 


 11/01/19 00:00    116 H    


 


 10/31/19 23:45    108 H    


 


 10/31/19 23:30  97.6 F   111 H  20   


 


 10/31/19 22:20    87    


 


 10/31/19 21:20  98.4 F   71  16   


 


 10/31/19 16:30  98.0 F  75   18  144/90  


 


 10/31/19 16:00   79   18  140/90  














  BP Pulse Ox


 


 11/01/19 12:00  166/91  98


 


 11/01/19 11:00  192/87 


 


 11/01/19 08:20  149/83  97


 


 11/01/19 03:20  146/78  98


 


 11/01/19 01:15   97


 


 11/01/19 01:10  103/69 


 


 11/01/19 00:30  112/70 


 


 11/01/19 00:15  94/56 


 


 11/01/19 00:00  103/65 


 


 10/31/19 23:45  94/57 


 


 10/31/19 23:30  86/51  100


 


 10/31/19 22:20  170/85 


 


 10/31/19 21:20  191/95  98


 


 10/31/19 16:30   100


 


 10/31/19 16:00   100








                                Intake and Output











 11/01/19 11/01/19 11/01/19





 06:59 14:59 22:59


 


Intake Total  1200 


 


Output Total  300 


 


Balance  900 


 


Intake:   


 


  Oral  1200 


 


Output:   


 


  Urine  300 


 


Other:   


 


  Voiding Method Toilet  


 


  # Voids 1  


 


  Weight 80.5 kg 80.5 kg 














- Constitutional


General appearance: no acute distress





- EENT


Eyes: EOMI, PERRLA


ENT: hearing grossly normal, normal oropharynx





- Neck


Neck: lymphadenopathy (Irregular, hard fixed mass at left neck base extending 

upward into the thyroid cartilage and inferiorly and posteriorly to the left 

medial upper scapula.  Appears to consist of matted lymph nodes.  Superior and 

anterior to the same is a superficial ulcerated skin lesion)


Thyroid: bilateral: normal size





- Respiratory


Respiratory: bilateral: CTA





- Cardiovascular


Rhythm: regular


Heart sounds: normal: S1, S2





- Gastrointestinal


General gastrointestinal: normal bowel sounds, soft





- Integumentary


Integumentary: normal





- Neurologic


Neurologic: CNII-XII intact





- Musculoskeletal


Musculoskeletal: generalized weakness, strength equal bilaterally





- Psychiatric


Psychiatric: A&O x's 3, appropriate affect





Results


CBC & Chem 7: 


                                 10/31/19 14:13





                                 10/31/19 14:13


Comments: 





 PET scan report reviewed


Pathology report reviewed


Chest x-ray: report reviewed





Assessment and Plan


(1) Syncope


Narrative/Plan: 


The patient has been having recurrent episodes, which have become worse in terms

of frequency since his admission earlier this month.  These are accompanied with

low blood pressure, dizziness, near-syncope or syncope, as well as nausea with 

occasional vomiting.  based on the the clinical picture this most likely is due 

to a mass effect of the left neck mass, on adjacent vascular and nerve 

structures, likely the vagus nerve causing a vasovagal response.  The mass has 

enlarged rapidly in size since our initial consult 3 weeks ago.


 The case was discussed with radiation oncology.  The patient will be started on

steroids to reduce inflammation and pressure affect.  The plan is to start him 

on palliative radiation in the near future.


Current Visit: No   Status: Acute   Code(s): R55 - SYNCOPE AND COLLAPSE   SNOMED

Code(s): 500094052


   





(2) Basal cell carcinoma


Narrative/Plan: 


 The patient has an unusual presentation of aggressive basal cell carcinoma of 

the skin.  He the primary had initially occurred in the left neck area, and now 

appears to have developed metastasis to the left neck lymph nodes and to the 

lungs.


 Based on the extent of disease, and to discuss with the patient that he is 

likely not curable.  The objective of treatment would be prolongation of life 

and palliation of symptoms.  Fortunately he does not have a heavy burden of 

disease at this time, but rate of progression of the left neck mass is quite 

impressive


- Plan for palliative radiation, and steroids for the left neck lesion as noted 

above.  After completion of the same, the patient will be started on systemic 

therapy.  The case has been discussed in the [facility].  The plan at this time would 

be to utilize targeted therapy after radiation.  If the patient does have a good

response to the same, he may be a candidate for additional, more aggressive 

localized therapy.


Current Visit: Yes   Status: Acute   Code(s): C44.91 - BASAL CELL CARCINOMA OF 

SKIN, UNSPECIFIED   SNOMED Code(s): 454795499

## 2019-11-02 RX ADMIN — FAMOTIDINE SCH MG: 20 TABLET, FILM COATED ORAL at 09:35

## 2019-11-02 RX ADMIN — DEXAMETHASONE SCH MG: 4 TABLET ORAL at 21:29

## 2019-11-02 RX ADMIN — HEPARIN SODIUM SCH UNIT: 5000 INJECTION, SOLUTION INTRAVENOUS; SUBCUTANEOUS at 09:35

## 2019-11-02 RX ADMIN — HEPARIN SODIUM SCH UNIT: 5000 INJECTION, SOLUTION INTRAVENOUS; SUBCUTANEOUS at 21:29

## 2019-11-02 RX ADMIN — CEFAZOLIN SCH: 330 INJECTION, POWDER, FOR SOLUTION INTRAMUSCULAR; INTRAVENOUS at 09:46

## 2019-11-02 RX ADMIN — DEXAMETHASONE SCH MG: 4 TABLET ORAL at 15:53

## 2019-11-02 RX ADMIN — DEXAMETHASONE SCH MG: 4 TABLET ORAL at 09:34
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
## 2019-11-02 NOTE — P.CRDCN
History of Present Illness


Consult date: 11/02/19


Reason for Consult (text): 





Autonomic hypotension


History of present illness: 





This is a 69-year-old  male patient of Dr. Mata and Dr. Bundy.

 He does not follow with a cardiologist.  He has a past medical history 

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 

metastatic disease to the neck and lungs with plan for palliative radiation 

therapy.  Patient had a recent hospitalization on October 5 at which time he was

treated for dizziness syncopal episode hypotension secondary to lisinopril and h

ydrochlorothiazide and hydrochlorothiazide was discontinued at the time of 

discharge.  





He gives history that he was at [facility] as he was getting fitted for his mass 

to start his radiation therapy and he had a drop in his blood pressure with 

lightheadedness and was brought over to [facility] emergency 

center for evaluation.  His initial vital signs were blood pressure 86/61.  He 

was placed on IV fluids and subsequently blood pressure ze for which 

hydralazine 1 dose IV was given and subsequent recheck was higher and he was 

given hydralazine orally.  He has had multiple changes to his blood pressure 

medications at home.  Patient has had 3-4 episodes of near syncope episodes with

hypotension since October 1.  Patient's wife states the last one was on Friday 

when he was at Dr. Mata's office.  He had high blood pressure and he was 

given medications which subsequently dropped his blood pressure he went home and

went to bed.  Patient states that he has been drinking lots of fluids.  

Orthostatics are negative.  Blood pressure remains high and medication 

adjustments have been made.  Patient denies having any chest pain, no fever or 

chills.  He does complain of a burning type pain in the left shoulder. The 

patient has had another episode while hospitalized on the evening of October 31.

 Rhythm strips were reviewed and there was no arrhythmia noted.








Review of Systems


Constitutional: Reports fatigue, Reports poor appetite, Reports weakness, Denies

chills, Denies fever, Denies malaise


Eyes: denies blurred vision, denies pain


Ears, nose, mouth and throat: Reports vertigo, Denies dysphagia


Cardiovascular: Reports lightheadedness, Reports syncope, Denies chest pain, 

Denies decreased exercise tolerance, Denies dyspnea on exertion, Denies leg ed

ema, Denies orthopnea, Denies palpitations


Respiratory: Denies cough, Denies cough with sputum, Denies dyspnea, Denies 

excessive sputum, Denies hemoptysis, Denies home oxygen, Denies wheezing


Gastrointestinal: Reports diarrhea, Reports loss of appetite, Reports nausea, 

Denies abdominal pain, Denies vomiting


Genitourinary: Denies dysuria, Denies urinary frequency, Denies urinary 

retention


Integumentary: Denies pruritus, Denies rash, Denies wounds


Neurological: Reports syncope, Reports vertigo, Denies gait dysfunction, Denies 

numbness, Denies seizures, Denies weakness


Psychiatric: Denies anxiety, Denies depression


Endocrine: Denies fatigue, Denies weight change











Gen: This is 69-year-old maler patient.  Patient is resting in his room and 

appears to be comfortable and in no acute distress.  Patient's wife is at beds

[place].


HEENT: Head is atraumatic, normocephalic. Pupils equal, round. Sclerae is 

anicteric.  Mass left lateral neck area. 


NECK: Supple. No JVD. No lymphadenopathy. No thyromegaly. 


LUNGS: Clear to auscultation. No wheezes or rhonchi.  No intercostal 

retractions.


HEART: Regular rate and rhythm. No murmur. 


ABDOMEN: Soft. Bowel sounds are present. No masses.  No tenderness.


EXTREMITIES: No pedal edema.  No calf tenderness.


NEUROLOGICAL: Patient is awake, alert and oriented x3. Cranial nerves 2 through 

12 are grossly intact.  Patient did not feel lightheaded or dizziness during 

orthostatic vital signs.








 





Assessment:


Dizziness with syncopal, presyncopal episodes and labile blood pressure readings


Basal cell skin cancer with metastatic disease to the neck and lungs


Lactic acidosis secondary to hypoperfusion and underlying cancer


Hypertension








Plan:


Review of cardiac monitor strips revealed no arrhythmias


Recommend CBC and BMP tomorrow


Continue telemetry monitoring


Continue hydralazine


Most likely patient will be cleared for discharge tomorrow


Further recommendations to follow based upon clinical course


Thank you kindly for this consultation





Nurse practitioner note has been reviewed, I agree with documented findings and 

plan of care.  Patient was seen and examined.





Past Medical History


Past Medical History: Cancer, Hypertension


Additional Past Medical History / Comment(s): HX SKIN CA, CURRENT LESION ON LEFT

CHEEK, lung CA, CA in the lymph nodes


History of Any Multi-Drug Resistant Organisms: None Reported


Past Surgical History: Back Surgery, Heart Catheterization, Joint Replacement, 

Orthopedic Surgery


Additional Past Surgical History / Comment(s): PREVIOUS SKIN CA LESIONS REMOVED,

R HIP REPLACEMENT, ANKLE ORIF WITH METAL L, lung biopsy, neck biopsy


Past Anesthesia/Blood Transfusion Reactions: Previous Problems w/ Anesthesia


Additional Past Anesthesia/Blood Transfusion Reaction / Comment(s): Pt woke up 

violently, which is out of characteristic for the pt


Past Psychological History: No Psychological Hx Reported


Smoking Status: Never smoker


Past Alcohol Use History: Occasional


Additional Past Alcohol Use History / Comment(s): Patient is a lifelong 

nonsmoker but had extensive exposure to asbestos and acid at [employer] as 

well as plastic exposure and [employer].


Past Drug Use History: None Reported





- Past Family History


  ** Mother


Family Medical History: Cancer, Deep Vein Thrombosis (DVT)


Additional Family Medical History / Comment(s): BREAST CA





Medications and Allergies


                                Home Medications











 Medication  Instructions  Recorded  Confirmed  Type


 


Albuterol Inhaler [Ventolin Hfa 2 puff INHALATION RT-Q4H PRN 10/04/19 10/31/19 

History





Inhaler]    


 


cloNIDine HCL [Catapres] 0.1 mg PO TID PRN 10/30/19 10/31/19 History


 


Acetaminophen [Tylenol Arthritis] 650 mg PO BID PRN 10/31/19 10/31/19 History








                                    Allergies











Allergy/AdvReac Type Severity Reaction Status Date / Time


 


No Known Allergies Allergy   Verified 10/31/19 14:12














Physical Exam


Vitals: 


                                   Vital Signs











  Temp Pulse Resp BP BP BP Pulse Ox


 


 11/02/19 03:45  98.1 F  106 H  18   108/77   97


 


 11/01/19 23:20  98.1 F  100  18   175/102   96


 


 11/01/19 20:00  97.4 F L  101 H  16   135/84   97


 


 11/01/19 16:15   79  17   176/104   99


 


 11/01/19 12:00   98  17    166/91  98


 


 11/01/19 11:00     193/95  178/102  192/87 








                                Intake and Output











 11/01/19 11/02/19 11/02/19





 22:59 06:59 14:59


 


Intake Total 240  


 


Output Total 675 1300 


 


Balance -435 -1300 


 


Intake:   


 


  Oral 240  


 


Output:   


 


  Urine 675 1300 


 


Other:   


 


  Voiding Method Urinal Urinal 


 


  # Voids  1 


 


  Weight  80.6 kg 














Results





                                 10/31/19 14:13





                                 10/31/19 14:13


                               Current Medications











Generic Name Dose Route Start Last Admin





  Trade Name Freq  PRN Reason Stop Dose Admin


 


Acetaminophen  650 mg  10/31/19 16:02  11/01/19 12:19





  Tylenol Tab  PO   650 mg





  Q6HR PRN   Administration





  Mild Pain or Fever > 100.5  


 


Albuterol Sulfate  2.5 mg  10/31/19 16:04 





  Ventolin Nebulized  INHALATION  





  RT-Q4H PRN  





  Shortness Of Breath  


 


Dexamethasone  4 mg  11/01/19 22:00  11/02/19 09:34





  Hexadrol  PO   4 mg





  TID TK   Administration


 


Famotidine  20 mg  11/02/19 09:00  11/02/19 09:35





  Pepcid  PO   20 mg





  DAILY TK   Administration


 


Heparin Sodium (Porcine)  5,000 unit  11/01/19 21:00  11/02/19 09:35





  Heparin  SQ   5,000 unit





  Q12HR TK   Administration


 


Hydralazine HCl  10 mg  10/31/19 18:58  10/31/19 21:28





  Apresoline  IVP   10 mg





  Q6HR PRN   Administration





  Blood Pressure - High  


 


Hydralazine HCl  25 mg  11/01/19 16:00  11/02/19 09:39





  Apresoline  PO   Not Given





  TID [facility]  


 


Sodium Chloride  1,000 mls @ 75 mls/hr  11/01/19 11:00  11/02/19 09:46





  Saline 0.9%  IV   Not Given





  .[id number] [facility]  


 


Ketorolac Tromethamine  15 mg  11/01/19 11:11 





  Toradol  IVP  11/05/19 11:11 





  Q6HR PRN  





  Breakthrough Pain  


 


Naloxone HCl  0.2 mg  10/31/19 16:02 





  Narcan  IV  





  Q2M PRN  





  Opioid Reversal  


 


Ondansetron HCl  4 mg  10/31/19 22:23  10/31/19 23:46





  Zofran  IVP   4 mg





  Q6HR PRN   Administration





  Nausea And Vomiting  








                                Intake and Output











 11/01/19 11/02/19 11/02/19





 22:59 06:59 14:59


 


Intake Total 240  


 


Output Total 675 1300 


 


Balance -435 -1300 


 


Intake:   


 


  Oral 240  


 


Output:   


 


  Urine 675 1300 


 


Other:   


 


  Voiding Method Urinal Urinal 


 


  # Voids  1 


 


  Weight  80.6 kg 








                                        





                                 10/31/19 14:13 





                                 10/31/19 14:13

## 2019-11-02 NOTE — P.PN
Subjective


Progress Note Date: 11/02/19


Seen and examined for the follow-up of labile hypertension.  Feels better today.

 No dizziness lightheadedness.  No nausea vomiting or diarrhea.








Objective





- Vital Signs


Vital signs: 


                                   Vital Signs











Temp  98.1 F   11/02/19 03:45


 


Pulse  106 H  11/02/19 03:45


 


Resp  18   11/02/19 03:45


 


BP  108/77   11/02/19 03:45


 


Pulse Ox  97   11/02/19 03:45








                                 Intake & Output











 11/01/19 11/02/19 11/02/19





 18:59 06:59 18:59


 


Intake Total 1440  


 


Output Total 975 1300 


 


Balance 465 -1300 


 


Weight 80.5 kg 80.6 kg 


 


Intake:   


 


  Oral 1440  


 


Output:   


 


  Urine 975 1300 


 


Other:   


 


  Voiding Method  Urinal 


 


  # Voids 2 1 














- Exam


No acute distress


S1-S2 heard


Lungs clear


Abdomen soft


No edema


Left neck invasive basal cancer








- Labs


CBC & Chem 7: 


                                 10/31/19 14:13





                                 10/31/19 14:13


Labs: 


                      Microbiology - Last 24 Hours (Table)











 10/31/19 14:31 Blood Culture - Preliminary





 Blood    No Growth after 24 hours














Assessment and Plan


Assessment: 


#1 labile hypertension with orthostatic hypotension suspect secondary to volume 

depletion and vagal stimulation from basal cell cancer of the neck.


#2 basal cell cancer metastatic


#3 lactic acidosis improved with hydration.





Plan: 


#1 no new labs today.


#2 blood pressures stable, continue with head and elevated to 30 while in bed.


#3 if systolic blood pressure drops while standing use MELY stockings while out 

of bed.  Remove it while lying in bed.


#4 treat standing blood pressure only when greater than 140 systolic.


#5 continue with hydralazine at this time.


#6 check labs in the morning

## 2019-11-03 VITALS — SYSTOLIC BLOOD PRESSURE: 136 MMHG | DIASTOLIC BLOOD PRESSURE: 75 MMHG | TEMPERATURE: 97.6 F | HEART RATE: 116 BPM

## 2019-11-03 VITALS — RESPIRATION RATE: 18 BRPM

## 2019-11-03 LAB
ANION GAP SERPL CALC-SCNC: 10 MMOL/L
BUN SERPL-SCNC: 11 MG/DL (ref 9–20)
CALCIUM SPEC-MCNC: 9.5 MG/DL (ref 8.4–10.2)
CHLORIDE SERPL-SCNC: 101 MMOL/L (ref 98–107)
CO2 SERPL-SCNC: 27 MMOL/L (ref 22–30)
ERYTHROCYTE [DISTWIDTH] IN BLOOD BY AUTOMATED COUNT: 4.65 M/UL (ref 4.3–5.9)
ERYTHROCYTE [DISTWIDTH] IN BLOOD: 16 % (ref 11.5–15.5)
GLUCOSE SERPL-MCNC: 140 MG/DL (ref 74–99)
HCT VFR BLD AUTO: 34.6 % (ref 39–53)
HGB BLD-MCNC: 10.8 GM/DL (ref 13–17.5)
MCH RBC QN AUTO: 23.2 PG (ref 25–35)
MCHC RBC AUTO-ENTMCNC: 31.2 G/DL (ref 31–37)
MCV RBC AUTO: 74.4 FL (ref 80–100)
PLATELET # BLD AUTO: 339 K/UL (ref 150–450)
POTASSIUM SERPL-SCNC: 4.2 MMOL/L (ref 3.5–5.1)
SODIUM SERPL-SCNC: 138 MMOL/L (ref 137–145)
WBC # BLD AUTO: 5.6 K/UL (ref 3.8–10.6)

## 2019-11-03 RX ADMIN — DEXAMETHASONE SCH MG: 4 TABLET ORAL at 08:40

## 2019-11-03 RX ADMIN — HEPARIN SODIUM SCH UNIT: 5000 INJECTION, SOLUTION INTRAVENOUS; SUBCUTANEOUS at 08:40

## 2019-11-03 RX ADMIN — CEFAZOLIN SCH: 330 INJECTION, POWDER, FOR SOLUTION INTRAMUSCULAR; INTRAVENOUS at 07:55

## 2019-11-03 RX ADMIN — FAMOTIDINE SCH MG: 20 TABLET, FILM COATED ORAL at 08:41

## 2019-11-03 NOTE — P.PN
Subjective


Progress Note Date: 11/03/19


Seen and examined for the follow-up of labile hypertension.  Feels better today.

 No dizziness lightheadedness.  No nausea vomiting or diarrhea.








Objective





- Vital Signs


Vital signs: 


                                   Vital Signs











Temp  97.0 F L  11/03/19 08:40


 


Pulse  125 H  11/03/19 08:40


 


Resp  18   11/03/19 08:40


 


BP  128/52   11/03/19 08:40


 


Pulse Ox  99   11/03/19 08:40








                                 Intake & Output











 11/02/19 11/03/19 11/03/19





 19:59 06:59 18:59


 


Intake Total   600


 


Output Total   


 


Balance   600


 


Weight   


 


Intake:   


 


  Oral   600


 


Output:   


 


  Urine   


 


Other:   


 


  Voiding Method   


 


  # Voids   














- Exam


No acute distress


S1-S2 heard


Lungs clear


Abdomen soft


No edema


Left neck invasive basal cancer








- Labs


CBC & Chem 7: 


                                 11/03/19 05:53





                                 11/03/19 05:53


Labs: 


                  Abnormal Lab Results - Last 24 Hours (Table)











  11/03/19 11/03/19 Range/Units





  05:53 05:53 


 


Hgb  10.8 L   (13.0-17.5)  gm/dL


 


Hct  34.6 L   (39.0-53.0)  %


 


MCV  74.4 L   (80.0-100.0)  fL


 


MCH  23.2 L   (25.0-35.0)  pg


 


RDW  16.0 H   (11.5-15.5)  %


 


Glucose   140 H  (74-99)  mg/dL








                      Microbiology - Last 24 Hours (Table)











 10/31/19 14:31 Blood Culture - Preliminary





 Blood    No Growth after 48 hours














Assessment and Plan


Assessment: 


#1 labile hypertension with orthostatic hypotension suspect secondary to volume 

depletion and vagal stimulation from basal cell cancer of the neck.


#2 basal cell cancer metastatic


#3 lactic acidosis improved with hydration.





Plan: 


#1 blood pressures stable, continue with head and elevated to 30 while in bed.


#2 if systolic blood pressure drops while standing use MELY stockings while out 

of bed.  Remove it while lying in bed.


#3 treat standing blood pressure only when greater than 140 systolic.


#4 continue with hydralazine at this time.  Stop IV hydralazine as it can drop 

in blood pressure very rapidly.

## 2019-11-03 NOTE — P.PN
Subjective


Progress Note Date: 11/03/19





This is a 69-year-old  male patient of Dr. Mata and Dr. Bundy.

 He does not follow with a cardiologist.  He has a past medical history 

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 

metastatic disease to the neck and lungs with plan for palliative radiation t

herapy.  Patient had a recent hospitalization on October 5 at which time he was 

treated for dizziness syncopal episode hypotension secondary to lisinopril and 

hydrochlorothiazide and hydrochlorothiazide was discontinued at the time of 

discharge.  





He gives history that he was at [facility] as he was getting fitted for his mass 

to start his radiation therapy and he had a drop in his blood pressure with 

lightheadedness and was brought over to [facility] emergency 

center for evaluation.  His initial vital signs were blood pressure 86/61.  He 

was placed on IV fluids and subsequently blood pressure ze for which 

hydralazine 1 dose IV was given and subsequent recheck was higher and he was 

given hydralazine orally.  He has had multiple changes to his blood pressure 

medications at home.  Patient has had 3-4 episodes of near syncope episodes with

hypotension since October 1.  Patient's wife states the last one was on Friday 

when he was at Dr. Mata's office.  He had high blood pressure and he was 

given medications which subsequently dropped his blood pressure he went home and

went to bed.  Patient states that he has been drinking lots of fluids.  

Orthostatics are negative.  Blood pressure remains high and medication 

adjustments have been made.  Patient denies having any chest pain, no fever or 

chills.  He does complain of a burning type pain in the left shoulder. The 

patient has had another episode while hospitalized on the evening of October 31.

 Rhythm strips were reviewed and there was no arrhythmia noted.





11/3: Patient continues to have labile blood pressures and this morning was 

136/75.  Plan is to continue current medications as patient will most likely 

continue to have labile blood pressure readings until the left neck mass has 

shrunk by radiation therapy.  Patient denies any new complaints.








Gen: This is 69-year-old maler patient.  Patient is resting in his room and 

appears to be comfortable and in no acute distress.  Patient's wife is at beds

[place].


Afebrile, heart rate 116, blood pressure 136/75 and pulse ox 98% on room air


HEENT: Head is atraumatic, normocephalic. Pupils equal, round. Sclerae is 

anicteric.  Mass left lateral neck area. 


NECK: Supple. No JVD. No lymphadenopathy. No thyromegaly. 


LUNGS: Clear to auscultation. No wheezes or rhonchi.  No intercostal 

retractions.


HEART: Regular rate and rhythm. No murmur. 


ABDOMEN: Soft. Bowel sounds are present. No masses.  No tenderness.


EXTREMITIES: No pedal edema.  No calf tenderness.


NEUROLOGICAL: Patient is awake, alert and oriented x3. Cranial nerves 2 through 

12 are grossly intact.  Patient did not feel lightheaded or dizziness during 

orthostatic vital signs.








 





Assessment:


Dizziness with syncopal, presyncopal episodes and labile blood pressure readings

most likely is due to a mass effect of the left neck mass, on adjacent vascular 

and nerve structures, likely the vagus nerve causing a vasovagal response.  The 

mass has enlarged rapidly in size since our initial consult 3 weeks ago.


Basal cell skin cancer with metastatic disease to the neck and lungs


Lactic acidosis secondary to hypoperfusion and underlying cancer


Hypertension








Plan:


Review of cardiac monitor strips revealed no arrhythmias


Continue telemetry monitoring


Continue hydralazine


Patient is cleared for discharge from cardiology to continue current medication

s.


Further recommendations to follow based upon clinical course


Thank you kindly for this consultation





Nurse practitioner note has been reviewed, I agree with documented findings and 

plan of care.  Patient was seen and exa





Objective





- Vital Signs


Vital signs: 


                                   Vital Signs











Temp  97.6 F   11/03/19 04:00


 


Pulse  110 H  11/03/19 04:00


 


Resp  16   11/03/19 04:00


 


BP  167/97   11/03/19 04:00


 


Pulse Ox  95   11/03/19 04:00








                                 Intake & Output











 11/02/19 11/03/19 11/03/19





 19:59 06:59 18:59


 


Intake Total   


 


Output Total   


 


Balance   


 


Weight   


 


Intake:   


 


  Oral   


 


Output:   


 


  Urine   


 


Other:   


 


  Voiding Method   


 


  # Voids   














- Labs


CBC & Chem 7: 


                                 11/03/19 05:53





                                 11/03/19 05:53


Labs: 


                  Abnormal Lab Results - Last 24 Hours (Table)











  11/03/19 11/03/19 Range/Units





  05:53 05:53 


 


Hgb  10.8 L   (13.0-17.5)  gm/dL


 


Hct  34.6 L   (39.0-53.0)  %


 


MCV  74.4 L   (80.0-100.0)  fL


 


MCH  23.2 L   (25.0-35.0)  pg


 


RDW  16.0 H   (11.5-15.5)  %


 


Glucose   140 H  (74-99)  mg/dL








                      Microbiology - Last 24 Hours (Table)











 10/31/19 14:31 Blood Culture - Preliminary





 Blood    No Growth after 48 hours
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
## 2019-11-09 LAB
Lab: <0.3 NMOL/L
METANEPH FREE SERPL-MCNC: (no result) PG/ML
METANEPHS SERPL-MCNC: (no result) PG/ML
NORMETANEPH FREE SERPL-MCNC: (no result) PG/ML

## 2019-11-22 ENCOUNTER — HOSPITAL ENCOUNTER (INPATIENT)
Dept: HOSPITAL 47 - EC | Age: 69
LOS: 5 days | Discharge: HOME HEALTH | DRG: 871 | End: 2019-11-27
Attending: FAMILY MEDICINE | Admitting: FAMILY MEDICINE
Payer: MEDICARE

## 2019-11-22 VITALS — BODY MASS INDEX: 26.9 KG/M2

## 2019-11-22 DIAGNOSIS — L02.11: ICD-10-CM

## 2019-11-22 DIAGNOSIS — C78.01: ICD-10-CM

## 2019-11-22 DIAGNOSIS — G93.41: ICD-10-CM

## 2019-11-22 DIAGNOSIS — C44.41: ICD-10-CM

## 2019-11-22 DIAGNOSIS — N17.9: ICD-10-CM

## 2019-11-22 DIAGNOSIS — A41.9: Primary | ICD-10-CM

## 2019-11-22 DIAGNOSIS — G47.00: ICD-10-CM

## 2019-11-22 DIAGNOSIS — I12.9: ICD-10-CM

## 2019-11-22 DIAGNOSIS — R13.19: ICD-10-CM

## 2019-11-22 DIAGNOSIS — Z80.3: ICD-10-CM

## 2019-11-22 DIAGNOSIS — L03.221: ICD-10-CM

## 2019-11-22 DIAGNOSIS — G90.9: ICD-10-CM

## 2019-11-22 DIAGNOSIS — Z79.899: ICD-10-CM

## 2019-11-22 DIAGNOSIS — C78.02: ICD-10-CM

## 2019-11-22 DIAGNOSIS — C77.0: ICD-10-CM

## 2019-11-22 DIAGNOSIS — D53.9: ICD-10-CM

## 2019-11-22 DIAGNOSIS — J04.0: ICD-10-CM
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
## 2019-11-22 DIAGNOSIS — N18.2: ICD-10-CM

## 2019-11-22 DIAGNOSIS — Z96.641: ICD-10-CM

## 2019-11-22 DIAGNOSIS — F41.1: ICD-10-CM

## 2019-11-22 DIAGNOSIS — Z92.3: ICD-10-CM

## 2019-11-22 DIAGNOSIS — Z77.090: ICD-10-CM

## 2019-11-22 DIAGNOSIS — E86.0: ICD-10-CM

## 2019-11-22 LAB
ALBUMIN SERPL-MCNC: 4.1 G/DL (ref 3.5–5)
ALP SERPL-CCNC: 74 U/L (ref 38–126)
ALT SERPL-CCNC: 39 U/L (ref 21–72)
ANION GAP SERPL CALC-SCNC: 13 MMOL/L
APTT BLD: 25.7 SEC (ref 22–30)
AST SERPL-CCNC: 35 U/L (ref 17–59)
BASOPHILS # BLD AUTO: 0 K/UL (ref 0–0.2)
BASOPHILS NFR BLD AUTO: 0 %
BUN SERPL-SCNC: 10 MG/DL (ref 9–20)
CALCIUM SPEC-MCNC: 9.4 MG/DL (ref 8.4–10.2)
CHLORIDE SERPL-SCNC: 99 MMOL/L (ref 98–107)
CO2 SERPL-SCNC: 24 MMOL/L (ref 22–30)
EOSINOPHIL # BLD AUTO: 0 K/UL (ref 0–0.7)
EOSINOPHIL NFR BLD AUTO: 0 %
ERYTHROCYTE [DISTWIDTH] IN BLOOD BY AUTOMATED COUNT: 4.71 M/UL (ref 4.3–5.9)
ERYTHROCYTE [DISTWIDTH] IN BLOOD: 16.3 % (ref 11.5–15.5)
GLUCOSE SERPL-MCNC: 131 MG/DL (ref 74–99)
HCT VFR BLD AUTO: 34.8 % (ref 39–53)
HGB BLD-MCNC: 10.8 GM/DL (ref 13–17.5)
HYALINE CASTS UR QL AUTO: 8 /LPF (ref 0–2)
INR PPP: 1 (ref ?–1.2)
KETONES UR QL STRIP.AUTO: (no result)
LYMPHOCYTES # SPEC AUTO: 0.3 K/UL (ref 1–4.8)
LYMPHOCYTES NFR SPEC AUTO: 2 %
MAGNESIUM SPEC-SCNC: 1.8 MG/DL (ref 1.6–2.3)
MCH RBC QN AUTO: 23 PG (ref 25–35)
MCHC RBC AUTO-ENTMCNC: 31.2 G/DL (ref 31–37)
MCV RBC AUTO: 73.9 FL (ref 80–100)
MONOCYTES # BLD AUTO: 0.5 K/UL (ref 0–1)
MONOCYTES NFR BLD AUTO: 3 %
NEUTROPHILS # BLD AUTO: 12.7 K/UL (ref 1.3–7.7)
NEUTROPHILS NFR BLD AUTO: 93 %
PH UR: 6 [PH] (ref 5–8)
PLATELET # BLD AUTO: 304 K/UL (ref 150–450)
POTASSIUM SERPL-SCNC: 3.6 MMOL/L (ref 3.5–5.1)
PROT SERPL-MCNC: 8.2 G/DL (ref 6.3–8.2)
PROT UR QL: (no result)
PT BLD: 11 SEC (ref 9–12)
RBC UR QL: >182 /HPF (ref 0–5)
SODIUM SERPL-SCNC: 136 MMOL/L (ref 137–145)
SP GR UR: 1.02 (ref 1–1.03)
SQUAMOUS UR QL AUTO: <1 /HPF (ref 0–4)
UROBILINOGEN UR QL STRIP: 3 MG/DL (ref ?–2)
WBC # BLD AUTO: 13.6 K/UL (ref 3.8–10.6)

## 2019-11-22 PROCEDURE — 96361 HYDRATE IV INFUSION ADD-ON: CPT

## 2019-11-22 PROCEDURE — 99285 EMERGENCY DEPT VISIT HI MDM: CPT

## 2019-11-22 PROCEDURE — 88173 CYTOPATH EVAL FNA REPORT: CPT

## 2019-11-22 PROCEDURE — 74230 X-RAY XM SWLNG FUNCJ C+: CPT

## 2019-11-22 PROCEDURE — 96365 THER/PROPH/DIAG IV INF INIT: CPT

## 2019-11-22 PROCEDURE — 87040 BLOOD CULTURE FOR BACTERIA: CPT

## 2019-11-22 PROCEDURE — 83880 ASSAY OF NATRIURETIC PEPTIDE: CPT

## 2019-11-22 PROCEDURE — 83735 ASSAY OF MAGNESIUM: CPT

## 2019-11-22 PROCEDURE — 10160 PNXR ASPIR ABSC HMTMA BULLA: CPT

## 2019-11-22 PROCEDURE — 76937 US GUIDE VASCULAR ACCESS: CPT

## 2019-11-22 PROCEDURE — 80053 COMPREHEN METABOLIC PANEL: CPT

## 2019-11-22 PROCEDURE — 87186 SC STD MICRODIL/AGAR DIL: CPT

## 2019-11-22 PROCEDURE — 71046 X-RAY EXAM CHEST 2 VIEWS: CPT

## 2019-11-22 PROCEDURE — 87102 FUNGUS ISOLATION CULTURE: CPT

## 2019-11-22 PROCEDURE — 84100 ASSAY OF PHOSPHORUS: CPT

## 2019-11-22 PROCEDURE — 87502 INFLUENZA DNA AMP PROBE: CPT

## 2019-11-22 PROCEDURE — 85610 PROTHROMBIN TIME: CPT

## 2019-11-22 PROCEDURE — 84443 ASSAY THYROID STIM HORMONE: CPT

## 2019-11-22 PROCEDURE — 93005 ELECTROCARDIOGRAM TRACING: CPT

## 2019-11-22 PROCEDURE — 70491 CT SOFT TISSUE NECK W/DYE: CPT

## 2019-11-22 PROCEDURE — 87077 CULTURE AEROBIC IDENTIFY: CPT

## 2019-11-22 PROCEDURE — 87086 URINE CULTURE/COLONY COUNT: CPT

## 2019-11-22 PROCEDURE — 87070 CULTURE OTHR SPECIMN AEROBIC: CPT

## 2019-11-22 PROCEDURE — 84132 ASSAY OF SERUM POTASSIUM: CPT

## 2019-11-22 PROCEDURE — 70450 CT HEAD/BRAIN W/O DYE: CPT

## 2019-11-22 PROCEDURE — 71260 CT THORAX DX C+: CPT

## 2019-11-22 PROCEDURE — 36410 VNPNXR 3YR/> PHY/QHP DX/THER: CPT

## 2019-11-22 PROCEDURE — 87075 CULTR BACTERIA EXCEPT BLOOD: CPT

## 2019-11-22 PROCEDURE — 81001 URINALYSIS AUTO W/SCOPE: CPT

## 2019-11-22 PROCEDURE — 96367 TX/PROPH/DG ADDL SEQ IV INF: CPT

## 2019-11-22 PROCEDURE — 85730 THROMBOPLASTIN TIME PARTIAL: CPT

## 2019-11-22 PROCEDURE — 87205 SMEAR GRAM STAIN: CPT

## 2019-11-22 PROCEDURE — 83605 ASSAY OF LACTIC ACID: CPT

## 2019-11-22 PROCEDURE — 85025 COMPLETE CBC W/AUTO DIFF WBC: CPT

## 2019-11-22 PROCEDURE — 36415 COLL VENOUS BLD VENIPUNCTURE: CPT

## 2019-11-22 PROCEDURE — 84484 ASSAY OF TROPONIN QUANT: CPT

## 2019-11-22 PROCEDURE — 80202 ASSAY OF VANCOMYCIN: CPT

## 2019-11-22 PROCEDURE — 82330 ASSAY OF CALCIUM: CPT

## 2019-11-22 PROCEDURE — 80048 BASIC METABOLIC PNL TOTAL CA: CPT

## 2019-11-22 RX ADMIN — SODIUM CHLORIDE SCH MLS/HR: 9 INJECTION, SOLUTION INTRAVENOUS at 23:12

## 2019-11-22 RX ADMIN — METOPROLOL TARTRATE SCH MG: 25 TABLET, FILM COATED ORAL at 20:27

## 2019-11-22 RX ADMIN — ACETAMINOPHEN PRN MG: 325 TABLET, FILM COATED ORAL at 18:18

## 2019-11-22 RX ADMIN — Medication SCH MG: at 23:12

## 2019-11-22 RX ADMIN — CEFAZOLIN SCH MLS/HR: 330 INJECTION, POWDER, FOR SOLUTION INTRAMUSCULAR; INTRAVENOUS at 23:12

## 2019-11-22 NOTE — XR
EXAMINATION TYPE: XR chest 2V

 

DATE OF EXAM: 11/22/2019

 

COMPARISON: Prior chest x-ray 10/31/2019

 

HISTORY: Weakness

 

TECHNIQUE:  Frontal and lateral views of the chest are obtained.

 

FINDINGS:  Bandlike area of increased attenuation in the right lobe may reflect scarring, tumor simil
ar to prior exam. Patient is rotated, there may be spinal curvature. Cardiac mediastinal silhouette, 
pulmonary vascularity and ramakrishna not significant changed. No evident pneumothorax or pleural effusion. 
There are overlying cardiac leads.

 

IMPRESSION:  Similar abnormal density in the right upper lobe may be related to patient's history of 
lung carcinoma

## 2019-11-22 NOTE — CT
EXAMINATION TYPE: CT brain wo con

 

DATE OF EXAM: 11/22/2019

 

COMPARISON: MRI brain dated 10/7/2019

 

HISTORY: Disorientation.

 

CT DLP: 1076.4 mGycm

Automated exposure control for dose reduction was used.

 

TECHNIQUE: CT scan of the head is performed without contrast.

 

FINDINGS:   There is no acute intracranial hemorrhage or midline shift identified. There is diffuse v
entricular and sulcal prominence consistent with diffuse age-related cerebral atrophy.  There is low-
attenuation in the periventricular white matter consistent with chronic small vessel ischemic change.
  The globes are intact and the visualized sinuses are clear. Old fracture of the left lamina papyrac
ea. 

 

IMPRESSION:  No acute intracranial hemorrhage or midline shift.  There is diffuse age-related cerebra
l atrophy and chronic small vessel ischemic change noted.
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
## 2019-11-23 RX ADMIN — ACETAMINOPHEN PRN MG: 325 TABLET, FILM COATED ORAL at 15:17

## 2019-11-23 RX ADMIN — CEFAZOLIN SCH MLS/HR: 330 INJECTION, POWDER, FOR SOLUTION INTRAMUSCULAR; INTRAVENOUS at 20:47

## 2019-11-23 RX ADMIN — ENOXAPARIN SODIUM SCH MG: 40 INJECTION SUBCUTANEOUS at 17:03

## 2019-11-23 RX ADMIN — SODIUM CHLORIDE SCH MLS/HR: 9 INJECTION, SOLUTION INTRAVENOUS at 15:13

## 2019-11-23 RX ADMIN — METOPROLOL TARTRATE SCH MG: 25 TABLET, FILM COATED ORAL at 20:46

## 2019-11-23 RX ADMIN — CEFEPIME HYDROCHLORIDE SCH MLS/HR: 2 INJECTION, POWDER, FOR SOLUTION INTRAVENOUS at 17:03

## 2019-11-23 RX ADMIN — PANTOPRAZOLE SODIUM SCH MG: 40 TABLET, DELAYED RELEASE ORAL at 06:24

## 2019-11-23 RX ADMIN — CEFAZOLIN SCH: 330 INJECTION, POWDER, FOR SOLUTION INTRAMUSCULAR; INTRAVENOUS at 08:03

## 2019-11-23 RX ADMIN — Medication SCH MG: at 20:47

## 2019-11-23 RX ADMIN — ACETAMINOPHEN PRN MG: 325 TABLET, FILM COATED ORAL at 08:17

## 2019-11-23 RX ADMIN — SODIUM CHLORIDE SCH MLS/HR: 9 INJECTION, SOLUTION INTRAVENOUS at 08:12

## 2019-11-23 RX ADMIN — ACETAMINOPHEN PRN MG: 325 TABLET, FILM COATED ORAL at 22:28

## 2019-11-23 NOTE — P.HPIM
History of Present Illness


H&P Date: 11/23/19








This is a 69-year-old  male patient of Dr. Mata and Dr. Bundy.

 He does not follow with a cardiologist.  He has a past medical history 

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 

metastatic disease to the neck and lungs with plan for palliative radiation 

therapy that was completed 11/22/2019 after 10 mL of radiation treatment.  

Patient comes in with nausea and vomiting, dysuria, urinary frequency and 

weakness.  And was admitted for acute renal failure, acute UTI, and 

dehydration..  Patient had a recent hospitalization on October 5 at which time 

he was treated for dizziness syncopal episode hypotension secondary to 

lisinopril and hydrochlorothiazide and hydrochlorothiazide was discontinued at 

the time of discharge.  He was again admitted October 31 to November 3, again 

secondary to dehydration, with presyncope and labile blood pressures.





Notes reviewed from oncology, showed a bronchoscopy pathology showing squamous 

cell carcinoma grade 2 from the right upper lobe lung biopsy in 1917 2019, 

showing atypical squamous cells with basaloid appearance, CAT scan of the chest 

09/26/2019, revealed 2 masses in the right upper lobe and enlarged left 

supraclavicular left lower neck lymph node mass measuring 4.6 cm, she has 

recurrent basal cell carcinoma of the face and neck.  MRI of the brain 

10/07/2019 negative from epistasis, PET/CT 10/21/2019, showed uptake in the left

neck area extending to the hyod bone and down towards the medial upper scapula 

uptake als patient has discharge, o noted in the right lung mass





Patient presents emergency room with the above symptoms including weakness, 

followed her urine, vomiting weakness, and fever patient was influenza negative,

patient has dysphagia to solid foods, urinalysis in the emergency room was 

positive for pyuria WBC count of 13.6 uptake acid at 2.3 positive for ketones, 

chest x-ray similar abnormal density right upper lobe patient has sores in the 

left neck, related to basal cell, status post radiation treatment, Dr. Roderick Yost to see on 11/26/2019 surgeon


 











Review of Systems


Constitutional: Reports as per HPI, Denies anorexia, Denies chills, Denies 

chronic headaches, Denies chronic pain, Denies daytime sleepiness, Denies fati

noe, Denies fever, Denies lethargy, Denies malaise, Denies night sweats, Denies 

poor appetite, Denies sweats, Denies weakness, Denies weight gain, Denies weight

loss


Cardiovascular: Reports as per HPI, Denies chest pain, Denies claudication, 

Denies decreased exercise tolerance, Denies dyspnea on exertion, Denies edema, 

Denies high blood pressure, Denies irregular heart beat, Denies leg edema, 

Denies lightheadedness, Denies orthopnea, Denies palpitations, Denies paroxysmal

nocturnal dyspnea, Denies phlebitis, Denies rapid heart beat, Denies shortness 

of breath, Denies syncope


Respiratory: Reports as per HPI, Reports cough


Gastrointestinal: Reports as per HPI, Reports indigestion, Reports vomiting, 

Denies abdominal pain, Denies belching, Denies bloating, Denies BRBPR, Denies 

change in bowel habits, Denies coffee ground emesis, Denies constipation, Denies

diarrhea, Denies dyspepsia, Denies early satiety, Denies excessive gas, Denies 

heartburn, Denies hematemesis, Denies hematochezia, Denies jaundice, Denies 

lactose intolerance, Denies loss of appetite, Denies melena, Denies nausea


Genitourinary: Reports as per HPI, Reports dysuria, Reports urinary frequency, 

Reports urinary hesitancy, Denies decreased libido, Denies difficulties 

fathering child, Denies discharge, Denies erectile dysfunction, Denies flank 

pain, Denies genital pain, Denies genital sores, Denies hematuria, Denies imp

otence, Denies incontinence, Denies kidney stones, Denies nocturia, Denies 

polyuria, Denies testicular lump, Denies testicular pain, Denies urinary 

retention


Musculoskeletal: Reports as per HPI


Integumentary: Reports as per HPI, Reports sores (Left neck redness with 2 areas

of ulceration shallow, without necrosis, significant swelling, related to r

adiation treatments.  Posterior neck left side on 2 cm x 2 cm ulcerated lesion, 

no significant drainage, however it's moist with slight mucoid discharge, 

cultures to be done other lesion above the supraclavicular region posteriorly, 2

cm x 2.5 cm)


Neurological: Reports as per HPI





Past Medical History


Past Medical History: Cancer, Hypertension


Additional Past Medical History / Comment(s): HX SKIN CA, CURRENT LESION ON LEFT

CHEEK, lung CA, CA in the lymph nodes. 10 treatments of radiation over 3 week 

period, last treatment 11/20/19.


History of Any Multi-Drug Resistant Organisms: None Reported


Past Surgical History: Back Surgery, Heart Catheterization, Joint Replacement, 

Orthopedic Surgery


Additional Past Surgical History / Comment(s): PREVIOUS SKIN CA LESIONS REMOVED,

R HIP REPLACEMENT, ANKLE ORIF WITH METAL L, lung biopsy, neck biopsy


Past Anesthesia/Blood Transfusion Reactions: Previous Problems w/ Anesthesia


Additional Past Anesthesia/Blood Transfusion Reaction / Comment(s): Pt woke up 

violently, which is out of characteristic for the pt


Past Psychological History: No Psychological Hx Reported


Smoking Status: Never smoker


Past Alcohol Use History: Occasional


Additional Past Alcohol Use History / Comment(s): Patient is a lifelong 

nonsmoker but had extensive exposure to asbestos and acid at [employer] as 

well as plastic exposure and [employer].


Past Drug Use History: None Reported





- Past Family History


  ** Mother


Family Medical History: Cancer, Deep Vein Thrombosis (DVT)


Additional Family Medical History / Comment(s): BREAST CA





Medications and Allergies


                                Home Medications











 Medication  Instructions  Recorded  Confirmed  Type


 


Acetaminophen [Tylenol Arthritis] 650 mg PO BID PRN 10/31/19 11/22/19 History


 


Melatonin 10 mg PO HS #30 tablet.er 11/03/19 11/22/19 Rx


 


hydrALAZINE HCL [Apresoline] 25 mg PO TID #90 tab 11/03/19 11/22/19 Rx


 


Metoprolol Tartrate [Lopressor] 12.5 mg PO HS 11/22/19 11/22/19 History








                                    Allergies











Allergy/AdvReac Type Severity Reaction Status Date / Time


 


No Known Allergies Allergy   Verified 11/22/19 16:10














Physical Exam


Vitals: 


                                   Vital Signs











  Temp Pulse Pulse Pulse Resp BP BP


 


 11/23/19 11:35      18  


 


 11/23/19 11:00  99.1 F    89  18   139/77


 


 11/23/19 08:00  99.9 F H    101 H  18   139/85


 


 11/23/19 04:00  98.6 F    98  16   152/90


 


 11/22/19 23:15  99.0 F    86  18   123/74


 


 11/22/19 19:50  98.7 F    96  20   126/80


 


 11/22/19 18:57  101 F H   105 H   16   123/78


 


 11/22/19 18:30  101.0 F H  103 H    16  


 


 11/22/19 18:00   107 H    18  144/88 


 


 11/22/19 15:53  102.2 F H      


 


 11/22/19 15:48   102 H    18  157/95 


 


 11/22/19 14:13  99.5 F  126 H    20  149/91 














  Pulse Ox


 


 11/23/19 11:35 


 


 11/23/19 11:00  95


 


 11/23/19 08:00  98


 


 11/23/19 04:00  98


 


 11/22/19 23:15  99


 


 11/22/19 19:50  98


 


 11/22/19 18:57  98


 


 11/22/19 18:30  99


 


 11/22/19 18:00  99


 


 11/22/19 15:53 


 


 11/22/19 15:48  100


 


 11/22/19 14:13  95








                                Intake and Output











 11/22/19 11/23/19 11/23/19





 22:59 06:59 14:59


 


Intake Total 1240  


 


Output Total  500 


 


Balance 1240 -500 


 


Intake:   


 


  Amount of Fluid Infused ( 1200  





  ml)   


 


  Oral 40  


 


Output:   


 


  Urine  500 


 


Other:   


 


  Voiding Method Urinal Urinal Urinal


 


  # Voids  1 


 


  Weight  77.9 kg 77.9 kg














- Constitutional


General appearance: average body habitus, cooperative, no acute distress





- EENT


Eyes: anicteric sclerae, EOMI, PERRLA, dentition normal, normal appearance


ENT: NA/AT, normal oropharynx





- Neck


Neck: normal ROM





- Respiratory


Respiratory: bilateral: CTA, negative: diminished, dullness, rales





- Cardiovascular


Rhythm: regular


Heart sounds: normal: S1, S2


Abnormal Heart Sounds: no systolic murmur, no diastolic murmur, no rub, no S3 

Gallop, no S4 Gallop, no click, no other





- Gastrointestinal


General gastrointestinal: normal bowel sounds, soft





- Integumentary


Integumentary: decreased turgor, normal





- Neurologic


Neurologic: CNII-XII intact





- Musculoskeletal


Musculoskeletal: gait normal, strength equal bilaterally





- Psychiatric


Psychiatric: A&O x's 3, appropriate affect, intact judgment & insight





Results


CBC & Chem 7: 


                                 11/22/19 14:53





                                 11/22/19 14:53


Labs: 


                  Abnormal Lab Results - Last 24 Hours (Table)











  11/22/19 11/22/19 11/22/19 Range/Units





  14:53 14:53 14:53 


 


WBC  13.6 H    (3.8-10.6)  k/uL


 


Hgb  10.8 L    (13.0-17.5)  gm/dL


 


Hct  34.8 L    (39.0-53.0)  %


 


MCV  73.9 L    (80.0-100.0)  fL


 


MCH  23.0 L    (25.0-35.0)  pg


 


RDW  16.3 H    (11.5-15.5)  %


 


Neutrophils #  12.7 H    (1.3-7.7)  k/uL


 


Lymphocytes #  0.3 L    (1.0-4.8)  k/uL


 


Sodium   136 L   (137-145)  mmol/L


 


Glucose   131 H   (74-99)  mg/dL


 


Plasma Lactic Acid Javi    2.3 H*  (0.7-2.0)  mmol/L


 


TSH   0.301 L   (0.465-4.680)  mIU/L


 


Urine Protein     (Negative)  


 


Urine Ketones     (Negative)  


 


Urine Blood     (Negative)  


 


Urine RBC     (0-5)  /hpf


 


Urine WBC     (0-5)  /hpf


 


Hyaline Casts     (0-2)  /lpf


 


Urine Mucus     (None)  /hpf














  11/22/19 Range/Units





  15:10 


 


WBC   (3.8-10.6)  k/uL


 


Hgb   (13.0-17.5)  gm/dL


 


Hct   (39.0-53.0)  %


 


MCV   (80.0-100.0)  fL


 


MCH   (25.0-35.0)  pg


 


RDW   (11.5-15.5)  %


 


Neutrophils #   (1.3-7.7)  k/uL


 


Lymphocytes #   (1.0-4.8)  k/uL


 


Sodium   (137-145)  mmol/L


 


Glucose   (74-99)  mg/dL


 


Plasma Lactic Acid Javi   (0.7-2.0)  mmol/L


 


TSH   (0.465-4.680)  mIU/L


 


Urine Protein  2+ H  (Negative)  


 


Urine Ketones  1+ H  (Negative)  


 


Urine Blood  Moderate H  (Negative)  


 


Urine RBC  >182 H  (0-5)  /hpf


 


Urine WBC  27 H  (0-5)  /hpf


 


Hyaline Casts  8 H  (0-2)  /lpf


 


Urine Mucus  Many H  (None)  /hpf








                      Microbiology - Last 24 Hours (Table)











 11/22/19 15:10 Urine Culture - Preliminary





 Urine,Voided 








                               Laboratory Results











WBC  13.6 k/uL (3.8-10.6)  H  11/22/19  14:53    


 


RBC  4.71 m/uL (4.30-5.90)   11/22/19  14:53    


 


Hgb  10.8 gm/dL (13.0-17.5)  L  11/22/19  14:53    


 


Hct  34.8 % (39.0-53.0)  L  11/22/19  14:53    


 


MCV  73.9 fL (80.0-100.0)  L  11/22/19  14:53    


 


MCH  23.0 pg (25.0-35.0)  L  11/22/19  14:53    


 


MCHC  31.2 g/dL (31.0-37.0)   11/22/19  14:53    


 


RDW  16.3 % (11.5-15.5)  H  11/22/19  14:53    


 


Plt Count  304 k/uL (150-450)   11/22/19  14:53    


 


Neutrophils %  93 %  11/22/19  14:53    


 


Lymphocytes %  2 %  11/22/19  14:53    


 


Monocytes %  3 %  11/22/19  14:53    


 


Eosinophils %  0 %  11/22/19  14:53    


 


Basophils %  0 %  11/22/19  14:53    


 


Neutrophils #  12.7 k/uL (1.3-7.7)  H  11/22/19  14:53    


 


Lymphocytes #  0.3 k/uL (1.0-4.8)  L  11/22/19  14:53    


 


Monocytes #  0.5 k/uL (0-1.0)   11/22/19  14:53    


 


Eosinophils #  0.0 k/uL (0-0.7)   11/22/19  14:53    


 


Basophils #  0.0 k/uL (0-0.2)   11/22/19  14:53    


 


Hypochromasia  Moderate   11/22/19  14:53    


 


Anisocytosis  Slight   11/22/19  14:53    


 


Microcytosis  Moderate   11/22/19  14:53    


 


PT  11.0 sec (9.0-12.0)   11/22/19  14:53    


 


INR  1.0  (<1.2)   11/22/19  14:53    


 


APTT  25.7 sec (22.0-30.0)   11/22/19  14:53    


 


Sodium  136 mmol/L (137-145)  L  11/22/19  14:53    


 


Potassium  3.6 mmol/L (3.5-5.1)   11/22/19  14:53    


 


Chloride  99 mmol/L ()   11/22/19  14:53    


 


Carbon Dioxide  24 mmol/L (22-30)   11/22/19  14:53    


 


Anion Gap  13 mmol/L  11/22/19  14:53    


 


BUN  10 mg/dL (9-20)   11/22/19  14:53    


 


Creatinine  0.81 mg/dL (0.66-1.25)   11/22/19  14:53    


 


Est GFR (CKD-EPI)AfAm  >90  (>60 ml/min/1.73 sqM)   11/22/19  14:53    


 


Est GFR (CKD-EPI)NonAf  >90  (>60 ml/min/1.73 sqM)   11/22/19  14:53    


 


Glucose  131 mg/dL (74-99)  H  11/22/19  14:53    


 


Lactic Ac Sepsis Rflx  Y   11/22/19  15:19    


 


Plasma Lactic Acid Javi  1.1 mmol/L (0.7-2.0)   11/22/19  18:41    


 


Calcium  9.4 mg/dL (8.4-10.2)   11/22/19  14:53    


 


Ionized Calcium Joo  4.6 mg/dL (4.5-5.3)   11/22/19  14:53    


 


Phosphorus  3.2 mg/dL (2.5-4.5)   11/22/19  14:53    


 


Magnesium  1.8 mg/dL (1.6-2.3)   11/22/19  14:53    


 


Total Bilirubin  1.1 mg/dL (0.2-1.3)   11/22/19  14:53    


 


AST  35 U/L (17-59)   11/22/19  14:53    


 


ALT  39 U/L (21-72)   11/22/19  14:53    


 


Alkaline Phosphatase  74 U/L ()   11/22/19  14:53    


 


Troponin I  0.022 ng/mL (0.000-0.034)   11/22/19  14:53    


 


NT-Pro-B Natriuret Pep  1220 pg/mL  11/22/19  14:53    


 


Total Protein  8.2 g/dL (6.3-8.2)   11/22/19  14:53    


 


Albumin  4.1 g/dL (3.5-5.0)   11/22/19  14:53    


 


TSH  0.301 mIU/L (0.465-4.680)  L  11/22/19  14:53    


 


Urine Color  Yellow   11/22/19  15:10    


 


Urine Appearance  Cloudy  (Clear)   11/22/19  15:10    


 


Urine pH  6.0  (5.0-8.0)   11/22/19  15:10    


 


Ur Specific Gravity  1.018  (1.001-1.035)   11/22/19  15:10    


 


Urine Protein  2+  (Negative)  H  11/22/19  15:10    


 


Urine Glucose (UA)  Negative  (Negative)   11/22/19  15:10    


 


Urine Ketones  1+  (Negative)  H  11/22/19  15:10    


 


Urine Blood  Moderate  (Negative)  H  11/22/19  15:10    


 


Urine Nitrite  Negative  (Negative)   11/22/19  15:10    


 


Urine Bilirubin  Negative  (Negative)   11/22/19  15:10    


 


Urine Urobilinogen  3.0 mg/dL (<2.0)   11/22/19  15:10    


 


Ur Leukocyte Esterase  Negative  (Negative)   11/22/19  15:10    


 


Urine RBC  >182 /hpf (0-5)  H  11/22/19  15:10    


 


Urine WBC  27 /hpf (0-5)  H  11/22/19  15:10    


 


Ur Squamous Epith Cells  <1 /hpf (0-4)   11/22/19  15:10    


 


Hyaline Casts  8 /lpf (0-2)  H  11/22/19  15:10    


 


Urine Mucus  Many /hpf (None)  H  11/22/19  15:10    


 


Influenza Type A RNA  Not Detected  (Not Detectd)   11/22/19  15:55    


 


Influenza Type B (PCR)  Not Detected  (Not Detectd)   11/22/19  15:55    














Thrombosis Risk Factor Assmnt





- DVT/VTE Prophylaxis


DVT/VTE Prophylaxis: Pharmacologic Prophylaxis ordered





- Choose All That Apply


Each Factor Represents 1 point: Medical pt on bed rest, Obesity (BMI >25), 

Sepsis (< 1month)


Each Risk Factor Represents 2 Points: Age 61-74 years, Malignancy


Thrombosis Risk Factor Assessment Total Risk Factor Score: 7


Thrombosis Risk Factor Assessment Level: High Risk





Assessment and Plan


Plan: 





1, sepsis with SIRS, presenting with recent neck region, local cellulitis in the

 neck most likely from magician, chronic nonhealing wound left neck mass is savita

picious for squamous as squamous cell carcinoma with 2 previous fine-needle 

biopsies,   for which she is scheduled to have Dr. Roderick Yost for surgical

 excision scheduled to see 11/26/2019 cultures to be obtained from the wound 

left neck, consult with Dr. Kim infectious disease, and vancomycin IV 2 

cultures are back and cefepime 2 g every 8 hours, patient had fevers, influenza 

negative blood cultures sent and pending





2.  Recent diagnosis of squamous cell carcinoma lung by an navigational 

bronchoscopy 9/26/2019 follows with  and was recently seen in his 

office, with plan of care as directed.  Lifelong nonsmoker





3.  History of basal cell cancer left temple with previous excision





4.  Pyuria with urinary tract symptomatology, cultures are sent, patient will be

 started on IV antibiotics Rocephin,





5.  Dysphagia with patient and neck radiation, completed treatment November 22 2019, patient will be started mechanical soft diet, protein supplementation with

 ensure protein,





6.  Clinical Dehydration, IV hydration, X





6Autonomic dysfunction IV hydration, monitor for orthostasis, might need 

additional medications





CK D stage II without worsening of renal function





GI prophylaxis with PPI and DVT prophylaxis high risk, subcu Lovenox





CODE STATUS full

## 2019-11-24 LAB
ANION GAP SERPL CALC-SCNC: 9 MMOL/L
BASOPHILS # BLD AUTO: 0.1 K/UL (ref 0–0.2)
BASOPHILS NFR BLD AUTO: 1 %
BUN SERPL-SCNC: 8 MG/DL (ref 9–20)
CALCIUM SPEC-MCNC: 8 MG/DL (ref 8.4–10.2)
CHLORIDE SERPL-SCNC: 100 MMOL/L (ref 98–107)
CO2 SERPL-SCNC: 26 MMOL/L (ref 22–30)
EOSINOPHIL # BLD AUTO: 0 K/UL (ref 0–0.7)
EOSINOPHIL NFR BLD AUTO: 0 %
ERYTHROCYTE [DISTWIDTH] IN BLOOD BY AUTOMATED COUNT: 4.07 M/UL (ref 4.3–5.9)
ERYTHROCYTE [DISTWIDTH] IN BLOOD: 16.6 % (ref 11.5–15.5)
GLUCOSE SERPL-MCNC: 103 MG/DL (ref 74–99)
HCT VFR BLD AUTO: 30.4 % (ref 39–53)
HGB BLD-MCNC: 9.4 GM/DL (ref 13–17.5)
LYMPHOCYTES # SPEC AUTO: 0.3 K/UL (ref 1–4.8)
LYMPHOCYTES NFR SPEC AUTO: 4 %
MCH RBC QN AUTO: 23.2 PG (ref 25–35)
MCHC RBC AUTO-ENTMCNC: 31.1 G/DL (ref 31–37)
MCV RBC AUTO: 74.7 FL (ref 80–100)
MONOCYTES # BLD AUTO: 0.4 K/UL (ref 0–1)
MONOCYTES NFR BLD AUTO: 6 %
NEUTROPHILS # BLD AUTO: 6.6 K/UL (ref 1.3–7.7)
NEUTROPHILS NFR BLD AUTO: 87 %
PLATELET # BLD AUTO: 262 K/UL (ref 150–450)
POTASSIUM SERPL-SCNC: 3.1 MMOL/L (ref 3.5–5.1)
SODIUM SERPL-SCNC: 135 MMOL/L (ref 137–145)
WBC # BLD AUTO: 7.6 K/UL (ref 3.8–10.6)

## 2019-11-24 RX ADMIN — SODIUM CHLORIDE SCH MLS/HR: 9 INJECTION, SOLUTION INTRAVENOUS at 00:12

## 2019-11-24 RX ADMIN — ACETAMINOPHEN PRN MG: 325 TABLET, FILM COATED ORAL at 04:58

## 2019-11-24 RX ADMIN — CEFEPIME HYDROCHLORIDE SCH MLS/HR: 2 INJECTION, POWDER, FOR SOLUTION INTRAVENOUS at 08:24

## 2019-11-24 RX ADMIN — SODIUM CHLORIDE SCH MLS/HR: 9 INJECTION, SOLUTION INTRAVENOUS at 11:04

## 2019-11-24 RX ADMIN — Medication SCH MG: at 19:43

## 2019-11-24 RX ADMIN — ENOXAPARIN SODIUM SCH MG: 40 INJECTION SUBCUTANEOUS at 08:25

## 2019-11-24 RX ADMIN — METOPROLOL TARTRATE SCH MG: 25 TABLET, FILM COATED ORAL at 19:42

## 2019-11-24 RX ADMIN — ACETAMINOPHEN PRN MG: 325 TABLET, FILM COATED ORAL at 22:21

## 2019-11-24 RX ADMIN — CEFEPIME HYDROCHLORIDE SCH MLS/HR: 2 INJECTION, POWDER, FOR SOLUTION INTRAVENOUS at 00:12

## 2019-11-24 RX ADMIN — CEFAZOLIN SCH: 330 INJECTION, POWDER, FOR SOLUTION INTRAMUSCULAR; INTRAVENOUS at 08:16

## 2019-11-24 RX ADMIN — SODIUM CHLORIDE SCH MLS/HR: 9 INJECTION, SOLUTION INTRAVENOUS at 16:02

## 2019-11-24 RX ADMIN — PANTOPRAZOLE SODIUM SCH MG: 40 TABLET, DELAYED RELEASE ORAL at 06:11

## 2019-11-24 RX ADMIN — ACETAMINOPHEN PRN MG: 325 TABLET, FILM COATED ORAL at 11:10

## 2019-11-24 RX ADMIN — CEFEPIME HYDROCHLORIDE SCH MLS/HR: 2 INJECTION, POWDER, FOR SOLUTION INTRAVENOUS at 15:58

## 2019-11-24 NOTE — P.PN
Subjective


Progress Note Date: 11/24/19











This is a 69-year-old  male patient of Dr. Mata and Dr. Bundy.

 He does not follow with a cardiologist.  He has a past medical history 

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 

metastatic disease to the neck and lungs with plan for palliative radiation 

therapy that was completed 11/22/2019 after 10 mL of radiation treatment.  

Patient comes in with nausea and vomiting, dysuria, urinary frequency and 

weakness.  And was admitted for acute renal failure, acute UTI, and 

dehydration..  Patient had a recent hospitalization on October 5 at which time 

he was treated for dizziness syncopal episode hypotension secondary to 

lisinopril and hydrochlorothiazide and hydrochlorothiazide was discontinued at 

the time of discharge.  He was again admitted October 31 to November 3, again 

secondary to dehydration, with presyncope and labile blood pressures.





Notes reviewed from oncology, showed a bronchoscopy pathology showing squamous 

cell carcinoma grade 2 from the right upper lobe lung biopsy in 1917 2019, 

showing atypical squamous cells with basaloid appearance, CAT scan of the chest 

09/26/2019, revealed 2 masses in the right upper lobe and enlarged left 

supraclavicular left lower neck lymph node mass measuring 4.6 cm, she has 

recurrent basal cell carcinoma of the face and neck.  MRI of the brain 

10/07/2019 negative from epistasis, PET/CT 10/21/2019, showed uptake in the left

neck area extending to the hyod bone and down towards the medial upper scapula 

uptake als patient has discharge, o noted in the right lung mass





Patient presents emergency room with the above symptoms including weakness, 

followed her urine, vomiting weakness, and fever patient was influenza negative,

patient has dysphagia to solid foods, urinalysis in the emergency room was 

positive for pyuria WBC count of 13.6 uptake acid at 2.3 positive for ketones, 

chest x-ray similar abnormal density right upper lobe patient has sores in the 

left neck, related to basal cell, status post radiation treatment, Dr. Roderick Yost to see on 11/26/2019 surgeon


 





11/24: Patient is somehow slightly better, there is no aspirate events, still 

febrile, blood cultures are still currently pending, wound cultures from the 

neck is currently pending, infectious disease following no diarrhea no 

lightheadedness no chest pain











 Review of Systems 


Constitutional: Reports as per HPI, Denies anorexia, Denies chills, Denies 

chronic headaches, Denies chronic pain, Denies daytime sleepiness, Denies 

fatigue, Denies fever, Denies lethargy, Denies malaise, Denies night sweats, 

Denies poor appetite, Denies sweats, Denies weakness, Denies weight gain, Denies

weight loss


Cardiovascular: Reports as per HPI, Denies chest pain, Denies claudication, 

Denies decreased exercise tolerance, Denies dyspnea on exertion, Denies edema, 

Denies high blood pressure, Denies irregular heart beat, Denies leg edema, 

Denies lightheadedness, Denies orthopnea, Denies palpitations, Denies paroxysmal

nocturnal dyspnea, Denies phlebitis, Denies rapid heart beat, Denies shortness 

of breath, Denies syncope


Respiratory: Reports as per HPI, Reports cough


Gastrointestinal: Reports as per HPI, Reports indigestion, Reports vomiting, 

Denies abdominal pain, Denies belching, Denies bloating, Denies BRBPR, Denies 

change in bowel habits, Denies coffee ground emesis, Denies constipation, Denies

diarrhea, Denies dyspepsia, Denies early satiety, Denies excessive gas, Denies 

heartburn, Denies hematemesis, Denies hematochezia, Denies jaundice, Denies 

lactose intolerance, Denies loss of appetite, Denies melena, Denies nausea


Genitourinary: Reports as per HPI, Reports dysuria, Reports urinary frequency, 

Reports urinary hesitancy, Denies decreased libido, Denies difficulties 

fathering child, Denies discharge, Denies erectile dysfunction, Denies flank 

pain, Denies genital pain, Denies genital sores, Denies hematuria, Denies 

impotence, Denies incontinence, Denies kidney stones, Denies nocturia, Denies 

polyuria, Denies testicular lump, Denies testicular pain, Denies urinary 

retention


Musculoskeletal: Reports as per HPI


Integumentary: Reports as per HPI, Reports sores (Left neck redness with 2 areas

of ulceration shallow, without necrosis, significant swelling, related to 

radiation treatments.  Posterior neck left side on 2 cm x 2 cm ulcerated lesion,

no significant drainage, however it's moist with slight mucoid discharge, 

cultures to be done other lesion above the supraclavicular region posteriorly, 2

cm x 2.5 cm)


Neurological: Reports as per HPI





Objective





- Vital Signs


Vital signs: 


                                   Vital Signs











Temp  100 F H  11/24/19 11:07


 


Pulse  93   11/24/19 11:07


 


Resp  18   11/24/19 11:07


 


BP  174/93   11/24/19 11:07


 


Pulse Ox  97   11/24/19 11:07








                                 Intake & Output











 11/23/19 11/24/19 11/24/19





 18:59 06:59 18:59


 


Intake Total 240  480


 


Output Total 200 1150 580


 


Balance 40 -1150 -100


 


Weight 77.9 kg 80.8 kg 


 


Intake:   


 


  Oral 240  480


 


Output:   


 


  Urine 200 1150 580


 


Other:   


 


  Voiding Method Urinal Urinal Urinal


 


  # Voids  1 














- Constitutional


General appearance: Present: cooperative, no acute distress





- EENT


Eyes: Present: anicteric sclerae, EOMI, PERRLA, dentition normal


ENT: Present: NA/AT, normal oropharynx





- Neck


Details: 





2 lesions measuring 3 cm x 2 cm with mucoid discharge inferior to the neck near 

the scapular area, more located posteriorly, and 2 x 2 centimeter in the upper 

neck, no central necrosis noted


Neck: Present: lymphadenopathy





- Respiratory


Respiratory: bilateral: CTA, negative: diminished, dullness, rales





- Cardiovascular


Rhythm: regular


Heart sounds: normal: S1, S2


Abnormal Heart Sounds: Absent: systolic murmur, diastolic murmur, rub, S3 

Gallop, S4 Gallop, click, other





- Gastrointestinal


General gastrointestinal: Present: normal bowel sounds, soft





- Integumentary


Integumentary: Present: decreased turgor, normal





- Neurologic


Neurologic: Present: CNII-XII intact





- Musculoskeletal


Musculoskeletal: Present: gait normal, strength equal bilaterally





- Psychiatric


Psychiatric: Present: A&O x's 3, appropriate affect





- Labs


CBC & Chem 7: 


                                 11/24/19 06:55





                                 11/24/19 06:55


Labs: 


                  Abnormal Lab Results - Last 24 Hours (Table)











  11/24/19 11/24/19 Range/Units





  06:55 06:55 


 


RBC   4.07 L  (4.30-5.90)  m/uL


 


Hgb   9.4 L  (13.0-17.5)  gm/dL


 


Hct   30.4 L  (39.0-53.0)  %


 


MCV   74.7 L  (80.0-100.0)  fL


 


MCH   23.2 L  (25.0-35.0)  pg


 


RDW   16.6 H  (11.5-15.5)  %


 


Lymphocytes #   0.3 L  (1.0-4.8)  k/uL


 


Sodium  135 L   (137-145)  mmol/L


 


Potassium  3.1 L   (3.5-5.1)  mmol/L


 


BUN  8 L   (9-20)  mg/dL


 


Creatinine  0.62 L   (0.66-1.25)  mg/dL


 


Glucose  103 H   (74-99)  mg/dL


 


Calcium  8.0 L   (8.4-10.2)  mg/dL








                      Microbiology - Last 24 Hours (Table)











 11/23/19 17:00 Gram Stain - Preliminary





 Neck Wound Culture - Preliminary


 


 11/23/19 17:00 Anaerobic Culture - Preliminary





 Neck 


 


 11/22/19 16:10 Blood Culture - Preliminary





 Blood    No Growth after 24 hours


 


 11/22/19 15:10 Urine Culture - Final





 Urine,Voided 














Assessment and Plan


Plan: 





1, sepsis with SIRS, presenting with recent neck region,  basal cell carcinoma 

local cellulitis in the neck  with regurgitation cellulitis chronic nonhealing 

wound left neck mass,  for which she is scheduled to have Dr. Roderick Yost 

for surgical excision scheduled to see 11/26/2019 cultures to be obtained from 

the wound left neck, consult with Dr. Kim infectious disease, and vancomycin 

IV 2 cultures are back and cefepime 2 g every 8 hours, patient had fevers, 

influenza negative blood cultures sent and pending





2.  Recent diagnosis of suspicious squamous cell carcinoma lung right lung by an

navigational bronchoscopy 9/26/2019 follows with  and was recently 

seen in his office, with plan of care as directed.  Lifelong nonsmoker





3.  History of basal cell cancer left temple with previous excision





4.  Pyuria with urinary tract symptomatology, cultures are sent, patient will be

started on IV antibiotics 





5.  Dysphagia with patient and neck radiation, completed treatment November 22 2019, patient will be started mechanical soft diet, protein supplementation with

ensure protein,





6.  Clinical Dehydration, IV hydration, X





6Autonomic dysfunction IV hydration, monitor for orthostasis, might need 

additional medications





CK D stage II without worsening of renal function





GI prophylaxis with PPI and DVT prophylaxis high risk, subcu Lovenox





CODE STATUS full

## 2019-11-25 LAB
ANION GAP SERPL CALC-SCNC: 10 MMOL/L
BUN SERPL-SCNC: 6 MG/DL (ref 9–20)
CALCIUM SPEC-MCNC: 8.5 MG/DL (ref 8.4–10.2)
CHLORIDE SERPL-SCNC: 98 MMOL/L (ref 98–107)
CO2 SERPL-SCNC: 28 MMOL/L (ref 22–30)
GLUCOSE SERPL-MCNC: 90 MG/DL (ref 74–99)
POTASSIUM SERPL-SCNC: 3.2 MMOL/L (ref 3.5–5.1)
SODIUM SERPL-SCNC: 136 MMOL/L (ref 137–145)

## 2019-11-25 RX ADMIN — SODIUM CHLORIDE SCH MLS/HR: 9 INJECTION, SOLUTION INTRAVENOUS at 17:43

## 2019-11-25 RX ADMIN — CEFEPIME HYDROCHLORIDE SCH MLS/HR: 2 INJECTION, POWDER, FOR SOLUTION INTRAVENOUS at 00:24

## 2019-11-25 RX ADMIN — CEFEPIME HYDROCHLORIDE SCH MLS/HR: 2 INJECTION, POWDER, FOR SOLUTION INTRAVENOUS at 17:42

## 2019-11-25 RX ADMIN — CEFEPIME HYDROCHLORIDE SCH MLS/HR: 2 INJECTION, POWDER, FOR SOLUTION INTRAVENOUS at 22:43

## 2019-11-25 RX ADMIN — CEFAZOLIN SCH MLS/HR: 330 INJECTION, POWDER, FOR SOLUTION INTRAMUSCULAR; INTRAVENOUS at 08:37

## 2019-11-25 RX ADMIN — CEFEPIME HYDROCHLORIDE SCH MLS/HR: 2 INJECTION, POWDER, FOR SOLUTION INTRAVENOUS at 08:37

## 2019-11-25 RX ADMIN — ACETAMINOPHEN PRN MG: 325 TABLET, FILM COATED ORAL at 06:57

## 2019-11-25 RX ADMIN — POTASSIUM CHLORIDE SCH MEQ: 20 TABLET, EXTENDED RELEASE ORAL at 22:42

## 2019-11-25 RX ADMIN — SODIUM CHLORIDE SCH MLS/HR: 9 INJECTION, SOLUTION INTRAVENOUS at 08:36

## 2019-11-25 RX ADMIN — CEFAZOLIN SCH MLS/HR: 330 INJECTION, POWDER, FOR SOLUTION INTRAMUSCULAR; INTRAVENOUS at 00:24

## 2019-11-25 RX ADMIN — Medication SCH MG: at 20:47

## 2019-11-25 RX ADMIN — PANTOPRAZOLE SODIUM SCH MG: 40 TABLET, DELAYED RELEASE ORAL at 06:57

## 2019-11-25 RX ADMIN — POTASSIUM CHLORIDE SCH MEQ: 20 TABLET, EXTENDED RELEASE ORAL at 21:43

## 2019-11-25 RX ADMIN — ENOXAPARIN SODIUM SCH MG: 40 INJECTION SUBCUTANEOUS at 08:36

## 2019-11-25 RX ADMIN — CEFAZOLIN SCH: 330 INJECTION, POWDER, FOR SOLUTION INTRAMUSCULAR; INTRAVENOUS at 20:48

## 2019-11-25 RX ADMIN — METOPROLOL TARTRATE SCH MG: 25 TABLET, FILM COATED ORAL at 20:47

## 2019-11-25 RX ADMIN — SODIUM CHLORIDE SCH MLS/HR: 9 INJECTION, SOLUTION INTRAVENOUS at 00:24

## 2019-11-25 NOTE — P.PN
Subjective


Progress Note Date: 11/25/19





This is a 69-year-old  male patient of Dr. Mata and Dr. Bundy.

 He does not follow with a cardiologist.  He has a past medical history 

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 

metastatic disease to the neck and lungs with plan for palliative radiation t

herapy that was completed 11/22/2019 after 10 mL of radiation treatment.  

Patient comes in with nausea and vomiting, dysuria, urinary frequency and 

weakness.  And was admitted for acute renal failure, acute UTI, and 

dehydration..  Patient had a recent hospitalization on October 5 at which time 

he was treated for dizziness syncopal episode hypotension secondary to 

lisinopril and hydrochlorothiazide and hydrochlorothiazide was discontinued at 

the time of discharge.  He was again admitted October 31 to November 3, again 

secondary to dehydration, with presyncope and labile blood pressures.





Notes reviewed from oncology, showed a bronchoscopy pathology showing basal cell

carcinoma.  CAT scan of the chest 09/26/2019, revealed 2 masses in the right 

upper lobe and enlarged left supraclavicular left lower neck lymph node mass 

measuring 4.6 cm, she has recurrent basal cell carcinoma of the face and neck.  

MRI of the brain 10/07/2019 negative for metastasis, PET/CT 10/21/2019, showed 

hypermetabolic uptake in the right upper lobe in the left neck.





Patient presents emergency room with the above symptoms including weakness, 

followed her urine, vomiting weakness, and fever patient was influenza negative,

patient has dysphagia to solid foods, urinalysis in the emergency room was 

positive for pyuria WBC count of 13.6 uptake acid at 2.3 positive for ketones, 

chest x-ray similar abnormal density right upper lobe patient has sores in the 

left neck, related to basal cell, status post radiation treatment, Dr. Roderick Yost to see on 11/26/2019 surgeon.  This was ENT surgeon patient was to see 

at the being of his treatment course.  Patient delayed evaluation as he was not 

intending to have surgery.


 





11/24: Patient is somehow slightly better, there is no aspirate events, still 

febrile, blood cultures are still currently pending, wound cultures from the 

neck is currently pending, infectious disease following no diarrhea no 

lightheadedness no chest pain








11/25: Patient was seen in consultation by Dr. Kim with plan to continue 

cefepime and vancomycin, local wound care with medihoney.  Modified barium 

swallow was unremarkable.  CAT scan of the neck revealed concern for abscess.  S

mall multifocal component difficult to exclude.  Associated cellulitis.  Left-

sided retrocaval soft tissue mass may reflect conglomerate adenopathy.  

Infiltration to the left C3 transverse process.  Thrombosed left jugular vein is

difficult to exclude.  Case discussed with Dr. Taylor and Dr. Payne by Dr. Helm, patient has had a cyst in the lymph node left neck since he started 

treatment.  Unsure if this possible abscess is the same.  Dr. Payne consult 

added.  We will also add vascular consult for the thrombus in the left jugular 

vein.  The patient is requesting Xanax for sleep at bedtime which will be added.

 Patient is complaining of sore throat which she thinks is related to radiation.

 His last radiation treatment was Wednesday.  Temperature max 100.5, heart rate 

98, blood pressure 149/80, pulse ox 96% on room air.








Review Of Systems:


Constitutional: Reports fever, no chills, no night sweats.  No weight change.  

No weakness, fatigue or lethargy.  Reports daytime sleepiness.


EENT: No headache.  No blurred vision or double vision, no loss of vision.  No 

loss of Hearing, no ringing in the ears, no dizziness.  No nasal drainage or 

congestion.  No epistaxis.  Reports sore throat.


Lungs: No shortness of breath, cough, no sputum production.  No wheezing.


Cardiovascular: No chest pain, no lower extremity edema.  No palpitations.  No 

paroxysmal nocturnal dyspnea.  No orthopnea.  No lightheadedness or dizziness.  

No syncopal episodes.


Abdominal: No abdominal pain.  No nausea, vomiting.  No diarrhea.  No 

constipation.  No bloody or tarry stools..  No loss of appetite.


Genitourinary: No dysuria, increased frequency, urgency.  No urinary retention.


Musculoskeletal: No myalgias.  No muscle weakness, no gait dysfunction, no 

frequent falls.  No back pain.  No neck pain.


Integumentary: No wounds, no lesions.  No rash or pruritus.  No unusual 

bruising.  No change in hair or nails.


Neurologic: No aphasia. No facial droop. No change in mentation. No head injury.

No headache. No paralysis. No paresthesia.


Psychiatric: No depression.  No anxiety.  No mood swings.


Endocrine: No abnormal blood sugars.  No weight change.  No excessive sweating 

or thirst.








Objective





- Vital Signs


Vital signs: 


                                   Vital Signs











Temp  98.3 F   11/25/19 11:44


 


Pulse  96   11/25/19 11:44


 


Resp  16   11/25/19 11:44


 


BP  149/80   11/25/19 08:00


 


Pulse Ox  92 L  11/25/19 11:44








                                 Intake & Output











 11/24/19 11/25/19 11/25/19





 18:59 06:59 18:59


 


Intake Total 698  


 


Output Total 580 1950 


 


Balance 118 -1950 


 


Weight  79.8 kg 


 


Intake:   


 


  Oral 698  


 


Output:   


 


  Urine 580 1950 


 


Other:   


 


  Voiding Method Urinal Urinal Urinal


 


  # Voids  1 














- Exam





General appearance: Present: cooperative, no acute distress, resting on the edge

of the bed, wife is at bedside





- EENT


Eyes: Present: anicteric sclerae, EOMI, PERRLA, dentition normal


ENT: Present: NA/AT, normal oropharynx





- Neck


Details: 





2 lesions measuring 3 cm x 2 cm with mucoid discharge inferior to the neck near 

the scapular area, more located posteriorly, and 2 x 2 centimeter in the upper 

neck, no central necrosis noted


Neck: Present: lymphadenopathy





- Respiratory


Respiratory: bilateral: CTA, negative: diminished, dullness, rales





- Cardiovascular


Rhythm: regular


Heart sounds: normal: S1, S2


Abnormal Heart Sounds: Absent: systolic murmur, diastolic murmur, rub, S3 

Gallop, S4 Gallop, click, other





- Gastrointestinal


General gastrointestinal: Present: normal bowel sounds, soft





- Integumentary


Integumentary: Present: decreased turgor, normal





- Neurologic


Neurologic: Present: CNII-XII intact





- Musculoskeletal


Musculoskeletal: Present: gait normal, strength equal bilaterally





- Psychiatric


Psychiatric: Present: A&O x's 3, appropriate affect








- Labs


CBC & Chem 7: 


                                 11/24/19 06:55





                                 11/25/19 05:32


Labs: 


                  Abnormal Lab Results - Last 24 Hours (Table)











  11/25/19 Range/Units





  05:32 


 


Sodium  136 L  (137-145)  mmol/L


 


Potassium  3.2 L  (3.5-5.1)  mmol/L


 


BUN  6 L  (9-20)  mg/dL


 


Creatinine  0.64 L  (0.66-1.25)  mg/dL








                      Microbiology - Last 24 Hours (Table)











 11/23/19 17:00 Gram Stain - Preliminary





 Neck Wound Culture - Preliminary





    Presumptive Staph aureus





    Beta Hemolytic Strep Group G


 


 11/22/19 16:10 Blood Culture - Preliminary





 Blood    No Growth after 48 hours














Assessment and Plan


Plan: 





1.  Sepsis most likely related to cellulitis left neck with possible abscess, 

chronic nonhealing wound left neck mass.  Continue cefepime and vancomycin.  

Consult with Dr. Kim appreciated.  CAT scan as above.  Consult added for 

interventional radiology for possible drainage of abscess.  





2.  Aggressive basal cell cancer of the skin primarily occurred in the left neck

with metastatic disease to the left neck lymph nodes and the lungs.  Patient is 

undergone palliative radiation.  Consult with Dr. Payne added.  





3.  Left jugular vein thrombosis, rule out Lemierre's syndrome.  Consult with 

vascular surgery





4.  Pyuria with urinary tract symptomatology area and await urine culture.  

Continue cefepime and vancomycin. 





5.  Radiation induced dysphagia and laryngitis.  Modified barium swallow 

negative.  





6.  Clinical dehydration.  Continue IV fluids.





7.  Autonomic dysfunction.  Continue IV hydration, monitor for orthostasis.





8.  CKD stage II.





9.  GI prophylaxis with PPI and DVT prophylaxis high risk, subcu Lovenox





10.  Generalized anxiety disorder, insomnia.  Xanax 0.25 mg at bedtime added.





CODE STATUS: Full code 





Discharge plan: home





Impression and plan of care have been directed as dictated by the signing 

physician.  Haley Lucas nurse practitioner acting as scribe for signing 

physician.

## 2019-11-25 NOTE — CT
EXAMINATION TYPE: CT neck chest w con

 

DATE OF EXAM: 11/25/2019

 

COMPARISON: August 30, 2019

 

HISTORY: left side neck wound, abscess post radiation treatment

 

CT DLP: 832.7 mGycm

Automated exposure control for dose reduction was used.

 

CONTRAST: 

CT scan of the chest is performed with IV Contrast, patient injected with 100 mL of Isovue 300.

 

FINDINGS:

 

LUNGS: Spiculated masses right upper lobe persist. Masses measure 2.4 and 2.2 cm respectively versus 
maximal measurement previously of 4.1 cm and 2.3 cm. Left basilar atelectasis.

 

MEDIASTINUM: There are no greater than 1 cm hilar or mediastinal lymph nodes.   No pericardial effusi
on is seen.  Thoracic aorta is of normal caliber. The heart is not enlarged.

 

UPPER ABDOMEN:  No significant abnormality appreciated.

 

OTHER:  No additional significant abnormality is seen.

 

IMPRESSION:  Spiculated mass right upper lobe. Dominant mass appears to be slightly smaller in size.

 

EXAMINATION TYPE: CT neck chest w con

 

DATE OF EXAM: 11/25/2019

 

COMPARISON: None

 

HISTORY: left side neck wound, abscess post radiation treatment

 

CT DLP: 832.7 mGycm

 

CONTRAST: 

CT scan of the neck is performed with IV Contrast, patient injected with 100 mL of Isovue 300.

 

Contrast enhanced CT of the neck was performed from the skull base through the lung apices.

 

At the site of clinical concern which corresponds to the left neck there is a 4.7 x 5.2 x 4.6 cm thic
k walled collection noted consistent with abscess. Abscess extends to the skin surface. There is asso
ciated skin thickening. Noted within the lateral supraclavicular region there is a partially imaged h
ypoattenuating structure which may reflect lymph node versus additional small abscess.

 

AIRWAY:   The supraglottic, glottic, and subglottic portions of the airway appear patent and free of 
mass.

 

SALIVARY GLANDS:  The submandibular and parotid glands are free of mass or inflammatory process.

 

THYROID GLAND:  No nodules or masses seen.

 

LYMPH NODES: Conglomerate mass within the left internal jugular chain measuring an estimated 3.7 x 2.
8 cm. This may reflect conglomerate adenopathy. Normal-appearing jugular vein is not identified at th
is level and thrombosed jugular vein is difficult to exclude. There appears to be tumoral infiltratio
n into the left C3 transverse process. Multiple enlarged lymph nodes within the anterior and posterio
r triangles.

 

 

IMPRESSION:

1. At the site of clinical concern there is evidence of abscess. Small multifocal component difficult
 to exclude. Associated cellulitis.

2. Left-sided retrocaval soft tissue mass as discussed above may reflect conglomerate adenopathy. Inf
iltration into the left C3 transverse process.

3. Thrombosed left jugular vein is difficult to exclude.

## 2019-11-25 NOTE — FL
EXAMINATION TYPE: FL barium swallow w video

 

DATE OF EXAM: 11/25/2019

 

COMPARISON: NONE

 

HISTORY: Radiation, food sticking, dysphasia

 

TECHNIQUE: Fluoroscopy.

 

FINDINGS:  Fluoroscopic guidance was provided for the procedure performed in conjunction with the [facility] pathology department.  Please see complete report forthcoming from the Speech Pathology departmen
t.  Various consistencies from thin liquid to solids were administered.

 

Fluoroscopy time 1 minute 27 seconds.

Number of images: 0.

 

No aspiration or penetration was evident.

No significant pooling was observed in the vallecula. Very minimal pooling which spontaneously cleare
d with swallowing was observed.

There was normal propulsion of the bolus.

 

IMPRESSION: 

1. Essentially unremarkable modified barium swallow

## 2019-11-25 NOTE — US
EXAMINATION TYPE: US venous doppler duplex UE LT

 

DATE OF EXAM: 11/25/2019

 

COMPARISON: CT 2019

 

CLINICAL HISTORY: r/o left internal jugular thrombosis.

 

SIDE PERFORMED: Left

 

 

 

Left Arm: Appears negative for DVT 

 

 

 

IMPRESSION: 

No evidence of deep venous thrombosis in the left arm. No evidence of jugular vein thrombosis.

## 2019-11-26 VITALS — RESPIRATION RATE: 16 BRPM

## 2019-11-26 LAB
ANION GAP SERPL CALC-SCNC: 10 MMOL/L
APTT BLD: 43.9 SEC (ref 22–30)
BASOPHILS # BLD AUTO: 0.1 K/UL (ref 0–0.2)
BASOPHILS NFR BLD AUTO: 1 %
BUN SERPL-SCNC: 6 MG/DL (ref 9–20)
CALCIUM SPEC-MCNC: 8.7 MG/DL (ref 8.4–10.2)
CHLORIDE SERPL-SCNC: 96 MMOL/L (ref 98–107)
CO2 SERPL-SCNC: 30 MMOL/L (ref 22–30)
EOSINOPHIL # BLD AUTO: 0 K/UL (ref 0–0.7)
EOSINOPHIL NFR BLD AUTO: 0 %
ERYTHROCYTE [DISTWIDTH] IN BLOOD BY AUTOMATED COUNT: 4.3 M/UL (ref 4.3–5.9)
ERYTHROCYTE [DISTWIDTH] IN BLOOD: 16.5 % (ref 11.5–15.5)
GLUCOSE SERPL-MCNC: 111 MG/DL (ref 74–99)
HCT VFR BLD AUTO: 32 % (ref 39–53)
HGB BLD-MCNC: 9.7 GM/DL (ref 13–17.5)
INR PPP: 1 (ref ?–1.2)
LYMPHOCYTES # SPEC AUTO: 0.6 K/UL (ref 1–4.8)
LYMPHOCYTES NFR SPEC AUTO: 9 %
MCH RBC QN AUTO: 22.6 PG (ref 25–35)
MCHC RBC AUTO-ENTMCNC: 30.3 G/DL (ref 31–37)
MCV RBC AUTO: 74.4 FL (ref 80–100)
MONOCYTES # BLD AUTO: 0.5 K/UL (ref 0–1)
MONOCYTES NFR BLD AUTO: 7 %
NEUTROPHILS # BLD AUTO: 5.6 K/UL (ref 1.3–7.7)
NEUTROPHILS NFR BLD AUTO: 80 %
PLATELET # BLD AUTO: 343 K/UL (ref 150–450)
POTASSIUM SERPL-SCNC: 3 MMOL/L (ref 3.5–5.1)
PT BLD: 10.3 SEC (ref 9–12)
SODIUM SERPL-SCNC: 136 MMOL/L (ref 137–145)
WBC # BLD AUTO: 7 K/UL (ref 3.8–10.6)

## 2019-11-26 PROCEDURE — 0J953ZX DRAINAGE OF LEFT NECK SUBCUTANEOUS TISSUE AND FASCIA, PERCUTANEOUS APPROACH, DIAGNOSTIC: ICD-10-PCS

## 2019-11-26 RX ADMIN — GUAIFENESIN AND DEXTROMETHORPHAN PRN ML: 100; 10 SYRUP ORAL at 16:06

## 2019-11-26 RX ADMIN — CEFEPIME HYDROCHLORIDE SCH MLS/HR: 2 INJECTION, POWDER, FOR SOLUTION INTRAVENOUS at 08:23

## 2019-11-26 RX ADMIN — CEFAZOLIN SCH: 330 INJECTION, POWDER, FOR SOLUTION INTRAMUSCULAR; INTRAVENOUS at 15:16

## 2019-11-26 RX ADMIN — METOPROLOL TARTRATE SCH MG: 25 TABLET, FILM COATED ORAL at 20:59

## 2019-11-26 RX ADMIN — CEFAZOLIN SCH: 330 INJECTION, POWDER, FOR SOLUTION INTRAMUSCULAR; INTRAVENOUS at 20:52

## 2019-11-26 RX ADMIN — ACETAMINOPHEN PRN MG: 325 TABLET, FILM COATED ORAL at 09:15

## 2019-11-26 RX ADMIN — CEFAZOLIN SCH: 330 INJECTION, POWDER, FOR SOLUTION INTRAMUSCULAR; INTRAVENOUS at 05:47

## 2019-11-26 RX ADMIN — SODIUM CHLORIDE SCH: 9 INJECTION, SOLUTION INTRAVENOUS at 05:47

## 2019-11-26 RX ADMIN — PANTOPRAZOLE SODIUM SCH MG: 40 TABLET, DELAYED RELEASE ORAL at 06:49

## 2019-11-26 RX ADMIN — SODIUM CHLORIDE SCH MLS/HR: 9 INJECTION, SOLUTION INTRAVENOUS at 08:23

## 2019-11-26 RX ADMIN — Medication SCH MG: at 23:23

## 2019-11-26 RX ADMIN — POTASSIUM CHLORIDE SCH MEQ: 20 TABLET, EXTENDED RELEASE ORAL at 09:15

## 2019-11-26 RX ADMIN — POTASSIUM CHLORIDE SCH MEQ: 20 TABLET, EXTENDED RELEASE ORAL at 08:23

## 2019-11-26 RX ADMIN — ACETAMINOPHEN PRN MG: 325 TABLET, FILM COATED ORAL at 23:22

## 2019-11-26 NOTE — US
Ultrasound aspiration of left neck mass

 

HISTORY: Left neck mass

 

Correlation CT neck 11/25/2019

 

Maximal barrier technique was utilized. Ultrasound used with sterile technique.

 

Following informed consent the skin overlying the patient's left neck mass was prepped and draped. 21
-gauge needle was advanced under ultrasound guidance into the lesion and approximately 1 cc of serous
 fluid was aspirated. Needle was removed and hemostasis achieved. No immediate complication. Patient 
remained in stable condition.

 

IMPRESSION: Status post ultrasound-guided aspiration left neck mass. Microbiology studies are pending
. [facility].

## 2019-11-26 NOTE — P.PN
Subjective


Progress Note Date: 11/26/19





This is a 69-year-old  male patient of Dr. Mata and Dr. Bundy.

 He does not follow with a cardiologist.  He has a past medical history 

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 

metastatic disease to the neck and lungs with plan for palliative radiation t

herapy that was completed 11/22/2019 after 10 mL of radiation treatment.  

Patient comes in with nausea and vomiting, dysuria, urinary frequency and 

weakness.  And was admitted for acute renal failure, acute UTI, and 

dehydration..  Patient had a recent hospitalization on October 5 at which time 

he was treated for dizziness syncopal episode hypotension secondary to 

lisinopril and hydrochlorothiazide and hydrochlorothiazide was discontinued at 

the time of discharge.  He was again admitted October 31 to November 3, again 

secondary to dehydration, with presyncope and labile blood pressures.





Notes reviewed from oncology, showed a bronchoscopy pathology showing basal cell

carcinoma.  CAT scan of the chest 09/26/2019, revealed 2 masses in the right 

upper lobe and enlarged left supraclavicular left lower neck lymph node mass 

measuring 4.6 cm, she has recurrent basal cell carcinoma of the face and neck.  

MRI of the brain 10/07/2019 negative for metastasis, PET/CT 10/21/2019, showed 

hypermetabolic uptake in the right upper lobe in the left neck.





Patient presents emergency room with the above symptoms including weakness, 

followed her urine, vomiting weakness, and fever patient was influenza negative,

patient has dysphagia to solid foods, urinalysis in the emergency room was 

positive for pyuria WBC count of 13.6 uptake acid at 2.3 positive for ketones, 

chest x-ray similar abnormal density right upper lobe patient has sores in the 

left neck, related to basal cell, status post radiation treatment, Dr. Roderick Yost to see on 11/26/2019 surgeon.  This was ENT surgeon patient was to see 

at the being of his treatment course.  Patient delayed evaluation as he was not 

intending to have surgery.


 





11/24: Patient is somehow slightly better, there is no aspirate events, still 

febrile, blood cultures are still currently pending, wound cultures from the 

neck is currently pending, infectious disease following no diarrhea no 

lightheadedness no chest pain








11/25: Patient was seen in consultation by Dr. Kim with plan to continue 

cefepime and vancomycin, local wound care with medihoney.  Modified barium 

swallow was unremarkable.  CAT scan of the neck revealed concern for abscess.  S

mall multifocal component difficult to exclude.  Associated cellulitis.  Left-

sided retrocaval soft tissue mass may reflect conglomerate adenopathy.  

Infiltration to the left C3 transverse process.  Thrombosed left jugular vein is

difficult to exclude.  Case discussed with Dr. Taylor and Dr. Payne by Dr. Helm, patient has had a cyst in the lymph node left neck since he started 

treatment.  Unsure if this possible abscess is the same.  Dr. Payne consult 

added.  We will also add vascular consult for the thrombus in the left jugular 

vein.  The patient is requesting Xanax for sleep at bedtime which will be added.

 Patient is complaining of sore throat which she thinks is related to radiation.

 His last radiation treatment was Wednesday.  Temperature max 100.5, heart rate 

98, blood pressure 149/80, pulse ox 96% on room air.





11/26: Dr. Kim has transition antibiotics to Kefzol.  Wound cultures positive 

for MSSA and beta-hemolytic strep.  Blood cultures no growth at 72 hours.  

Ultrasound venous Doppler showed no evidence of DVT in the left arm and no 

evidence of jugular vein thrombosis.  Heparin will be discontinued.  Patient is 

scheduled for aspiration of the abscess/cyst today with interventional 

radiology.  He has been afebrile greater than 24 hours, blood pressure 141/88, 

heart rate 84, pulse ox 90% on room air.  Repeat lab work reveals hemoglobin of 

9.7, white count 7, potassium 3.0 will be replaced, creatinine 0.61, blood sugar

111.











Review Of Systems:


Constitutional: Reports fever, no chills, no night sweats.  No weight change.  

No weakness, fatigue or lethargy.  Reports daytime sleepiness.


EENT: No headache.  No blurred vision or double vision, no loss of vision.  No 

loss of Hearing, no ringing in the ears, no dizziness.  No nasal drainage or 

congestion.  No epistaxis.  Reports sore throat.


Lungs: No shortness of breath, cough, no sputum production.  No wheezing.


Cardiovascular: No chest pain, no lower extremity edema.  No palpitations.  No 

paroxysmal nocturnal dyspnea.  No orthopnea.  No lightheadedness or dizziness.  

No syncopal episodes.


Abdominal: No abdominal pain.  No nausea, vomiting.  No diarrhea.  No 

constipation.  No bloody or tarry stools..  No loss of appetite.


Genitourinary: No dysuria, increased frequency, urgency.  No urinary retention.


Musculoskeletal: No myalgias.  No muscle weakness, no gait dysfunction, no 

frequent falls.  No back pain.  No neck pain.


Integumentary: Reports wounds, no lesions.  No rash or pruritus.  No unusual 

bruising.  No change in hair or nails.


Neurologic: No aphasia. No facial droop. No change in mentation. No head injury.

No headache. No paralysis. No paresthesia.


Psychiatric: No depression.  No anxiety.  No mood swings.


Endocrine: No abnormal blood sugars.  No weight change.  No excessive sweating 

or thirst.








Objective





- Vital Signs


Vital signs: 


                                   Vital Signs











Temp  97.9 F   11/26/19 08:00


 


Pulse  109 H  11/26/19 08:00


 


Resp  18   11/26/19 08:00


 


BP  137/78   11/26/19 08:00


 


Pulse Ox  98   11/26/19 08:00








                                 Intake & Output











 11/25/19 11/26/19 11/26/19





 18:59 06:59 18:59


 


Intake Total 230 351.882 240


 


Output Total 700 2850 


 


Balance -470 -7988.118 240


 


Weight  78.1 kg 


 


Intake:   


 


  Intake, IV Titration  351.882 





  Amount   


 


    Cefepime 2 gm In Sodium  100 





    Chloride 0.9% 100 ml @   





    200 mls/hr IVPB Q8HR TK   





    Rx#:[id number]   


 


    Heparin Sod,Pork in 0.45%  126.882 





    NaCl 25,000 unit In 0.45   





    % NaCl 1 250ml.bag @ 18   





    UNITS/KG/HR 14.364 mls/hr   





    IV .[id number] TK Rx#:   





    [id number]   


 


    Vancomycin 1,250 mg In  125 





    Sodium Chloride 0.9% 250   





    ml @ 125 mls/hr IVPB Q8H   





    TK Rx#:[id number]   


 


  Oral 230  240


 


Output:   


 


  Urine 700 2850 


 


Other:   


 


  Voiding Method Urinal Urinal Urinal


 


  # Voids  1 














- Exam





General appearance: Present: cooperative, no acute distress, resting in chair, 

wife is at bedside





- EENT


Eyes: Present: anicteric sclerae, EOMI, PERRLA, dentition normal


ENT: Present: NA/AT, normal oropharynx





- Neck


Details: 





2 lesions measuring 3 cm x 2 cm with mucoid discharge inferior to the neck near 

the scapular area, more located posteriorly, and 2 x 2 centimeter in the upper 

neck, no central necrosis noted


Neck: Present: lymphadenopathy





- Respiratory


Respiratory: bilateral: CTA, negative: diminished, dullness, rales





- Cardiovascular


Rhythm: regular


Heart sounds: normal: S1, S2


Abnormal Heart Sounds: Absent: systolic murmur, diastolic murmur, rub, S3 

Gallop, S4 Gallop, click, other





- Gastrointestinal


General gastrointestinal: Present: normal bowel sounds, soft





- Integumentary


Integumentary: Present: decreased turgor, normal





- Neurologic


Neurologic: Present: CNII-XII intact





- Musculoskeletal


Musculoskeletal: Present: gait normal, strength equal bilaterally





- Psychiatric


Psychiatric: Present: A&O x's 3, appropriate affect








- Labs


CBC & Chem 7: 


                                 11/26/19 06:30





                                 11/26/19 06:30


Labs: 


                  Abnormal Lab Results - Last 24 Hours (Table)











  11/26/19 11/26/19 11/26/19 Range/Units





  03:05 06:30 06:30 


 


Hgb    9.7 L  (13.0-17.5)  gm/dL


 


Hct    32.0 L  (39.0-53.0)  %


 


MCV    74.4 L  (80.0-100.0)  fL


 


MCH    22.6 L  (25.0-35.0)  pg


 


MCHC    30.3 L  (31.0-37.0)  g/dL


 


RDW    16.5 H  (11.5-15.5)  %


 


Lymphocytes #    0.6 L  (1.0-4.8)  k/uL


 


APTT  43.9 H    (22.0-30.0)  sec


 


Sodium   136 L   (137-145)  mmol/L


 


Potassium   3.0 L   (3.5-5.1)  mmol/L


 


Chloride   96 L   ()  mmol/L


 


BUN   6 L   (9-20)  mg/dL


 


Creatinine   0.61 L   (0.66-1.25)  mg/dL


 


Glucose   111 H   (74-99)  mg/dL








                      Microbiology - Last 24 Hours (Table)











 11/22/19 16:10 Blood Culture - Preliminary





 Blood    No Growth after 72 hours


 


 11/23/19 17:00 Gram Stain - Final





 Neck Wound Culture - Final





    Staphylococcus aureus





    Beta Hemolytic Strep Group G














Assessment and Plan


Plan: 





1.  Sepsis most likely related to cellulitis left neck with possible abscess, 

chronic nonhealing wound left neck mass.  Antibiotics transitioned to Kefzol.  

Consult with Dr. Kim appreciated.  CAT scan as above.  Consult added for 

interventional radiology for possible drainage of abscess.  





2.  Aggressive basal cell cancer of the skin primarily occurred in the left neck

with metastatic disease to the left neck lymph nodes and the lungs.  Patient is 

undergone palliative radiation.  Consult with Dr. Payne added.  





3.  Left jugular vein thrombosis, ruled out.  Consult with vascular surgery 

appreciated.





4.  Pyuria without UTI. 





5.  Radiation induced dysphagia and laryngitis.  Modified barium swallow 

negative.  





6.  Clinical dehydration.  Continue IV fluids.





7.  Autonomic dysfunction.  Continue IV hydration, monitor for orthostasis.





8.  CKD stage II.





9.  GI prophylaxis with PPI and DVT prophylaxis high risk, subcu Lovenox





10.  Generalized anxiety disorder, insomnia.  Xanax 0.25 mg at bedtime added.





CODE STATUS: Full code 





Discharge plan: home





Impression and plan of care have been directed as dictated by the signing 

physician.  Haley Lucas nurse practitioner acting as scribe for signing 

physician.

## 2019-11-26 NOTE — PN
PROGRESS NOTE



DATE OF SERVICE:

11/26/2019.



REASON FOR FOLLOWUP:

Left side neck wound, cellulitis and question of abscess.



INTERVAL HISTORY:

The patient is currently afebrile, has been breathing comfortably.  The pain and

discomfort to the left side of the neck has improved.  Very minimal drainage.  The

patient is status post radiology drainage of this area.  Cultures currently pending.

Denies any chest pain, cough.  No abdominal pain.  No diarrhea.



PHYSICAL EXAMINATION:

Blood pressure is 117/81 with a pulse of 91, temperature 99.2.  He is 96% on room air.

General description is an elderly male lying in bed in no distress.

HEENT examination:  Neck area swelling and redness has decreased.

LUNGS: Unlabored breathing, clear to auscultation anteriorly.

CARDIOVASCULAR: Heart S1, S2.  Regular rate and rhythm.

ABDOMEN:  Soft, no tenderness.



LABS:

Hemoglobin is 9.7, white count 7.0, BUN of 6, creatinine 0.61.



DIAGNOSTIC IMPRESSION AND PLAN:

Patient with left neck wound with secondary cellulitis, concern for possible abscess

status post drainage.  Patient culture with MSSA and beta-hemolytic strep.  Patient

currently on cefazolin, will try to arrange for IV antibiotic on discharge if possible.

Otherwise switch to oral on discharge. Waiting for the culture and drainage to

finalize.  Wife at the bedside. Questions were answered.





MMODL / IJN: [id number] / Job#: [id number]

## 2019-11-27 VITALS — SYSTOLIC BLOOD PRESSURE: 158 MMHG | HEART RATE: 103 BPM | DIASTOLIC BLOOD PRESSURE: 86 MMHG | TEMPERATURE: 98.9 F

## 2019-11-27 LAB
MAGNESIUM SPEC-SCNC: 2.2 MG/DL (ref 1.6–2.3)
POTASSIUM SERPL-SCNC: 3.2 MMOL/L (ref 3.5–5.1)

## 2019-11-27 PROCEDURE — 05HB33Z INSERTION OF INFUSION DEVICE INTO RIGHT BASILIC VEIN, PERCUTANEOUS APPROACH: ICD-10-PCS

## 2019-11-27 RX ADMIN — POTASSIUM CHLORIDE SCH MEQ: 20 TABLET, EXTENDED RELEASE ORAL at 14:27

## 2019-11-27 RX ADMIN — GUAIFENESIN AND DEXTROMETHORPHAN PRN ML: 100; 10 SYRUP ORAL at 10:08

## 2019-11-27 RX ADMIN — PANTOPRAZOLE SODIUM SCH MG: 40 TABLET, DELAYED RELEASE ORAL at 06:29

## 2019-11-27 RX ADMIN — ACETAMINOPHEN PRN MG: 325 TABLET, FILM COATED ORAL at 14:26

## 2019-11-27 RX ADMIN — CEFAZOLIN SCH MLS/HR: 330 INJECTION, POWDER, FOR SOLUTION INTRAMUSCULAR; INTRAVENOUS at 06:29

## 2019-11-27 RX ADMIN — GUAIFENESIN AND DEXTROMETHORPHAN PRN ML: 100; 10 SYRUP ORAL at 04:42

## 2019-11-27 RX ADMIN — ACETAMINOPHEN PRN MG: 325 TABLET, FILM COATED ORAL at 08:18

## 2019-11-27 RX ADMIN — POTASSIUM CHLORIDE SCH MEQ: 20 TABLET, EXTENDED RELEASE ORAL at 10:10

## 2019-11-27 NOTE — P.DS
Providers


Date of admission: 


11/22/19 17:25





Expected date of discharge: 11/27/19


Attending physician: 


Kylie Ovalle





Consults: 





                                        





11/23/19 15:43


Consult Physician Routine 


   Consulting Provider: Will Kim


   Consult Reason/Comments: neck wound/sirs, uti


   Do you want consulting provider notified?: Yes





11/27/19 10:03


Consult Physician Routine 


   Consulting Provider: Joshua Germain


   Consult Reason/Comments: basal cell cancer


   Do you want consulting provider notified?: Yes











Primary care physician: 


Mina Mata





[facility] Course: 





This is a 69-year-old  male patient of Dr. Mata and Dr. Bundy.

 He does not follow with a cardiologist.  He has a past medical history 

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 

metastatic disease to the neck and lungs with plan for palliative radiation 

therapy that was completed 11/22/2019 after 10 mL of radiation treatment.  

Patient comes in with nausea and vomiting, dysuria, urinary frequency and wea

kness.  And was admitted for acute renal failure, acute UTI, and dehydration..  

Patient had a recent hospitalization on October 5 at which time he was treated 

for dizziness syncopal episode hypotension secondary to lisinopril and 

hydrochlorothiazide and hydrochlorothiazide was discontinued at the time of 

discharge.  He was again admitted October 31 to November 3, again secondary to 

dehydration, with presyncope and labile blood pressures.





Notes reviewed from oncology, showed a bronchoscopy pathology showing basal cell

carcinoma.  CAT scan of the chest 09/26/2019, revealed 2 masses in the right 

upper lobe and enlarged left supraclavicular left lower neck lymph node mass 

measuring 4.6 cm, she has recurrent basal cell carcinoma of the face and neck.  

MRI of the brain 10/07/2019 negative for metastasis, PET/CT 10/21/2019, showed 

hypermetabolic uptake in the right upper lobe in the left neck.





Patient presents emergency room with the above symptoms including weakness, 

followed her urine, vomiting weakness, and fever patient was influenza negative,

patient has dysphagia to solid foods, urinalysis in the emergency room was 

positive for pyuria WBC count of 13.6 uptake acid at 2.3 positive for ketones, 

chest x-ray similar abnormal density right upper lobe patient has sores in the 

left neck, related to basal cell, status post radiation treatment, Dr. Roderick Yost to see on 11/26/2019 surgeon.  This was ENT surgeon patient was to see 

at the being of his treatment course.  Patient delayed evaluation as he was not 

intending to have surgery.


 





11/24: Patient is somehow slightly better, there is no aspirate events, still 

febrile, blood cultures are still currently pending, wound cultures from the 

neck is currently pending, infectious disease following no diarrhea no 

lightheadedness no chest pain








11/25: Patient was seen in consultation by Dr. Kim with plan to continue 

cefepime and vancomycin, local wound care with medihoney.  Modified barium 

swallow was unremarkable.  CAT scan of the neck revealed concern for abscess.  

Small multifocal component difficult to exclude.  Associated cellulitis.  Left-

sided retrocaval soft tissue mass may reflect conglomerate adenopathy.  I

nfiltration to the left C3 transverse process.  Thrombosed left jugular vein is 

difficult to exclude.  Case discussed with Dr. Taylor and Dr. Payne by Dr. Helm, patient has had a cyst in the lymph node left neck since he started 

treatment.  Unsure if this possible abscess is the same.  Dr. Payne consult 

added.  We will also add vascular consult for the thrombus in the left jugular 

vein.  The patient is requesting Xanax for sleep at bedtime which will be added.

 Patient is complaining of sore throat which she thinks is related to radiation.

 His last radiation treatment was Wednesday.  Temperature max 100.5, heart rate 

98, blood pressure 149/80, pulse ox 96% on room air.





11/26: Dr. Kim has transition antibiotics to Kefzol.  Wound cultures positive 

for MSSA and beta-hemolytic strep.  Blood cultures no growth at 72 hours.  

Ultrasound venous Doppler showed no evidence of DVT in the left arm and no 

evidence of jugular vein thrombosis.  Heparin will be discontinued.  Patient is 

scheduled for aspiration of the abscess/cyst today with interventional 

radiology.  He has been afebrile greater than 24 hours, blood pressure 141/88, 

heart rate 84, pulse ox 90% on room air.  Repeat lab work reveals hemoglobin of 

9.7, white count 7, potassium 3.0 will be replaced, creatinine 0.61, blood sugar

111.





11/27: Patient underwent ultrasound-guided aspiration of left neck mass.  

Patient was afebrile for the past 24 hours, heart rate 103, blood pressure 

158/86, pulse ox 97% on room air.  A repeat potassium is 3.2, magnesium 2.2.  

The patient is continued on Kefzol and Dr. Kim is planning for IV kefzol and 

midline will be placed.  Patient would benefit from hospital bed at home as he 

develops orthopnea and requires head of the bed elevated at least 30 due to the

mass in his neck causing compression on his airway.  We'll make arrangements for

patient be discharged later today.  Recommended the patient switch to ensure 

clear as this will be easier for him to swallow.  The patient will be discharged

home today once all arrangements are completed.











Discharge diagnoses:


1.  Sepsis most likely related to cellulitis left neck with abscess, chronic 

nonhealing wound left neck mass, status post aspiration. 


2.  Aggressive basal cell cancer of the skin primarily occurred in the left neck

with metastatic disease to the left neck lymph nodes and the lungs. 


3.  Left jugular vein thrombosis, ruled out.  


4.  Pyuria without UTI. 


5.  Radiation induced dysphagia and laryngitis.  Modified barium swallow 

negative.  


6.  Clinical dehydration. 


7.  Autonomic dysfunction.  


8.  CKD stage II.


9.  Generalized anxiety disorder, insomnia.  Xanax 0.25 mg at bedtime added.











Discharge plan: home with [facility]





Impression and plan of care have been directed as dictated by the signing 

physician.  Haley Lucas nurse practitioner acting as scribe for signing 

physician.





Patient Condition at Discharge: Good





Plan - Discharge Summary


Discharge Rx Participant: No


New Discharge Prescriptions: 


New


   ceFAZolin [Kefzol] 2,000 mg IVPB Q8HR #42 vial


   guaiFENesin--10MG/5ML [Robitussin DM] 10 ml PO Q6H PRN  cup


     PRN Reason: Cough


   Potassium Chloride ER [K-Dur 20] 20 meq PO DAILY #30 tab





Continue


   Acetaminophen [Tylenol Arthritis] 650 mg PO BID PRN


     PRN Reason: Pain


   hydrALAZINE HCL [Apresoline] 25 mg PO TID #90 tab


   Melatonin 10 mg PO HS #30 tablet.er


   Metoprolol Tartrate [Lopressor] 12.5 mg PO HS


Discharge Medication List





Acetaminophen [Tylenol Arthritis] 650 mg PO BID PRN 10/31/19 [History]


Melatonin 10 mg PO HS #30 tablet.er 11/03/19 [Rx]


hydrALAZINE HCL [Apresoline] 25 mg PO TID #90 tab 11/03/19 [Rx]


Metoprolol Tartrate [Lopressor] 12.5 mg PO HS 11/22/19 [History]


Potassium Chloride ER [K-Dur 20] 20 meq PO DAILY #30 tab 11/27/19 [Rx]


ceFAZolin [Kefzol] 2,000 mg IVPB Q8HR #42 vial 11/27/19 [Rx]


guaiFENesin--10MG/5ML [Robitussin DM] 10 ml PO Q6H PRN  cup 11/27/19 [Rx]








Follow up Appointment(s)/Referral(s): 


[facility], [NON-STAFF] - 


[facility]Infusion [NON-STAFF] - 


Mina Mata DO [Primary Care Provider] - 1 Week


(Please call and follow up with your primary provider within one week of being 

discharged. 





Blood work will need to be repeated weekly by your home care nurse. )


Will Kim MD [STAFF PHYSICIAN] - 1 Week (Office will call with a follow up 

appointment. They have been notified of your discharge.)


Ambulatory/Diagnostic Orders: 


Basic Metabolic Panel [LAB.AMB] Location: None Selected


Complete Blood Count w/diff [LAB.AMB] Location: None Selected


Patient Instructions/Handouts:  Wound Infection (DC), Hypokalemia (DC), How to 

Care for Your Midline Catheter (DC)


Discharge Disposition: HOME WITH HOME HEALTH SERVICES

## 2019-11-27 NOTE — PN
PROGRESS NOTE



DATE OF SERVICE:

11/27/2019



REASON FOR FOLLOWUP:

Left-sided neck wound and cellulitis, possible abscess.



INTERVAL HISTORY:

The patient is currently afebrile.  The patient is breathing comfortably.  The patient

denies having any chest pain or cough.  No nausea, no vomiting or pain to the left side

of the neck area.



PHYSICAL EXAMINATION:

Blood pressure is 158/86, pulse of 103, temperature 98.9.  He is 97% on room air.

General description is an elderly male lying in bed in no distress.

RESPIRATORY SYSTEM: Unlabored breathing. Clear to auscultation anteriorly.

HEART: S1, S2.  Regular rate and rhythm.

ABDOMEN: Soft. No tenderness.



LABS:

Hemoglobin 9.7, white count 7.0, BUN of 6, creatinine 0.61.



DIAGNOSTIC IMPRESSION AND PLAN:

Patient with a left-sided neck small wound with secondary cellulitis and underlying

abscess;  culture positive for methicillin-susceptible Staphylococcus aeruginosa, beta-

hemolytic strep, status post surgical aspirate. Those cultures are currently pending.

The patient has overall clinical improvement on cefazolin. Hence continue with

cefazolin 2 grams q.8 hours for 2 weeks. Local wound care is ordered.  Follow up in the

office in one week. Family at the bedside.  Their questions and concerns were answered.





MMODL / IJN: [id number] / Job#: [id number]

## 2019-11-27 NOTE — P.PN
Subjective


Progress Note Date: 11/27/19





This is a 69-year-old  male patient of Dr. Mata and Dr. Bundy.

 He does not follow with a cardiologist.  He has a past medical history 

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 

metastatic disease to the neck and lungs with plan for palliative radiation t

herapy that was completed 11/22/2019 after 10 mL of radiation treatment.  

Patient comes in with nausea and vomiting, dysuria, urinary frequency and 

weakness.  And was admitted for acute renal failure, acute UTI, and 

dehydration..  Patient had a recent hospitalization on October 5 at which time 

he was treated for dizziness syncopal episode hypotension secondary to 

lisinopril and hydrochlorothiazide and hydrochlorothiazide was discontinued at 

the time of discharge.  He was again admitted October 31 to November 3, again 

secondary to dehydration, with presyncope and labile blood pressures.





Notes reviewed from oncology, showed a bronchoscopy pathology showing basal cell

carcinoma.  CAT scan of the chest 09/26/2019, revealed 2 masses in the right 

upper lobe and enlarged left supraclavicular left lower neck lymph node mass 

measuring 4.6 cm, she has recurrent basal cell carcinoma of the face and neck.  

MRI of the brain 10/07/2019 negative for metastasis, PET/CT 10/21/2019, showed 

hypermetabolic uptake in the right upper lobe in the left neck.





Patient presents emergency room with the above symptoms including weakness, 

followed her urine, vomiting weakness, and fever patient was influenza negative,

patient has dysphagia to solid foods, urinalysis in the emergency room was 

positive for pyuria WBC count of 13.6 uptake acid at 2.3 positive for ketones, 

chest x-ray similar abnormal density right upper lobe patient has sores in the 

left neck, related to basal cell, status post radiation treatment, Dr. Roderick Yost to see on 11/26/2019 surgeon.  This was ENT surgeon patient was to see 

at the being of his treatment course.  Patient delayed evaluation as he was not 

intending to have surgery.


 





11/24: Patient is somehow slightly better, there is no aspirate events, still 

febrile, blood cultures are still currently pending, wound cultures from the 

neck is currently pending, infectious disease following no diarrhea no 

lightheadedness no chest pain








11/25: Patient was seen in consultation by Dr. Kim with plan to continue 

cefepime and vancomycin, local wound care with medihoney.  Modified barium 

swallow was unremarkable.  CAT scan of the neck revealed concern for abscess.  S

mall multifocal component difficult to exclude.  Associated cellulitis.  Left-

sided retrocaval soft tissue mass may reflect conglomerate adenopathy.  

Infiltration to the left C3 transverse process.  Thrombosed left jugular vein is

difficult to exclude.  Case discussed with Dr. Taylor and Dr. Payne by Dr. Helm, patient has had a cyst in the lymph node left neck since he started 

treatment.  Unsure if this possible abscess is the same.  Dr. Payne consult 

added.  We will also add vascular consult for the thrombus in the left jugular 

vein.  The patient is requesting Xanax for sleep at bedtime which will be added.

 Patient is complaining of sore throat which she thinks is related to radiation.

 His last radiation treatment was Wednesday.  Temperature max 100.5, heart rate 

98, blood pressure 149/80, pulse ox 96% on room air.





11/26: Dr. Kim has transition antibiotics to Kefzol.  Wound cultures positive 

for MSSA and beta-hemolytic strep.  Blood cultures no growth at 72 hours.  

Ultrasound venous Doppler showed no evidence of DVT in the left arm and no 

evidence of jugular vein thrombosis.  Heparin will be discontinued.  Patient is 

scheduled for aspiration of the abscess/cyst today with interventional 

radiology.  He has been afebrile greater than 24 hours, blood pressure 141/88, 

heart rate 84, pulse ox 90% on room air.  Repeat lab work reveals hemoglobin of 

9.7, white count 7, potassium 3.0 will be replaced, creatinine 0.61, blood sugar

111.





11/27: Patient underwent ultrasound-guided aspiration of left neck mass.  

Patient was afebrile for the past 24 hours, heart rate 103, blood pressure 

158/86, pulse ox 97% on room air.  A repeat potassium is 3.2, magnesium 2.2.  

The patient is continued on Kefzol and Dr. Kim is planning for IV kefzol and 

midline will be placed.  Patient would benefit from hospital bed at home as he 

develops orthopnea and requires head of the bed elevated at least 30 due to the

mass in his neck causing compression on his airway.  We'll make arrangements for

patient be discharged later today.  Recommended the patient switch to ensure 

clear as this will be easier for him to swallow.











Review Of Systems:


Constitutional: Reports fever, no chills, no night sweats.  No weight change.  

No weakness, fatigue or lethargy.  Reports daytime sleepiness.


EENT: No headache.  No blurred vision or double vision, no loss of vision.  No 

loss of Hearing, no ringing in the ears, no dizziness.  No nasal drainage or 

congestion.  No epistaxis.  Reports sore throat.


Lungs: No shortness of breath, cough, no sputum production.  No wheezing.


Cardiovascular: No chest pain, no lower extremity edema.  No palpitations.  No 

paroxysmal nocturnal dyspnea.  No orthopnea.  No lightheadedness or dizziness.  

No syncopal episodes.


Abdominal: No abdominal pain.  No nausea, vomiting.  No diarrhea.  No 

constipation.  No bloody or tarry stools..  No loss of appetite.


Genitourinary: No dysuria, increased frequency, urgency.  No urinary retention.


Musculoskeletal: No myalgias.  No muscle weakness, no gait dysfunction, no 

frequent falls.  No back pain.  No neck pain.


Integumentary: Reports wounds, no lesions.  No rash or pruritus.  No unusual 

bruising.  No change in hair or nails.


Neurologic: No aphasia. No facial droop. No change in mentation. No head injury.

No headache. No paralysis. No paresthesia.


Psychiatric: No depression.  No anxiety.  No mood swings.


Endocrine: No abnormal blood sugars.  No weight change.  No excessive sweating 

or thirst.








Objective





- Vital Signs


Vital signs: 


                                   Vital Signs











Temp  98.9 F   11/27/19 08:14


 


Pulse  103 H  11/27/19 08:14


 


Resp  16   11/27/19 08:14


 


BP  158/86   11/27/19 08:14


 


Pulse Ox  97   11/27/19 08:14








                                 Intake & Output











 11/26/19 11/27/19 11/27/19





 18:59 06:59 18:59


 


Intake Total 610  


 


Output Total 200 1400 


 


Balance 410 -1400 


 


Weight 78.1 kg 78 kg 


 


Intake:   


 


  Intake, IV Titration 130  





  Amount   


 


    Sodium Chloride 0.9% 1, 80  





    000 ml @ 100 mls/hr IV .   





    Q10H TK Rx#:[id number]   


 


    ceFAZolin 2 gm In Sodium 50  





    Chloride 0.9% 50 ml @ 100   





    mls/hr IVPB Q8HR TK Rx#   





    :[id number]   


 


  Oral 480  


 


Output:   


 


  Urine 200 1400 


 


Other:   


 


  Voiding Method Urinal Urinal Urinal


 


  # Voids  1 














- Exam





General appearance: Present: cooperative, no acute distress, resting in chair, 

wife is at bedside





- EENT


Eyes: Present: anicteric sclerae, EOMI, PERRLA, dentition normal


ENT: Present: NA/AT, normal oropharynx





- Neck


Details: 





2 lesions measuring 3 cm x 2 cm with mucoid discharge inferior to the neck near 

the scapular area, more located posteriorly, and 2 x 2 centimeter in the upper 

neck, no central necrosis noted


Neck: Present: lymphadenopathy





- Respiratory


Respiratory: bilateral: CTA, negative: diminished, dullness, rales





- Cardiovascular


Rhythm: regular


Heart sounds: normal: S1, S2


Abnormal Heart Sounds: Absent: systolic murmur, diastolic murmur, rub, S3 

Gallop, S4 Gallop, click, other





- Gastrointestinal


General gastrointestinal: Present: normal bowel sounds, soft





- Integumentary


Integumentary: Present: decreased turgor, normal





- Neurologic


Neurologic: Present: CNII-XII intact





- Musculoskeletal


Musculoskeletal: Present: gait normal, strength equal bilaterally





- Psychiatric


Psychiatric: Present: A&O x's 3, appropriate affect








- Labs


CBC & Chem 7: 


                                 11/26/19 06:30





                                 11/27/19 05:49


Labs: 


                  Abnormal Lab Results - Last 24 Hours (Table)











  11/27/19 Range/Units





  05:49 


 


Potassium  3.2 L  (3.5-5.1)  mmol/L








                      Microbiology - Last 24 Hours (Table)











 11/25/19 12:58 Gram Stain - Preliminary





 Cyst Body Fluid Culture - Preliminary


 


 11/25/19 12:58 Fungal Culture - Preliminary





 Cyst 


 


 11/26/19 14:03 Anaerobic Culture - Preliminary





 Cyst 


 


 11/23/19 17:00 Anaerobic Culture - Preliminary





 Neck 


 


 11/22/19 16:10 Blood Culture - Preliminary





 Blood    No Growth after 96 hours














Assessment and Plan


Plan: 





1.  Sepsis most likely related to cellulitis left neck with abscess, chronic 

nonhealing wound left neck mass, status post aspiration.  Antibiotics 

transitioned to Kefzol.  Consult with Dr. Kim appreciated.  CAT scan as above.

  Kefzol 2 g every 8 hours for 2 weeks at home.  Midline ordered.





2.  Aggressive basal cell cancer of the skin primarily occurred in the left neck

with metastatic disease to the left neck lymph nodes and the lungs.  Patient has

undergone palliative radiation.  Consult with Dr. Payne added.  





3.  Left jugular vein thrombosis, ruled out.  Consult with vascular surgery 

appreciated.





4.  Pyuria without UTI. 





5.  Radiation induced dysphagia and laryngitis.  Modified barium swallow negat

lainey.  





6.  Clinical dehydration.  Continue IV fluids.





7.  Autonomic dysfunction.  Continue IV hydration, monitor for orthostasis.





8.  CKD stage II.





9.  GI prophylaxis with PPI and DVT prophylaxis high risk, subcu Lovenox





10.  Generalized anxiety disorder, insomnia.  Xanax 0.25 mg at bedtime added.





CODE STATUS: Full code 





Discharge plan: home with home care





Impression and plan of care have been directed as dictated by the signing 

physician.  Haley Lucas nurse practitioner acting as scribe for signing 

physician.

## 2019-12-02 NOTE — CDI
Documentation Clarification Form



Date:  12/2/2019

From:  Marylee K .Wilson, RHIT; Deena Guzman, 

Phone: If you have any questions about this query, please contact Deena Guzman, , at [phone] between 8 am and 5 pm.

MRN:  [id number]

Admit date: 11/22/2019

Patient Name:  Juan C Davila

Visit Number:  [id number]

Discharge Date:  11/27/2019



The clinical documentation specialists (CDS) and the [facility] Coding Staff appreciate
your assistance in clarifying documentation.  Please respond to the 
clarification below the line at the bottom and electronically sign.  Please 
note:  Queries are made a permanent part of the Health Record.   If you have any
question, please contact the author of this message via ITS.





The patient presented with sepsis, dysphagia, pyuria and confusion.   ED 
documentation is confusion related to metabolic encephalopathy from infection.  
This documentation is not carried throughout the medical record. 



History/Risk Factors: Skin cancer, HTN, neck abscess

Clinical Indicators: weakness, vomiting, fever.

Lab findings: WBC 13.6

Vital Signs:   temp 101, pulse 105, resp 16, /80

Treatment:  IV antibiotics.





In your professional opinion, can you please clarify?

Metabolic encephalopathy ruled in

Metabolic encephalopathy ruled out



   Other, please specify ________

   Unable to determine





(Last Revision: April 2018)

___________________________________________________________________________

metabolic encepahlopathy ruled in SIRS/ sepsis

MTDD

## 2020-02-21 ENCOUNTER — HOSPITAL ENCOUNTER (OUTPATIENT)
Dept: HOSPITAL 47 - RADCTMAIN | Age: 70
Discharge: HOME | End: 2020-02-21
Attending: INTERNAL MEDICINE
Payer: MEDICARE

## 2020-02-21 DIAGNOSIS — R59.9: ICD-10-CM

## 2020-02-21 DIAGNOSIS — L98.9: Primary | ICD-10-CM

## 2020-02-21 DIAGNOSIS — C44.91: ICD-10-CM

## 2020-02-21 LAB — BUN SERPL-SCNC: 10 MG/DL (ref 9–20)

## 2020-02-21 PROCEDURE — 71260 CT THORAX DX C+: CPT

## 2020-02-21 PROCEDURE — 70491 CT SOFT TISSUE NECK W/DYE: CPT

## 2020-02-21 PROCEDURE — 36415 COLL VENOUS BLD VENIPUNCTURE: CPT

## 2020-02-21 PROCEDURE — 84520 ASSAY OF UREA NITROGEN: CPT

## 2020-02-21 PROCEDURE — 82565 ASSAY OF CREATININE: CPT

## 2020-02-24 NOTE — CT
EXAMINATION TYPE: CT neck chest w con

 

DATE OF EXAM: 2/21/2020

 

COMPARISON: 11/25/2019

 

HISTORY: Basal Cell CA

 

CT DLP: 757.9 mGycm

 

CONTRAST: 

CT scan of the neck is performed with IV Contrast, patient injected with 100 mL of Isovue 300.

 

Contrast enhanced CT of the neck was performed from the skull base through the lung apices.

 

Again noted is a left neck collection which is  smaller than previous study and currently measures 2.
8 x 3.5 cm versus 5.2 x 4.6 cm. Open wound is noted with internal air identified. Exophytic skin mass
 is noted adjacent to the lower pole of the left parotid gland with associated skin thickening and me
asures 2.5 x 1.3 cm.

 

AIRWAY:   The supraglottic, glottic, and subglottic portions of the airway appear patent and free of 
mass.

 

SALIVARY GLANDS:  The submandibular and parotid glands are free of mass or inflammatory process.

 

THYROID GLAND:  No nodules or masses seen.

 

LYMPH NODES: Again noted is adenopathy within the left internal with lymph nodes measuring up to 1.2 
cm. carotid chain. Improved conglomerate mass at the level of the internal jugular chain measuring 1.
5 x 2.1 cm versus 3.7 x 2.8 cm previously.

 

LUNG APICES:  No nodule or mass is seen.

 

OTHER:  Vascular structures are patent.  No significant degenerative change of the cervical spine.  N
o abscess seen.

 

IMPRESSION:

 

1. Persistent but diminished in size of thick-walled collection and open wound which was felt to refl
ect abscess previously. Necrotic neoplasm not excluded.

2. Enlarging fungating cutaneous lesion.

3. Persistent adenopathy.

4. Improved conglomerate mass at the level of the internal jugular chain measuring 1.5 x 2.1 cm versu
s 3.7 x 2.8 cm previously.

 

 

 

EXAMINATION TYPE: CT neck chest w con

 

DATE OF EXAM: 2/21/2020

 

COMPARISON: 11/25/2019

 

HISTORY: Basal Cell CA

 

CT DLP: 757.9 mGycm

Automated exposure control for dose reduction was used.

 

CONTRAST: 

CT scan of the chest is performed with IV Contrast, patient injected with 100 mL of Isovue 300.

 

FINDINGS:

 

LUNGS: Right apical spiculated mass persists and currently measures 2.5 x 2.4 cm versus 2.4 x 2.3 cm 
previously. Adjacent nodule measures 2 cm in maximal dimension versus 2.1 cm previously. No new nodul
es or masses identified. The lungs are otherwise clear.

 

MEDIASTINUM: 8 mm high right paratracheal lymph node which previously measured 6 mm. Thoracic aorta i
s of normal caliber. The heart is not enlarged.

 

UPPER ABDOMEN:  No significant abnormality appreciated.

 

OTHER:  No additional significant abnormality is seen.

 

IMPRESSION:

1.  Right upper lobe spiculated masses as discussed.

## 2020-04-16 ENCOUNTER — HOSPITAL ENCOUNTER (OUTPATIENT)
Dept: HOSPITAL 47 - RADCTMAIN | Age: 70
Discharge: HOME | End: 2020-04-16
Attending: INTERNAL MEDICINE
Payer: MEDICARE

## 2020-04-16 DIAGNOSIS — C44.91: Primary | ICD-10-CM

## 2020-04-16 DIAGNOSIS — R23.4: ICD-10-CM

## 2020-04-16 DIAGNOSIS — L98.9: ICD-10-CM

## 2020-04-16 DIAGNOSIS — R59.9: ICD-10-CM

## 2020-04-16 DIAGNOSIS — C78.00: ICD-10-CM

## 2020-04-16 DIAGNOSIS — R91.8: ICD-10-CM

## 2020-04-16 LAB — BUN SERPL-SCNC: 9 MG/DL (ref 9–20)

## 2020-04-16 PROCEDURE — 70491 CT SOFT TISSUE NECK W/DYE: CPT

## 2020-04-16 PROCEDURE — 71260 CT THORAX DX C+: CPT

## 2020-04-16 PROCEDURE — 36415 COLL VENOUS BLD VENIPUNCTURE: CPT

## 2020-04-16 PROCEDURE — 84520 ASSAY OF UREA NITROGEN: CPT

## 2020-04-16 PROCEDURE — 82565 ASSAY OF CREATININE: CPT

## 2020-04-16 NOTE — CT
EXAMINATION TYPE: CT neck chest w con

 

DATE OF EXAM: 4/16/2020

 

COMPARISON: February 21, 2020

 

HISTORY: Basal Cell Ca

 

CT DLP: 919 mGycm

 

CONTRAST: 

CT scan of the neck is performed with IV Contrast, patient injected with 100 ml mL of Isovue 300.

 

Contrast enhanced CT of the neck was performed from the skull base through the lung apices.

 

Soft tissue attenuation at the site of open wound left neck which previously appeared consistent with
 abscess however now demonstrates more of a solid appearance which may reflect residual phlegmon or t
umor. No Abscess collection identified at this time. Skin lesion noted inferior to the left ureter ap
pears smaller in size and may have been excised. There is persistent skin thickening at this site. So
ft tissue posterior to the left internal carotid artery persists and is smaller in size and measures 
1.5 x 1.5 cm versus 2.1 x 1.5 cm previously. Proved skin thickening which may reflect improving featu
res of cellulitis.

 

AIRWAY:   The supraglottic, glottic, and subglottic portions of the airway appear patent and free of 
mass.

 

SALIVARY GLANDS:  The submandibular and parotid glands are free of mass or inflammatory process.

 

THYROID GLAND:  No nodules or masses seen.

 

LYMPH NODES: Improved adenopathy left internal jugular chain measuring 1 cm versus 1.2 cm. Posterior 
left neck lymph node measures 9.8 mm versus 1 cm previously. Adjacent to the right-sided strap muscle
s measures 7 mm versus 7 mm previously.

 

OTHER:  Vascular structures are patent.  No significant degenerative change of the cervical spine.  N
o abscess seen.

 

IMPRESSION:

 

1. Thick-walled collection identified left neck on prior study of the cervix more of a solid appearan
ce at this time compatible with resolution of abscess however there is persistent soft tissue consist
ent with phlegmon or residual neoplasm.

2. Improving adenopathy

3. Skin lesion below the left ear appears to have been partially excised. Continued skin thickening.

 

 

 

EXAMINATION TYPE: CT neck chest w con

 

DATE OF EXAM: 4/16/2020

 

COMPARISON: February 21, 2020

 

HISTORY: Basal Cell Ca

 

CT DLP: 919 mGycm

Automated exposure control for dose reduction was used.

 

CONTRAST: 

CT scan of the chest is performed with IV Contrast, patient injected with 100 ml mL of Isovue 300.

 

FINDINGS:

 

LUNGS: Lateral right upper lobe mass measures 2.2 cm versus 1.8 cm previously. Spiculated mass right 
upper lobe anteriorly measures 2.5 x 1.7 cm versus 2.5 x 2.4 cm previously.

 

MEDIASTINUM: There are no greater than 1 cm hilar or mediastinal lymph nodes.   No pericardial effusi
on is seen.  Thoracic aorta is of normal caliber. The heart is not enlarged.

 

UPPER ABDOMEN:  No significant abnormality appreciated.

 

OTHER:  No additional significant abnormality is seen.

 

IMPRESSION:

Right upper lobe spiculated masses are again noted which have remained stable to slightly improved. N
o evidence for adenopathy at this time.

## 2020-07-09 ENCOUNTER — HOSPITAL ENCOUNTER (OUTPATIENT)
Dept: HOSPITAL 47 - RADCTMAIN | Age: 70
Discharge: HOME | End: 2020-07-09
Attending: INTERNAL MEDICINE
Payer: MEDICARE

## 2020-07-09 DIAGNOSIS — C44.91: ICD-10-CM

## 2020-07-09 DIAGNOSIS — R91.8: Primary | ICD-10-CM

## 2020-07-09 DIAGNOSIS — C78.00: ICD-10-CM

## 2020-07-09 LAB — BUN SERPL-SCNC: 10 MG/DL (ref 9–20)

## 2020-07-09 PROCEDURE — 70491 CT SOFT TISSUE NECK W/DYE: CPT

## 2020-07-09 PROCEDURE — 82565 ASSAY OF CREATININE: CPT

## 2020-07-09 PROCEDURE — 71260 CT THORAX DX C+: CPT

## 2020-07-09 PROCEDURE — 84520 ASSAY OF UREA NITROGEN: CPT

## 2020-07-09 PROCEDURE — 36415 COLL VENOUS BLD VENIPUNCTURE: CPT

## 2020-07-09 NOTE — CT
EXAMINATION TYPE: CT neck chest w con

 

DATE OF EXAM: 7/9/2020

 

COMPARISON: 4/16/2020

 

HISTORY: follow up basal cell/lung cancer

 

CT DLP: 599.7 mGycm

 

CONTRAST: 

CT scan of the neck is performed with IV Contrast, patient injected with 100 mL of Isovue 300.

 

Contrast enhanced CT of the neck was performed from the skull base through the lung apices.

 

Persistent soft tissue mass left neck at the site of previous abscess. No abscess is identified at th
is time. Mass currently measures 2.7 x 1.8 cm versus 2.9 x 2.3 cm. Soft tissue posterior to the left 
internal carotid artery persists however appears smaller in size and currently measures 9 x 7 mm vers
us 15 x 15 mm previously. Cutaneous soft tissue lesion also persistent however smaller in size and me
asures 16 x 9 mm versus 30 x 14 mm previously.

 

AIRWAY:   The supraglottic, glottic, and subglottic portions of the airway appear patent and free of 
mass.

 

SALIVARY GLANDS:  The submandibular and parotid glands are free of mass or inflammatory process.

 

THYROID GLAND:  No nodules or masses seen.

 

LYMPH NODES: Continued adenopathy with interval improvement noted.

 

OTHER:  Vascular structures are patent.  No significant degenerative change of the cervical spine.  N
o abscess seen.

 

IMPRESSION:

Persistent but improved soft tissue about the left neck and posterior to the left internal carotid ar
elizabeth. Cutaneous soft tissue lesion also persists however is smaller in size. Adenopathy remains essen
tially stable in the interval.

 

 

EXAMINATION TYPE: CT neck chest w con

 

DATE OF EXAM: 7/9/2020

 

COMPARISON: 4/16/2020

 

HISTORY: follow up basal cell/lung cancer

 

CT DLP: 599.7 mGycm

Automated exposure control for dose reduction was used.

 

CONTRAST: 

CT scan of the chest is performed with IV Contrast, patient injected with 100 mL of Isovue 300.

 

FINDINGS:

 

LUNGS: Spiculated mass right upper lobe currently measures 2.1 x 1.6 cm versus 2.5 x 1.7 cm previousl
y. Additional right upper lobe mass density measures 2.1 cm versus 2.2 cm previously. No additional n
odules or masses identified.

 

MEDIASTINUM: There are no greater than 1 cm hilar or mediastinal lymph nodes.   No pericardial effusi
on is seen.  Thoracic aorta is of normal caliber. The heart is not enlarged.

 

UPPER ABDOMEN:  No significant abnormality appreciated.

 

OTHER:  Right adrenal nodule is stable.

 

IMPRESSION:

1. Right upper lobe spiculated masses persist although appear to be slightly smaller in size.

## 2020-09-10 ENCOUNTER — HOSPITAL ENCOUNTER (OUTPATIENT)
Dept: HOSPITAL 47 - RADCTMAIN | Age: 70
Discharge: HOME | End: 2020-09-10
Attending: INTERNAL MEDICINE
Payer: MEDICARE

## 2020-09-10 DIAGNOSIS — C44.91: ICD-10-CM

## 2020-09-10 DIAGNOSIS — J98.4: Primary | ICD-10-CM

## 2020-09-10 DIAGNOSIS — C78.00: ICD-10-CM

## 2020-09-10 LAB — BUN SERPL-SCNC: 10 MG/DL (ref 9–20)

## 2020-09-10 PROCEDURE — 84520 ASSAY OF UREA NITROGEN: CPT

## 2020-09-10 PROCEDURE — 82565 ASSAY OF CREATININE: CPT

## 2020-09-10 PROCEDURE — 70491 CT SOFT TISSUE NECK W/DYE: CPT

## 2020-09-10 PROCEDURE — 71260 CT THORAX DX C+: CPT

## 2020-09-10 NOTE — CT
EXAMINATION TYPE: CT neck chest w con

 

DATE OF EXAM: 9/10/2020

 

COMPARISON: CT neck and chest July 9, 2020 and older CTs. PET/CT October 19, 2019.

 

HISTORY: Lung CA, basal cell carcinoma (Lt side of neck)

 

CT DLP: 688.8 mGycm.   Automated Exposure Control for Dose Reduction was Utilized.

 

 

TECHNIQUE:  CT scan of the neck and thorax are performed following with IV Contrast, patient injected
 with 100 mL of Isovue 300.

 

FINDINGS: 

 

Neck:

 

Airway: No gross abnormality seen.

 

Parotid/submandibular glands:  No gross abnormality seen.

 

Carotid/Vascular Structures: Mild peripheral calcified plaque bilateral carotid bulb level redemonstr
ated. 

 

Osseous Structures: Scoliotic curvature with multilevel spurring the spine.

 

Other: Worsening skin thickening over the left neck near hyoid bone level. There is better visualized
 irregular hypodense lesion with rim enhancement axial image 46 posterior to the carotid bulb measuri
ng 1.3 x 1.1 x 1.6 cm axial image 46 and coronal image 34. Some adjacent heterogeneous tissue extends
 inferior and just posterior to this towards the left lateral neck where there are additional smaller
 areas of hypodensity that have irregular rim enhancement surrounded by abnormal soft tissue. Superio
r to this there is persistent deformity near level of mandibular angle extending to skin surface with
 increasing heterogeneous enhancing soft tissue axial image 55 measuring roughly 4.5 x 1.6 cm. In add
ition there is a new 7 mm avidly enhancing round lesion in the thickened dermis posterior left neck a
xial image 46. There is 9 mm nodule anteriorly just right of midline at level of vocal cortex measure
s 43 without significant change from most recent CT, larger from older studies. There is 1.1 by 0.9 c
m lateral left supraclavicular soft tissue nodule axial image 31 not significant change from most rec
ent CT, larger from older studies.

 

IMPRESSION:  Interval change at site of known neoplasm is suspicious for neoplastic progression and/o
r progression of infection/multiloculated abscesses versus the most recent CT. 

 

Thorax:

 

LUNGS: Right upper lobe redemonstrates linear and slightly more irregular scarring. There is persiste
nt 2.0 x 0.9 cm spiculated nodule axial image 17 not significantly changed from most recent PET/CT or
 CT. Just inferior anterior to this there is a larger spiculated 2.3 x 1.9 cm lesion not significantl
y changed from most recent CT when accounting for technical differences. No new nodules or masses. No
 pleural effusion or pneumothorax noted bilaterally.

 

MEDIASTINUM: There are no new greater than 1 cm hilar or mediastinal lymph nodes.   No cardiomegaly o
r pericardial effusion is seen.  Moderate to severe three-vessel coronary artery calcification is pre
sent.

 

OTHER: Subareolar nodular gynecomastia redemonstrated. Exaggerated thoracic kyphosis. Severe multilev
el spurring in the visualized spine. Underlying scoliotic curvature.

 

IMPRESSION: The 2 adjacent nodules and their scarring right upper lobe are not significantly changed 
in size or appearance from most recent CT. No new suspicious nodules or adenopathy noted.

## 2020-09-25 ENCOUNTER — HOSPITAL ENCOUNTER (EMERGENCY)
Dept: HOSPITAL 47 - EC | Age: 70
Discharge: HOME | End: 2020-09-25
Payer: MEDICARE

## 2020-09-25 VITALS — RESPIRATION RATE: 18 BRPM | SYSTOLIC BLOOD PRESSURE: 129 MMHG | HEART RATE: 79 BPM | DIASTOLIC BLOOD PRESSURE: 83 MMHG

## 2020-09-25 VITALS — TEMPERATURE: 98.6 F

## 2020-09-25 DIAGNOSIS — C78.00: ICD-10-CM

## 2020-09-25 DIAGNOSIS — R31.9: ICD-10-CM

## 2020-09-25 DIAGNOSIS — Z96.641: ICD-10-CM

## 2020-09-25 DIAGNOSIS — Z85.828: ICD-10-CM

## 2020-09-25 DIAGNOSIS — Z79.899: ICD-10-CM

## 2020-09-25 DIAGNOSIS — Z95.5: ICD-10-CM

## 2020-09-25 DIAGNOSIS — Z80.3: ICD-10-CM

## 2020-09-25 DIAGNOSIS — I10: ICD-10-CM

## 2020-09-25 DIAGNOSIS — N32.89: Primary | ICD-10-CM

## 2020-09-25 LAB
ALBUMIN SERPL-MCNC: 3.9 G/DL (ref 3.5–5)
ALP SERPL-CCNC: 64 U/L (ref 38–126)
ALT SERPL-CCNC: 13 U/L (ref 4–49)
ANION GAP SERPL CALC-SCNC: 8 MMOL/L
APTT BLD: 22.3 SEC (ref 22–30)
AST SERPL-CCNC: 26 U/L (ref 17–59)
BASOPHILS # BLD AUTO: 0 K/UL (ref 0–0.2)
BASOPHILS NFR BLD AUTO: 0 %
BUN SERPL-SCNC: 10 MG/DL (ref 9–20)
CALCIUM SPEC-MCNC: 9.8 MG/DL (ref 8.4–10.2)
CHLORIDE SERPL-SCNC: 96 MMOL/L (ref 98–107)
CO2 SERPL-SCNC: 29 MMOL/L (ref 22–30)
EOSINOPHIL # BLD AUTO: 0 K/UL (ref 0–0.7)
EOSINOPHIL NFR BLD AUTO: 0 %
ERYTHROCYTE [DISTWIDTH] IN BLOOD BY AUTOMATED COUNT: 3.8 M/UL (ref 4.3–5.9)
ERYTHROCYTE [DISTWIDTH] IN BLOOD: 16.3 % (ref 11.5–15.5)
GLUCOSE SERPL-MCNC: 119 MG/DL (ref 74–99)
HCT VFR BLD AUTO: 29.1 % (ref 39–53)
HGB BLD-MCNC: 9.2 GM/DL (ref 13–17.5)
INR PPP: 1 (ref ?–1.2)
LYMPHOCYTES # SPEC AUTO: 0.9 K/UL (ref 1–4.8)
LYMPHOCYTES NFR SPEC AUTO: 15 %
MCH RBC QN AUTO: 24.2 PG (ref 25–35)
MCHC RBC AUTO-ENTMCNC: 31.5 G/DL (ref 31–37)
MCV RBC AUTO: 76.6 FL (ref 80–100)
MONOCYTES # BLD AUTO: 0.4 K/UL (ref 0–1)
MONOCYTES NFR BLD AUTO: 6 %
NEUTROPHILS # BLD AUTO: 4.8 K/UL (ref 1.3–7.7)
NEUTROPHILS NFR BLD AUTO: 76 %
PLATELET # BLD AUTO: 355 K/UL (ref 150–450)
POTASSIUM SERPL-SCNC: 4 MMOL/L (ref 3.5–5.1)
PROT SERPL-MCNC: 7 G/DL (ref 6.3–8.2)
PT BLD: 9.9 SEC (ref 9–12)
RBC UR QL: >182 /HPF (ref 0–5)
SODIUM SERPL-SCNC: 133 MMOL/L (ref 137–145)
WBC # BLD AUTO: 6.3 K/UL (ref 3.8–10.6)
WBC #/AREA URNS HPF: >182 /HPF (ref 0–5)

## 2020-09-25 PROCEDURE — 85610 PROTHROMBIN TIME: CPT

## 2020-09-25 PROCEDURE — 36415 COLL VENOUS BLD VENIPUNCTURE: CPT

## 2020-09-25 PROCEDURE — 76770 US EXAM ABDO BACK WALL COMP: CPT

## 2020-09-25 PROCEDURE — 81001 URINALYSIS AUTO W/SCOPE: CPT

## 2020-09-25 PROCEDURE — 99284 EMERGENCY DEPT VISIT MOD MDM: CPT

## 2020-09-25 PROCEDURE — 85025 COMPLETE CBC W/AUTO DIFF WBC: CPT

## 2020-09-25 PROCEDURE — 80053 COMPREHEN METABOLIC PANEL: CPT

## 2020-09-25 PROCEDURE — 85730 THROMBOPLASTIN TIME PARTIAL: CPT

## 2020-09-25 NOTE — ED
Male Urogenital HPI





- General


Chief complaint: Urogenital


Stated complaint: blood clots in urine


Time Seen by Provider: 09/25/20 12:31


Source: patient


Mode of arrival: ambulatory


Limitations: no limitations





- History of Present Illness


Initial comments: 


70-year-old male with history of metastatic basal cell carcinoma of the left 

side of the neck presenting to the emergency department today for chief 

complaint of hematuria times months.  Patient states his blood in his urine for 

months now he states that he was following this with Dr. Posada who is going to 

refer him to urologist.  Patient states he has not been set up the urologist yet

he states that for the past few days he has had increasing clots at times a 

block his ability to urinate patient states he has been able to urinate today 

and prior to arrival.  Patient denies any lightheadedness presyncope 

palpitations or cold intolerance.  Patient denies any lower abdominal pain 

nausea vomiting or back pain is no additional complaints patient has no known 

history of bladder cancer he denies additional complaints upon arrival patient 

appears well in no distress vital signs within acceptable limits.  He denies any

anticoagulation use








- Related Data


                                Home Medications











 Medication  Instructions  Recorded  Confirmed


 


Acetaminophen [Tylenol Arthritis] 650 mg PO BID PRN 10/31/19 11/22/19


 


Metoprolol Tartrate [Lopressor] 12.5 mg PO HS 11/22/19 11/22/19








                                  Previous Rx's











 Medication  Instructions  Recorded


 


Melatonin 10 mg PO HS #30 tablet.er 11/03/19


 


hydrALAZINE HCL [Apresoline] 25 mg PO TID #90 tab 11/03/19


 


Potassium Chloride ER [K-Dur 20] 20 meq PO DAILY #30 tab 11/27/19


 


ceFAZolin [Kefzol] 2,000 mg IVPB Q8HR #42 vial 11/27/19


 


guaiFENesin--10MG/5ML 10 ml PO Q6H PRN  cup 11/27/19





[Robitussin DM]  











                                    Allergies











Allergy/AdvReac Type Severity Reaction Status Date / Time


 


No Known Allergies Allergy   Verified 09/25/20 12:29














Review of Systems


ROS Statement: 


Those systems with pertinent positive or pertinent negative responses have been 

documented in the HPI.





ROS Other: All systems not noted in ROS Statement are negative.





Past Medical History


Past Medical History: Cancer, Hypertension


Additional Past Medical History / Comment(s): HX SKIN CA, CURRENT LESION ON LEFT

CHEEK, lung CA, CA in the lymph nodes. 10 treatments of radiation over 3 week 

period, last treatment 11/20/19.


History of Any Multi-Drug Resistant Organisms: None Reported


Past Surgical History: Back Surgery, Heart Catheterization, Joint Replacement, 

Orthopedic Surgery


Additional Past Surgical History / Comment(s): PREVIOUS SKIN CA LESIONS REMOVED,

R HIP REPLACEMENT, ANKLE ORIF WITH METAL L, lung biopsy, neck biopsy, basal cell

carcinoma (lungs, lymphnodes and on left side of neck).


Past Anesthesia/Blood Transfusion Reactions: Previous Problems w/ Anesthesia


Additional Past Anesthesia/Blood Transfusion Reaction / Comment(s): Pt woke up 

violently, which is out of characteristic for the pt


Past Psychological History: No Psychological Hx Reported


Smoking Status: Never smoker


Past Alcohol Use History: Occasional


Past Drug Use History: None Reported





- Past Family History


  ** Mother


Family Medical History: Cancer, Deep Vein Thrombosis (DVT)


Additional Family Medical History / Comment(s): BREAST CA





General Exam





- General Exam Comments


Initial Comments: 


General:  The patient is awake and alert, in no distress, and does not appear 

acutely ill. 


Eye:  Pupils are equal, round and reactive to light, extra-ocular movements are 

intact.  No nystagmus.  There is normal conjunctiva bilaterally.  No signs of 

icterus.    


Cardiovascular:  There is a regular rate and rhythm. No murmur, rub or gallop is

appreciated.


Respiratory:  Lungs are clear to auscultation, respirations are non-labored, 

breath sounds are equal.  No wheezes, stridor, rales, or rhonchi.


Gastrointestinal:  Soft, non-distended, non-tender abdomen without masses or 

organomegaly noted. There is no rebound or guarding present


Musculoskeletal:  Normal ROM, no tenderness.  Strength 5/5. Sensation intact. 

Pulses equal bilaterally 2+.  


Neurological:  A&O x 3. CN II-XII intact grossly, There are no obvious motor or 

sensory deficits. Coordination appears grossly intact. Speech is normal.


Skin:  Skin is warm and dry and no rashes or lesions are noted. 


Psychiatric:  Cooperative, appropriate mood & affect, normal judgment.  





Limitations: no limitations





Course


                                   Vital Signs











  09/25/20 09/25/20





  12:26 14:35


 


Temperature 98.6 F 


 


Pulse Rate 92 79


 


Respiratory 16 18





Rate  


 


Blood Pressure 117/85 129/83


 


O2 Sat by Pulse 100 99





Oximetry  














Medical Decision Making





- Medical Decision Making


Nontoxic-appearing 70-year-old male presenting for blood in urine times months 

increasing clots for 3 days.  Urinalysis revealed gross hematuria.  Patient has 

findings on ultrasound concerning for bladder mass.  Patient's hemoglobin is 

near baseline.  He is not symptomatic.  Heart rate and blood pressure stable.  

Patient states he prefers discharge.  He states he has a nurse practitioner 

Zoe at home and he'll be able to get labs ordered within the next 24-48 hours 

for repeat hemoglobin.  Patient was provided urology follow-up.  It is to return

for any palpitations cold intolerance lightheadedness weakness, or increasing 

bleeding or inability to urinate.  Patient verbalized understanding of 

discharge.  While wife who is bedside is agreeable to this care plan.  Discussed

the case with any provider Dr. Díaz who is agreeable to care plan.








- Lab Data


Result diagrams: 


                                 09/25/20 12:49





                                 09/25/20 12:49


                                   Lab Results











  09/25/20 09/25/20 09/25/20 Range/Units





  12:49 12:49 12:49 


 


WBC  6.3    (3.8-10.6)  k/uL


 


RBC  3.80 L    (4.30-5.90)  m/uL


 


Hgb  9.2 L    (13.0-17.5)  gm/dL


 


Hct  29.1 L    (39.0-53.0)  %


 


MCV  76.6 L    (80.0-100.0)  fL


 


MCH  24.2 L    (25.0-35.0)  pg


 


MCHC  31.5    (31.0-37.0)  g/dL


 


RDW  16.3 H    (11.5-15.5)  %


 


Plt Count  355    (150-450)  k/uL


 


Neutrophils %  76    %


 


Lymphocytes %  15    %


 


Monocytes %  6    %


 


Eosinophils %  0    %


 


Basophils %  0    %


 


Neutrophils #  4.8    (1.3-7.7)  k/uL


 


Lymphocytes #  0.9 L    (1.0-4.8)  k/uL


 


Monocytes #  0.4    (0-1.0)  k/uL


 


Eosinophils #  0.0    (0-0.7)  k/uL


 


Basophils #  0.0    (0-0.2)  k/uL


 


Hypochromasia  Moderate    


 


Anisocytosis  Slight    


 


Microcytosis  Slight    


 


PT     (9.0-12.0)  sec


 


INR     (<1.2)  


 


APTT     (22.0-30.0)  sec


 


Sodium    133 L  (137-145)  mmol/L


 


Potassium    4.0  (3.5-5.1)  mmol/L


 


Chloride    96 L  ()  mmol/L


 


Carbon Dioxide    29  (22-30)  mmol/L


 


Anion Gap    8  mmol/L


 


BUN    10  (9-20)  mg/dL


 


Creatinine    0.80  (0.66-1.25)  mg/dL


 


Est GFR (CKD-EPI)AfAm    >90  (>60 ml/min/1.73 sqM)  


 


Est GFR (CKD-EPI)NonAf    >90  (>60 ml/min/1.73 sqM)  


 


Glucose    119 H  (74-99)  mg/dL


 


Calcium    9.8  (8.4-10.2)  mg/dL


 


Total Bilirubin    0.3  (0.2-1.3)  mg/dL


 


AST    26  (17-59)  U/L


 


ALT    13  (4-49)  U/L


 


Alkaline Phosphatase    64  ()  U/L


 


Total Protein    7.0  (6.3-8.2)  g/dL


 


Albumin    3.9  (3.5-5.0)  g/dL


 


Urine Color   Dark Red   


 


Urine Appearance   Bloody   (Clear)  


 


Urine RBC   >182 H   (0-5)  /hpf


 


Urine WBC   >182 H   (0-5)  /hpf


 


Urine Mucus   Many H   (None)  /hpf














  09/25/20 Range/Units





  12:49 


 


WBC   (3.8-10.6)  k/uL


 


RBC   (4.30-5.90)  m/uL


 


Hgb   (13.0-17.5)  gm/dL


 


Hct   (39.0-53.0)  %


 


MCV   (80.0-100.0)  fL


 


MCH   (25.0-35.0)  pg


 


MCHC   (31.0-37.0)  g/dL


 


RDW   (11.5-15.5)  %


 


Plt Count   (150-450)  k/uL


 


Neutrophils %   %


 


Lymphocytes %   %


 


Monocytes %   %


 


Eosinophils %   %


 


Basophils %   %


 


Neutrophils #   (1.3-7.7)  k/uL


 


Lymphocytes #   (1.0-4.8)  k/uL


 


Monocytes #   (0-1.0)  k/uL


 


Eosinophils #   (0-0.7)  k/uL


 


Basophils #   (0-0.2)  k/uL


 


Hypochromasia   


 


Anisocytosis   


 


Microcytosis   


 


PT  9.9  (9.0-12.0)  sec


 


INR  1.0  (<1.2)  


 


APTT  22.3  (22.0-30.0)  sec


 


Sodium   (137-145)  mmol/L


 


Potassium   (3.5-5.1)  mmol/L


 


Chloride   ()  mmol/L


 


Carbon Dioxide   (22-30)  mmol/L


 


Anion Gap   mmol/L


 


BUN   (9-20)  mg/dL


 


Creatinine   (0.66-1.25)  mg/dL


 


Est GFR (CKD-EPI)AfAm   (>60 ml/min/1.73 sqM)  


 


Est GFR (CKD-EPI)NonAf   (>60 ml/min/1.73 sqM)  


 


Glucose   (74-99)  mg/dL


 


Calcium   (8.4-10.2)  mg/dL


 


Total Bilirubin   (0.2-1.3)  mg/dL


 


AST   (17-59)  U/L


 


ALT   (4-49)  U/L


 


Alkaline Phosphatase   ()  U/L


 


Total Protein   (6.3-8.2)  g/dL


 


Albumin   (3.5-5.0)  g/dL


 


Urine Color   


 


Urine Appearance   (Clear)  


 


Urine RBC   (0-5)  /hpf


 


Urine WBC   (0-5)  /hpf


 


Urine Mucus   (None)  /hpf














Disposition


Clinical Impression: 


 Bladder mass, Hematuria





Disposition: HOME SELF-CARE


Condition: Good


Instructions (If sedation given, give patient instructions):  Hematuria (ED)


Additional Instructions: 


Please use medication as discussed.  Please follow-up with family doctor in the 

next 2 days, repeat HgB in 24-48 hours. Recommend urology f/u in the next 2-5 

days. Return if you feel lightheaded, have low blood pressure readings, 

increasing bleeding or inability to urinate.  Please return to emergency room if

the symptoms increase or worsen or for any other concerns.


Is patient prescribed a controlled substance at d/c from ED?: No


Referrals: 


Mina Mata DO [Primary Care Provider] - 1-2 days


Sascha Alex MD [STAFF PHYSICIAN] - 1-2 days


Time of Disposition: 14:18

## 2020-09-25 NOTE — US
EXAMINATION TYPE: US renals and bladder

 

DATE OF EXAM: 9/25/2020

 

COMPARISON: PET/CT 10/19/2019

 

CLINICAL HISTORY: hematuria. Hematuria x few months.  Patient states having larger clots now.  Hx jen
g and skin cancer and undergoing treatment.  

 

EXAM MEASUREMENTS:

 

Right Kidney:  9.3 x 4.6 x 3.7 cm

Left Kidney: 10.2 x 4.2 x 4.9 cm

 

 

 

Right Kidney: No hydronephrosis or masses seen  

Left Kidney: No hydronephrosis or masses seen, with limited visualization of lower pole due to overly
ing bowel gas.   

Bladder: Polypoidal mass visualized within bladder with venous and arterial flow visualized= 4.9 x 3.
6 x 4.5 cm.  Bladder wall = 2.6 mm.   

**Bilateral Jets not seen

 

Prostate: enlarged, 4.8 x 5.9 x 3.6 cm.  

 

 

 

IMPRESSION:

1.  Large polypoidal heterogenous mass within the urinary bladder with arterial and venous flow. Urol
ogy consult recommended.

2.  No hydronephrosis bilaterally.

## 2020-10-08 NOTE — P.GSHP
History of Present Illness


H&P Date: 10/08/20


Chief Complaint: Gross Hematuria


The patient is a 70 year old white male with a 2 month history of gross 

hematuria.  He has an unremarkable urologic history.  Recent ultrasound showed 

normal kidneys, but a 4.9 cm bladder mass was seen.








- Constitutional


Constitutional: Reports fatigue, Reports weight loss





- Cardiovascular


Cardiovascular: Reports high blood pressure





- Genitourinary (Male)


Genitourinary: Reports hematuria





Past Medical History


Past Medical History: Cancer, Hypertension


Additional Past Medical History / Comment(s): HX SKIN CA, CURRENT LESION ON LEFT

CHEEK, lung CA, CA in the lymph nodes. 10 treatments of radiation over 3 week 

period, last treatment 11/20/19.


History of Any Multi-Drug Resistant Organisms: None Reported


Past Surgical History: Back Surgery, Heart Catheterization, Joint Replacement, 

Orthopedic Surgery


Additional Past Surgical History / Comment(s): PREVIOUS SKIN CA LESIONS REMOVED,

R HIP REPLACEMENT, ANKLE ORIF WITH METAL L, lung biopsy, neck biopsy, basal cell

carcinoma (lungs, lymphnodes and on left side of neck).


Past Anesthesia/Blood Transfusion Reactions: Previous Problems w/ Anesthesia


Additional Past Anesthesia/Blood Transfusion Reaction / Comment(s): Pt woke up 

violently, which is out of characteristic for the pt


Past Psychological History: No Psychological Hx Reported


Smoking Status: Never smoker


Past Alcohol Use History: Occasional


Past Drug Use History: None Reported





- Past Family History


  ** Mother


Family Medical History: Cancer, Deep Vein Thrombosis (DVT)


Additional Family Medical History / Comment(s): BREAST CA





Medications and Allergies


                                Home Medications











 Medication  Instructions  Recorded  Confirmed  Type


 


Acetaminophen [Tylenol Arthritis] 650 mg PO BID PRN 10/31/19 11/22/19 History


 


Melatonin 10 mg PO HS #30 tablet.er 11/03/19 11/22/19 Rx


 


hydrALAZINE HCL [Apresoline] 25 mg PO TID #90 tab 11/03/19 11/22/19 Rx


 


Metoprolol Tartrate [Lopressor] 12.5 mg PO HS 11/22/19 11/22/19 History


 


Potassium Chloride ER [K-Dur 20] 20 meq PO DAILY #30 tab 11/27/19  Rx


 


ceFAZolin [Kefzol] 2,000 mg IVPB Q8HR #42 vial 11/27/19  Rx


 


guaiFENesin--10MG/5ML 10 ml PO Q6H PRN  cup 11/27/19  Rx





[Robitussin DM]    








                                    Allergies











Allergy/AdvReac Type Severity Reaction Status Date / Time


 


No Known Allergies Allergy   Verified 09/25/20 12:29














Surgical - Exam





- General


well developed, well nourished, no distress





- Neck


other (Mass on left)





- Respiratory


normal respiratory effort, clear to auscultation





- Cardiovascular


Rhythm: regular


Abnormal Heart Sounds: no systolic murmur, no diastolic murmur, no rub, no S3 

Gallop, no S4 Gallop, no click, no other





- Abdomen


Abdomen: soft, non tender, no guarding, no rigid, no rebound





- Genitourinary


normal penis with no external lesions, testicles non-tender





- Rectum


Rectum: normal sphincter tone, no masses





- Psychiatric


oriented to time, oriented to person, oriented to place, speech is normal, 

memory intact





Results





- Imaging


US - kidney/bladder: report reviewed





Assessment and Plan


(1) Bladder neoplasm of uncertain malignant potential


Status: Acute   Code(s): D41.4 - NEOPLASM OF UNCERTAIN BEHAVIOR OF BLADDER   

SNOMED Code(s): 09151068


   


Plan: 


Cystoscopy, transurethral resection of bladder tumor.  The patient is aware of 

the likelihood that the lesion is malignant in nature.  The procedure has been 

reviewed in detail with the patient and his wife.  They have been made aware of 

potential risks, which include anesthesia, bleeding, infection, and bladder 

perforation.  He is aware that he will require a Sandoval catheter postoperatively.

## 2020-10-14 ENCOUNTER — HOSPITAL ENCOUNTER (OUTPATIENT)
Dept: HOSPITAL 47 - OR | Age: 70
Discharge: HOME | End: 2020-10-14
Attending: UROLOGY
Payer: MEDICARE

## 2020-10-14 VITALS — TEMPERATURE: 97.8 F

## 2020-10-14 VITALS — BODY MASS INDEX: 20.8 KG/M2

## 2020-10-14 VITALS — HEART RATE: 71 BPM | SYSTOLIC BLOOD PRESSURE: 148 MMHG | DIASTOLIC BLOOD PRESSURE: 85 MMHG

## 2020-10-14 VITALS — RESPIRATION RATE: 16 BRPM

## 2020-10-14 DIAGNOSIS — I10: ICD-10-CM

## 2020-10-14 DIAGNOSIS — Z92.3: ICD-10-CM

## 2020-10-14 DIAGNOSIS — Z85.118: ICD-10-CM

## 2020-10-14 DIAGNOSIS — Z98.890: ICD-10-CM

## 2020-10-14 DIAGNOSIS — Z96.641: ICD-10-CM

## 2020-10-14 DIAGNOSIS — Z85.79: ICD-10-CM

## 2020-10-14 DIAGNOSIS — Z80.3: ICD-10-CM

## 2020-10-14 DIAGNOSIS — C67.9: Primary | ICD-10-CM

## 2020-10-14 DIAGNOSIS — Z82.49: ICD-10-CM

## 2020-10-14 DIAGNOSIS — Z79.899: ICD-10-CM

## 2020-10-14 DIAGNOSIS — Z85.828: ICD-10-CM

## 2020-10-14 LAB
ERYTHROCYTE [DISTWIDTH] IN BLOOD BY AUTOMATED COUNT: 4.29 M/UL (ref 4.3–5.9)
ERYTHROCYTE [DISTWIDTH] IN BLOOD: 17.5 % (ref 11.5–15.5)
HCT VFR BLD AUTO: 34.7 % (ref 39–53)
HGB BLD-MCNC: 10.2 GM/DL (ref 13–17.5)
MCH RBC QN AUTO: 23.8 PG (ref 25–35)
MCHC RBC AUTO-ENTMCNC: 29.4 G/DL (ref 31–37)
MCV RBC AUTO: 80.8 FL (ref 80–100)
PLATELET # BLD AUTO: 454 K/UL (ref 150–450)
WBC # BLD AUTO: 7.7 K/UL (ref 3.8–10.6)

## 2020-10-14 PROCEDURE — 88307 TISSUE EXAM BY PATHOLOGIST: CPT

## 2020-10-14 PROCEDURE — 86900 BLOOD TYPING SEROLOGIC ABO: CPT

## 2020-10-14 PROCEDURE — 86901 BLOOD TYPING SEROLOGIC RH(D): CPT

## 2020-10-14 PROCEDURE — 85027 COMPLETE CBC AUTOMATED: CPT

## 2020-10-14 PROCEDURE — 52240 CYSTOSCOPY AND TREATMENT: CPT

## 2020-10-14 PROCEDURE — 86850 RBC ANTIBODY SCREEN: CPT

## 2020-10-14 RX ADMIN — HYDROMORPHONE HYDROCHLORIDE PRN MG: 1 INJECTION, SOLUTION INTRAMUSCULAR; INTRAVENOUS; SUBCUTANEOUS at 16:08

## 2020-10-14 RX ADMIN — HYDROMORPHONE HYDROCHLORIDE PRN MG: 1 INJECTION, SOLUTION INTRAMUSCULAR; INTRAVENOUS; SUBCUTANEOUS at 16:03

## 2020-10-14 NOTE — P.OP
Date of Procedure: 10/14/20


Preoperative Diagnosis: 


Bladder tumor


Postoperative Diagnosis: 


Bladder tumors


Procedure(s) Performed: 


Cystoscopy, transurethral resection of bladder tumors (Large)


Anesthesia: INOCENCIOA


Surgeon: Sascha Alex


Estimated Blood Loss (ml): 30


IV fluids (ml): 1,000


Pathology: other (Bladder tumor fragments)


Condition: stable


Disposition: PACU


Indications for Procedure: 


The patient is a 70 year old white male with a 2 month history of gross 

hematuria.  He has an unremarkable urologic history.  Recent ultrasound showed 

normal kidneys, but a 4.9 cm bladder mass was seen.


Operative Findings: 


6 cm tumor arising from left lateral bladder wall, papillary in nature.  

Additional small papillary tumors and left anterior bladder wall and right 

vesical neck.  Flat tumor noted on bladder trigone and posterior bladder wall.  

All visible tumor resected.


Description of Procedure: 


The patient was taken in the operating room and placed in the dorsal lithotomy 

position, with his legs supported in Olaf stirrups.  The external genitalia was

prepped and draped sterilely.  The 25-Syrian [employer] resectoscope sheath was 

introduced into the bladder.  The bladder was inspected.  Both ureteral orifices

were of normal anatomic location and configuration, and clear urine effluxed 

from both.  The entire bladder was examined, revealing a large tumor arising 

from the left lateral bladder wall, measuring approximately 6 cm in size.  An 

additional 1.5 cm tumor arises from the right vesical neck.  The prostate was 

partially occluded with a bilobar configuration.  No urothelial changes were 

seen within the prostatic urethra.





Using the bipolar cutting loop, the tumors were resected down to the muscle.  

Excellent hemostasis was attained.  An additional small tumor was seen on the 

left anterior bladder wall, and flat tumor was noted in the midline on the 

posterior bladder wall and trigone.  All visible tumor was resected.  It was not

necessary to resect either ureteral orifice.  The [employer] evacuator was used to r

emove all of the tissue fragments from the bladder.  The resected tissue was 

saved and sent for pathologic examination.  A 20-Syrian Sandoval catheter was 

inserted.  The return was clear.  The patient tolerated the procedure well.  He 

was taken to the recovery room in stable condition.

## 2020-10-19 ENCOUNTER — HOSPITAL ENCOUNTER (EMERGENCY)
Dept: HOSPITAL 47 - EC | Age: 70
Discharge: HOME | End: 2020-10-19
Payer: MEDICARE

## 2020-10-19 VITALS — TEMPERATURE: 98.2 F

## 2020-10-19 VITALS — RESPIRATION RATE: 16 BRPM | SYSTOLIC BLOOD PRESSURE: 147 MMHG | DIASTOLIC BLOOD PRESSURE: 81 MMHG | HEART RATE: 82 BPM

## 2020-10-19 DIAGNOSIS — Z79.899: ICD-10-CM

## 2020-10-19 DIAGNOSIS — Z96.641: ICD-10-CM

## 2020-10-19 DIAGNOSIS — I10: ICD-10-CM

## 2020-10-19 DIAGNOSIS — Z85.828: ICD-10-CM

## 2020-10-19 DIAGNOSIS — Z85.118: ICD-10-CM

## 2020-10-19 DIAGNOSIS — M25.511: Primary | ICD-10-CM

## 2020-10-19 PROCEDURE — 99283 EMERGENCY DEPT VISIT LOW MDM: CPT

## 2020-10-19 PROCEDURE — 96372 THER/PROPH/DIAG INJ SC/IM: CPT

## 2020-10-19 PROCEDURE — 73030 X-RAY EXAM OF SHOULDER: CPT

## 2020-10-19 PROCEDURE — 73060 X-RAY EXAM OF HUMERUS: CPT

## 2020-10-19 NOTE — XR
Result:

 

Clinical History: Pain.

 

Comparison: None available. 

 

Technique: 

3 views of the right shoulder.

2 views of the right humerus.

 

Findings: 

 

The bone mineralization is appropriate for age. 

 

There is no acute fracture or dislocation of the right shoulder or humerus. There is moderate osteoar
thritis of the acromioclavicular and elbow joints.

 

 

Impression:     

 

No acute osseous abnormality of the right shoulder or humerus.

## 2020-10-19 NOTE — ED
Extremity Problem HPI





- General


Chief complaint: Extremity Problem,Nontraumatic


Stated complaint: right arm pain


Time Seen by Provider: 10/19/20 16:16


Source: patient


Mode of arrival: wheelchair


Limitations: no limitations





- History of Present Illness


Initial comments: 





70-year-old male presents to the emergency department accompanied by his wife 

for evaluation of right shoulder pain that radiates to the right elbow.  Patient

states, "I have a pinched nerve."  Attributes his pain to poor sleep position 

during the previous night.  Patient has a mass on the left side of his neck 

which affects the manner in which he is able to rest.  Patient states he took 

half of a Norco at noon today for which he is prescribed due to metastatic 

disease.  No relief of pain with Norco and states discomfort worsens when he 

tries to move his right elbow and shoulder.  Denies numbness or tingling in the 

right hand.  Patient denies any recent rash, fever, chills, cough, shortness of 

breath, chest pain, abdominal pain, nausea, vomiting, diarrhea, constipation, 

back pain, dizziness, weakness, hematuria, dysuria, urinary urgency, urinary 

frequency, headache, visual changes, or any other complaints.





- Related Data


                                Home Medications











 Medication  Instructions  Recorded  Confirmed


 


Metoprolol Tartrate [Lopressor] 12.5 mg PO HS 11/22/19 10/14/20


 


Erivedge 150 mg PO QAM 10/12/20 10/14/20


 


Ferrous Sulfate [Feosol] 325 mg PO DAILY 10/12/20 10/14/20


 


HYDROcodone/APAP 5-325MG [Norco 0.5 tab PO Q6HR PRN 10/12/20 10/14/20





5-325]   


 


Ondansetron [Zofran] 4 - 8 mg PO Q4HR PRN 10/12/20 10/14/20


 


Sennosides [Senna] 8.6 mg PO HS 10/12/20 10/14/20








                                  Previous Rx's











 Medication  Instructions  Recorded


 


hydrALAZINE HCL [Apresoline] 25 mg PO TID #90 tab 11/03/19


 


Cyclobenzaprine [Flexeril] 10 mg PO TID #15 tab 10/19/20











                                    Allergies











Allergy/AdvReac Type Severity Reaction Status Date / Time


 


No Known Allergies Allergy   Verified 10/14/20 12:12














Review of Systems


ROS Statement: 


Those systems with pertinent positive or pertinent negative responses have been 

documented in the HPI.





ROS Other: All systems not noted in ROS Statement are negative.





Past Medical History


Past Medical History: Cancer, Hypertension, Pneumonia


Additional Past Medical History / Comment(s): hx skin cancer, lung CA, CA in the

 lymph nodes.  radiation- last treatment April 14,2020. currently taking oral 

chemo.  constipation, anemia, growth on neck, recent hematuria


History of Any Multi-Drug Resistant Organisms: None Reported


Past Surgical History: Back Surgery, Heart Catheterization, Joint Replacement, 

Orthopedic Surgery


Additional Past Surgical History / Comment(s): PREVIOUS SKIN CA LESIONS REMOVED,

 RT HIP REPLACEMENT, left ANKLE ORIF WITH METAL , lung biopsy, neck biopsy,  

heart cath > 10 yrs ago


Past Anesthesia/Blood Transfusion Reactions: Previous Problems w/ Anesthesia


Additional Past Anesthesia/Blood Transfusion Reaction / Comment(s): Pt woke up 

violently during lung biopsy


Past Psychological History: No Psychological Hx Reported


Smoking Status: Never smoker





- Past Family History


  ** Mother


Family Medical History: Cancer, Deep Vein Thrombosis (DVT)


Additional Family Medical History / Comment(s): BREAST CA





General Exam


Limitations: no limitations (Well-developed, well-nourished male presents in no 

acute distress.  Initial temperature 98.2, pulse 102, respirations 18, blood 

pressure 121/75, pulse ox of 100% on room air.)


General appearance: alert, in no apparent distress (Patient does appear very 

uncomfortable)


Neck exam: Present: tenderness (Upon palpation of the right side of his neck), 

other (Decreased range of motion due to pain).  Absent: normal inspection (Large

 dressing covering mass located on the left side of the neck), full ROM


Respiratory exam: Present: normal lung sounds bilaterally.  Absent: respiratory 

distress, wheezes, rales, rhonchi, stridor


Cardiovascular Exam: Present: regular rate, normal rhythm, normal heart sounds. 

 Absent: systolic murmur, diastolic murmur, rubs, gallop, clicks


  ** Right


Shoulder Exam: Present: normal inspection, tenderness (Significant amount of 

right shoulder pain that worsens with palpation and movement).  Absent: full 

ROM, deformity, crepitus, erythema


Upper Arm exam: Present: normal inspection.  Absent: other


Elbow exam: Present: normal inspection, tenderness (Mild tenderness upon 

palpation of the right elbow diffusely).  Absent: swelling, deformity


Forearm Wrist exam: Present: normal inspection, full ROM.  Absent: tenderness, 

swelling


Hand Wrist exam: Present: normal inspection, full ROM.  Absent: tenderness, 

swelling


Neurological exam: Present: alert, oriented X3, CN II-XII intact


Skin exam: Present: warm, dry, intact, pallor





Course


                                   Vital Signs











  10/19/20 10/19/20





  15:58 18:40


 


Temperature 98.2 F 


 


Pulse Rate 102 H 82


 


Respiratory 18 16





Rate  


 


Blood Pressure 121/75 147/81


 


O2 Sat by Pulse 100 99





Oximetry  














Medical Decision Making





- Medical Decision Making





70-year-old male presents to the emergency department with complaints of right 

shoulder pain.  Denies injury.  Attributes discomfort tip or sleep positioning 

the night prior.  Patient is supporting and guarding the right upper extremity; 

unable to perform active range of motion of the right elbow and shoulder due to 

pain.  Neurovascular status is intact, +2 radial pulse, cap refill less than 3 

seconds, skin warm, pink, and dry.  X-ray of the right humerus and shoulder was 

obtained, no evidence of acute osseous abnormality.  Patient reports marked 

improvement after receiving Dilaudid and Flexeril.  Instructed to follow-up with

 his primary care provider in the next 1-2 days for recheck. Encouraged to 

return to the emergency department if unable to tolerate pain with oral 

medications.  Patient verbalizes understanding and agrees with this plan.





- Radiology Data


Radiology results: report reviewed


X-ray of the right shoulder and humerus obtained.  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  Bony mineralization 

appropriate for age.  Impression per Dr. Arreola- no acute osseous abnormality 

of the right humerus and shoulder.





Disposition


Clinical Impression: 


 Shoulder pain





Disposition: HOME SELF-CARE


Condition: Good


Instructions (If sedation given, give patient instructions):  Shoulder Pain (ED)


Additional Instructions: 


Apply warm compresses to the shoulder.  Follow-up with your primary care 

physician for recheck in 1-2 days.  Return to the emergency department 

immediately for any new, worsening, or concerning symptoms.


Prescriptions: 


Cyclobenzaprine [Flexeril] 10 mg PO TID #15 tab


Is patient prescribed a controlled substance at d/c from ED?: No


Referrals: 


Mina Mata DO [Primary Care Provider] - 1-2 days


Time of Disposition: 18:25

## 2020-10-20 ENCOUNTER — HOSPITAL ENCOUNTER (INPATIENT)
Dept: HOSPITAL 47 - EC | Age: 70
LOS: 7 days | Discharge: HOME HEALTH | DRG: 548 | End: 2020-10-27
Attending: INTERNAL MEDICINE | Admitting: INTERNAL MEDICINE
Payer: MEDICARE

## 2020-10-20 DIAGNOSIS — C79.89: ICD-10-CM

## 2020-10-20 DIAGNOSIS — M19.011: ICD-10-CM

## 2020-10-20 DIAGNOSIS — R26.2: ICD-10-CM

## 2020-10-20 DIAGNOSIS — Z51.5: ICD-10-CM

## 2020-10-20 DIAGNOSIS — Z83.2: ICD-10-CM

## 2020-10-20 DIAGNOSIS — I49.3: ICD-10-CM

## 2020-10-20 DIAGNOSIS — Z79.899: ICD-10-CM

## 2020-10-20 DIAGNOSIS — I10: ICD-10-CM

## 2020-10-20 DIAGNOSIS — Z96.641: ICD-10-CM

## 2020-10-20 DIAGNOSIS — E87.1: ICD-10-CM

## 2020-10-20 DIAGNOSIS — C44.319: ICD-10-CM

## 2020-10-20 DIAGNOSIS — K59.00: ICD-10-CM

## 2020-10-20 DIAGNOSIS — G93.41: ICD-10-CM

## 2020-10-20 DIAGNOSIS — Z87.01: ICD-10-CM

## 2020-10-20 DIAGNOSIS — J38.00: ICD-10-CM

## 2020-10-20 DIAGNOSIS — Z98.890: ICD-10-CM

## 2020-10-20 DIAGNOSIS — M00.211: Primary | ICD-10-CM

## 2020-10-20 DIAGNOSIS — C67.9: ICD-10-CM

## 2020-10-20 DIAGNOSIS — G58.8: ICD-10-CM

## 2020-10-20 DIAGNOSIS — Z77.090: ICD-10-CM

## 2020-10-20 DIAGNOSIS — D50.9: ICD-10-CM

## 2020-10-20 DIAGNOSIS — E86.0: ICD-10-CM

## 2020-10-20 DIAGNOSIS — Z92.3: ICD-10-CM

## 2020-10-20 DIAGNOSIS — Z20.828: ICD-10-CM

## 2020-10-20 DIAGNOSIS — M65.811: ICD-10-CM

## 2020-10-20 DIAGNOSIS — Z87.39: ICD-10-CM

## 2020-10-20 DIAGNOSIS — R13.10: ICD-10-CM

## 2020-10-20 DIAGNOSIS — B95.4: ICD-10-CM

## 2020-10-20 DIAGNOSIS — D63.8: ICD-10-CM

## 2020-10-20 DIAGNOSIS — C78.01: ICD-10-CM

## 2020-10-20 DIAGNOSIS — E43: ICD-10-CM

## 2020-10-20 DIAGNOSIS — Z80.3: ICD-10-CM

## 2020-10-20 DIAGNOSIS — R31.9: ICD-10-CM

## 2020-10-20 LAB
ALBUMIN SERPL-MCNC: 3.3 G/DL (ref 3.5–5)
ALP SERPL-CCNC: 88 U/L (ref 38–126)
ALT SERPL-CCNC: 34 U/L (ref 4–49)
ANION GAP SERPL CALC-SCNC: 5 MMOL/L
APTT BLD: 18.2 SEC (ref 22–30)
AST SERPL-CCNC: 28 U/L (ref 17–59)
BASOPHILS # BLD AUTO: 0 K/UL (ref 0–0.2)
BASOPHILS NFR BLD AUTO: 0 %
BUN SERPL-SCNC: 11 MG/DL (ref 9–20)
CALCIUM SPEC-MCNC: 10.5 MG/DL (ref 8.4–10.2)
CHLORIDE SERPL-SCNC: 95 MMOL/L (ref 98–107)
CO2 SERPL-SCNC: 31 MMOL/L (ref 22–30)
D DIMER PPP FEU-MCNC: 0.71 MG/L FEU (ref ?–0.6)
EOSINOPHIL # BLD AUTO: 0.1 K/UL (ref 0–0.7)
EOSINOPHIL NFR BLD AUTO: 1 %
ERYTHROCYTE [DISTWIDTH] IN BLOOD BY AUTOMATED COUNT: 3.61 M/UL (ref 4.3–5.9)
ERYTHROCYTE [DISTWIDTH] IN BLOOD: 16.7 % (ref 11.5–15.5)
GLUCOSE SERPL-MCNC: 119 MG/DL (ref 74–99)
HCT VFR BLD AUTO: 28.4 % (ref 39–53)
HGB BLD-MCNC: 8.6 GM/DL (ref 13–17.5)
INR PPP: 1 (ref ?–1.2)
LDH SPEC-CCNC: 266 U/L (ref 313–618)
LYMPHOCYTES # SPEC AUTO: 0.6 K/UL (ref 1–4.8)
LYMPHOCYTES NFR SPEC AUTO: 6 %
MAGNESIUM SPEC-SCNC: 1.9 MG/DL (ref 1.6–2.3)
MCH RBC QN AUTO: 23.8 PG (ref 25–35)
MCHC RBC AUTO-ENTMCNC: 30.2 G/DL (ref 31–37)
MCV RBC AUTO: 78.7 FL (ref 80–100)
MONOCYTES # BLD AUTO: 0.7 K/UL (ref 0–1)
MONOCYTES NFR BLD AUTO: 6 %
NEUTROPHILS # BLD AUTO: 9.2 K/UL (ref 1.3–7.7)
NEUTROPHILS NFR BLD AUTO: 86 %
PH UR: 7 [PH] (ref 5–8)
PLATELET # BLD AUTO: 349 K/UL (ref 150–450)
POTASSIUM SERPL-SCNC: 3.6 MMOL/L (ref 3.5–5.1)
PROT SERPL-MCNC: 6.5 G/DL (ref 6.3–8.2)
PROT UR QL: (no result)
PT BLD: 10.5 SEC (ref 9–12)
RBC UR QL: 40 /HPF (ref 0–5)
SODIUM SERPL-SCNC: 131 MMOL/L (ref 137–145)
SP GR UR: 1.01 (ref 1–1.03)
UROBILINOGEN UR QL STRIP: <2 MG/DL (ref ?–2)
WBC # BLD AUTO: 10.8 K/UL (ref 3.8–10.6)
WBC # UR AUTO: 8 /HPF (ref 0–5)

## 2020-10-20 PROCEDURE — 85027 COMPLETE CBC AUTOMATED: CPT

## 2020-10-20 PROCEDURE — 83550 IRON BINDING TEST: CPT

## 2020-10-20 PROCEDURE — 83615 LACTATE (LD) (LDH) ENZYME: CPT

## 2020-10-20 PROCEDURE — 89060 EXAM SYNOVIAL FLUID CRYSTALS: CPT

## 2020-10-20 PROCEDURE — 87077 CULTURE AEROBIC IDENTIFY: CPT

## 2020-10-20 PROCEDURE — 86901 BLOOD TYPING SEROLOGIC RH(D): CPT

## 2020-10-20 PROCEDURE — 76700 US EXAM ABDOM COMPLETE: CPT

## 2020-10-20 PROCEDURE — 87502 INFLUENZA DNA AMP PROBE: CPT

## 2020-10-20 PROCEDURE — 83735 ASSAY OF MAGNESIUM: CPT

## 2020-10-20 PROCEDURE — 36415 COLL VENOUS BLD VENIPUNCTURE: CPT

## 2020-10-20 PROCEDURE — 85025 COMPLETE CBC W/AUTO DIFF WBC: CPT

## 2020-10-20 PROCEDURE — 71045 X-RAY EXAM CHEST 1 VIEW: CPT

## 2020-10-20 PROCEDURE — 81001 URINALYSIS AUTO W/SCOPE: CPT

## 2020-10-20 PROCEDURE — 80048 BASIC METABOLIC PNL TOTAL CA: CPT

## 2020-10-20 PROCEDURE — 86900 BLOOD TYPING SEROLOGIC ABO: CPT

## 2020-10-20 PROCEDURE — 82607 VITAMIN B-12: CPT

## 2020-10-20 PROCEDURE — 70553 MRI BRAIN STEM W/O & W/DYE: CPT

## 2020-10-20 PROCEDURE — 87040 BLOOD CULTURE FOR BACTERIA: CPT

## 2020-10-20 PROCEDURE — 99285 EMERGENCY DEPT VISIT HI MDM: CPT

## 2020-10-20 PROCEDURE — 83921 ORGANIC ACID SINGLE QUANT: CPT

## 2020-10-20 PROCEDURE — 87102 FUNGUS ISOLATION CULTURE: CPT

## 2020-10-20 PROCEDURE — 87205 SMEAR GRAM STAIN: CPT

## 2020-10-20 PROCEDURE — 96361 HYDRATE IV INFUSION ADD-ON: CPT

## 2020-10-20 PROCEDURE — 96360 HYDRATION IV INFUSION INIT: CPT

## 2020-10-20 PROCEDURE — 93005 ELECTROCARDIOGRAM TRACING: CPT

## 2020-10-20 PROCEDURE — 36573 INSJ PICC RS&I 5 YR+: CPT

## 2020-10-20 PROCEDURE — 86140 C-REACTIVE PROTEIN: CPT

## 2020-10-20 PROCEDURE — 87070 CULTURE OTHR SPECIMN AEROBIC: CPT

## 2020-10-20 PROCEDURE — 85730 THROMBOPLASTIN TIME PARTIAL: CPT

## 2020-10-20 PROCEDURE — 82728 ASSAY OF FERRITIN: CPT

## 2020-10-20 PROCEDURE — 85379 FIBRIN DEGRADATION QUANT: CPT

## 2020-10-20 PROCEDURE — 86850 RBC ANTIBODY SCREEN: CPT

## 2020-10-20 PROCEDURE — 80053 COMPREHEN METABOLIC PANEL: CPT

## 2020-10-20 PROCEDURE — 85652 RBC SED RATE AUTOMATED: CPT

## 2020-10-20 PROCEDURE — 86920 COMPATIBILITY TEST SPIN: CPT

## 2020-10-20 PROCEDURE — 70450 CT HEAD/BRAIN W/O DYE: CPT

## 2020-10-20 PROCEDURE — 83605 ASSAY OF LACTIC ACID: CPT

## 2020-10-20 PROCEDURE — 84550 ASSAY OF BLOOD/URIC ACID: CPT

## 2020-10-20 PROCEDURE — 83540 ASSAY OF IRON: CPT

## 2020-10-20 PROCEDURE — 87075 CULTR BACTERIA EXCEPT BLOOD: CPT

## 2020-10-20 PROCEDURE — 89050 BODY FLUID CELL COUNT: CPT

## 2020-10-20 PROCEDURE — 87186 SC STD MICRODIL/AGAR DIL: CPT

## 2020-10-20 PROCEDURE — 85610 PROTHROMBIN TIME: CPT

## 2020-10-20 PROCEDURE — 76857 US EXAM PELVIC LIMITED: CPT

## 2020-10-20 NOTE — ED
Altered Mental Status HPI





- General


Chief Complaint: Altered Mental Status


Stated Complaint: Altered Mental Status


Time Seen by Provider: 10/20/20 21:45


Source: patient, family, EMS, RN notes reviewed, old records reviewed


Mode of arrival: EMS


Limitations: altered mental status





- History of Present Illness


MD Complaint: altered mental status, confusion, other (r shouldr pain)


-: days(s)


Severity: moderate


Consistency of Symptoms: waxing and waning, getting worse


Context: history of similar presentation


Associated Symptoms: weakness





- Related Data


                                Home Medications











 Medication  Instructions  Recorded  Confirmed


 


Metoprolol Tartrate [Lopressor] 12.5 mg PO HS PRN 11/22/19 10/20/20


 


Erivedge 150 mg PO DAILY 10/12/20 10/20/20


 


HYDROcodone/APAP 5-325MG [Norco 0.5 tab PO Q6HR PRN 10/12/20 10/20/20





5-325]   


 


Ondansetron [Zofran] 4 - 8 mg PO Q4HR PRN 10/12/20 10/20/20


 


hydrALAZINE HCL [Apresoline] 25 mg PO TID PRN 10/20/20 10/20/20








                                  Previous Rx's











 Medication  Instructions  Recorded


 


Cyclobenzaprine [Flexeril] 10 mg PO TID #15 tab 10/19/20











                                    Allergies











Allergy/AdvReac Type Severity Reaction Status Date / Time


 


No Known Allergies Allergy   Verified 10/20/20 22:32














Review of Systems


ROS Statement: 


Those systems with pertinent positive or pertinent negative responses have been 

documented in the HPI.





ROS Other: All systems not noted in ROS Statement are negative.





Past Medical History


Past Medical History: Cancer, Hypertension, Pneumonia


Additional Past Medical History / Comment(s): hx skin cancer, lung CA, CA in the

 lymph nodes.  radiation- last treatment April 14,2020. currently taking oral 

chemo.  constipation, anemia, growth on neck, recent hematuria


History of Any Multi-Drug Resistant Organisms: None Reported


Past Surgical History: Back Surgery, Heart Catheterization, Joint Replacement, 

Orthopedic Surgery


Additional Past Surgical History / Comment(s): PREVIOUS SKIN CA LESIONS REMOVED,

 RT HIP REPLACEMENT, left ANKLE ORIF WITH METAL , lung biopsy, neck biopsy,  

heart cath > 10 yrs ago


Past Anesthesia/Blood Transfusion Reactions: Previous Problems w/ Anesthesia


Additional Past Anesthesia/Blood Transfusion Reaction / Comment(s): Pt woke up 

violently during lung biopsy


Past Psychological History: No Psychological Hx Reported


Smoking Status: Never smoker


Past Alcohol Use History: None Reported


Past Drug Use History: None Reported





- Past Family History


  ** Mother


Family Medical History: Cancer, Deep Vein Thrombosis (DVT)


Additional Family Medical History / Comment(s): BREAST CA





General Exam


Limitations: altered mental status


General appearance: alert, in no apparent distress, anxious


Head exam: Present: atraumatic, normocephalic, normal inspection


Eye exam: Present: normal appearance, PERRL, EOMI.  Absent: scleral icterus, 

conjunctival injection, periorbital swelling


ENT exam: Present: normal exam, mucous membranes moist


Neck exam: Present: normal inspection.  Absent: tenderness, meningismus, lymph

adenopathy


Respiratory exam: Present: normal lung sounds bilaterally.  Absent: respiratory 

distress, wheezes, rales, rhonchi, stridor


Cardiovascular Exam: Present: normal rhythm, tachycardia, normal heart sounds.  

Absent: systolic murmur, diastolic murmur, rubs, gallop, clicks


GI/Abdominal exam: Present: soft, normal bowel sounds.  Absent: distended, 

tenderness, guarding, rebound, rigid


Extremities exam: Present: normal inspection, full ROM, normal capillary refill.

  Absent: tenderness, pedal edema, joint swelling, calf tenderness


Back exam: Present: normal inspection


Neurological exam: Present: alert, oriented X3, CN II-XII intact


Psychiatric exam: Present: normal affect, normal mood


Skin exam: Present: warm, dry, intact, normal color.  Absent: rash





Course


                                   Vital Signs











  10/20/20 10/21/20 10/21/20





  21:45 00:04 01:00


 


Temperature 98.6 F  98.8 F


 


Pulse Rate 109 H 101 H 


 


Pulse Rate [   96





Pulse Oximetery   





]   


 


Respiratory 18 18 





Rate   


 


Blood Pressure 125/79 133/76 


 


Blood Pressure   171/88





[Left Arm]   


 


O2 Sat by Pulse 99 99 98





Oximetry   














Medical Decision Making





- Lab Data


Result diagrams: 


                                 10/24/20 05:29





                                 10/23/20 14:53


                                   Lab Results











  10/20/20 10/20/20 10/20/20 Range/Units





  22:17 22:17 22:17 


 


WBC  10.8 H    (3.8-10.6)  k/uL


 


RBC  3.61 L    (4.30-5.90)  m/uL


 


Hgb  8.6 L D    (13.0-17.5)  gm/dL


 


Hct  28.4 L    (39.0-53.0)  %


 


MCV  78.7 L    (80.0-100.0)  fL


 


MCH  23.8 L    (25.0-35.0)  pg


 


MCHC  30.2 L    (31.0-37.0)  g/dL


 


RDW  16.7 H    (11.5-15.5)  %


 


Plt Count  349    (150-450)  k/uL


 


Neutrophils %  86    %


 


Lymphocytes %  6    %


 


Monocytes %  6    %


 


Eosinophils %  1    %


 


Basophils %  0    %


 


Neutrophils #  9.2 H    (1.3-7.7)  k/uL


 


Lymphocytes #  0.6 L    (1.0-4.8)  k/uL


 


Monocytes #  0.7    (0-1.0)  k/uL


 


Eosinophils #  0.1    (0-0.7)  k/uL


 


Basophils #  0.0    (0-0.2)  k/uL


 


Hypochromasia  Marked    


 


Anisocytosis  Slight    


 


Microcytosis  Slight    


 


ESR     (0-20)  mm/Hr


 


PT   10.5   (9.0-12.0)  sec


 


INR   1.0   (<1.2)  


 


APTT   18.2 L   (22.0-30.0)  sec


 


D-Dimer   0.71 H   (<0.60)  mg/L FEU


 


Sodium     (137-145)  mmol/L


 


Potassium     (3.5-5.1)  mmol/L


 


Chloride     ()  mmol/L


 


Carbon Dioxide     (22-30)  mmol/L


 


Anion Gap     mmol/L


 


BUN     (9-20)  mg/dL


 


Creatinine     (0.66-1.25)  mg/dL


 


Est GFR (CKD-EPI)AfAm     (>60 ml/min/1.73 sqM)  


 


Est GFR (CKD-EPI)NonAf     (>60 ml/min/1.73 sqM)  


 


BUN/Creatinine Ratio     (12.00-20.00)  Ratio


 


Glucose     (74-99)  mg/dL


 


Plasma Lactic Acid Javi     (0.7-2.0)  mmol/L


 


Uric Acid     (3.7-8.7)  mg/dL


 


Calcium     (8.4-10.2)  mg/dL


 


Magnesium     (1.6-2.3)  mg/dL


 


Iron     ()  ug/dL


 


TIBC     (228-460)  ug/dL


 


% Saturation     (15.00-50.00)   


 


Ferritin     (22.0-322.0)  ng/mL


 


Total Bilirubin     (0.2-1.3)  mg/dL


 


AST     (17-59)  U/L


 


ALT     (4-49)  U/L


 


Alkaline Phosphatase     ()  U/L


 


Lactate Dehydrogenase     (313-618)  U/L


 


C-Reactive Protein     (0.0-0.8)  mg/dL


 


Total Protein     (6.3-8.2)  g/dL


 


Albumin     (3.5-5.0)  g/dL


 


Vitamin B12     (200.0-944.0)  pg/mL


 


Urine Color    Yellow  


 


Urine Appearance    Cloudy  (Clear)  


 


Urine pH    7.0  (5.0-8.0)  


 


Ur Specific Gravity    1.012  (1.001-1.035)  


 


Urine Protein    1+ H  (Negative)  


 


Urine Glucose (UA)    Negative  (Negative)  


 


Urine Ketones    Negative  (Negative)  


 


Urine Blood    Small H  (Negative)  


 


Urine Nitrite    Negative  (Negative)  


 


Urine Bilirubin    Negative  (Negative)  


 


Urine Urobilinogen    <2.0  (<2.0)  mg/dL


 


Ur Leukocyte Esterase    Moderate H  (Negative)  


 


Urine RBC    40 H  (0-5)  /hpf


 


Urine WBC    8 H  (0-5)  /hpf


 


Amorphous Sediment    Rare H  (None)  /hpf


 


Urine Mucus    Rare H  (None)  /hpf


 


Coronavirus (PCR)     (Not Detected)  


 


Influenza Type A RNA     (Not Detectd)  


 


Influenza Type B (PCR)     (Not Detectd)  














  10/20/20 10/20/20 10/20/20 Range/Units





  22:17 22:17 22:17 


 


WBC     (3.8-10.6)  k/uL


 


RBC     (4.30-5.90)  m/uL


 


Hgb     (13.0-17.5)  gm/dL


 


Hct     (39.0-53.0)  %


 


MCV     (80.0-100.0)  fL


 


MCH     (25.0-35.0)  pg


 


MCHC     (31.0-37.0)  g/dL


 


RDW     (11.5-15.5)  %


 


Plt Count     (150-450)  k/uL


 


Neutrophils %     %


 


Lymphocytes %     %


 


Monocytes %     %


 


Eosinophils %     %


 


Basophils %     %


 


Neutrophils #     (1.3-7.7)  k/uL


 


Lymphocytes #     (1.0-4.8)  k/uL


 


Monocytes #     (0-1.0)  k/uL


 


Eosinophils #     (0-0.7)  k/uL


 


Basophils #     (0-0.2)  k/uL


 


Hypochromasia     


 


Anisocytosis     


 


Microcytosis     


 


ESR     (0-20)  mm/Hr


 


PT     (9.0-12.0)  sec


 


INR     (<1.2)  


 


APTT     (22.0-30.0)  sec


 


D-Dimer     (<0.60)  mg/L FEU


 


Sodium  131 L    (137-145)  mmol/L


 


Potassium  3.6    (3.5-5.1)  mmol/L


 


Chloride  95 L    ()  mmol/L


 


Carbon Dioxide  31 H    (22-30)  mmol/L


 


Anion Gap  5    mmol/L


 


BUN  11    (9-20)  mg/dL


 


Creatinine  0.80    (0.66-1.25)  mg/dL


 


Est GFR (CKD-EPI)AfAm  >90    (>60 ml/min/1.73 sqM)  


 


Est GFR (CKD-EPI)NonAf  >90    (>60 ml/min/1.73 sqM)  


 


BUN/Creatinine Ratio     (12.00-20.00)  Ratio


 


Glucose  119 H    (74-99)  mg/dL


 


Plasma Lactic Acid Javi   1.7   (0.7-2.0)  mmol/L


 


Uric Acid     (3.7-8.7)  mg/dL


 


Calcium  10.5 H    (8.4-10.2)  mg/dL


 


Magnesium  1.9    (1.6-2.3)  mg/dL


 


Iron     ()  ug/dL


 


TIBC     (228-460)  ug/dL


 


% Saturation     (15.00-50.00)   


 


Ferritin     (22.0-322.0)  ng/mL


 


Total Bilirubin  1.0    (0.2-1.3)  mg/dL


 


AST  28    (17-59)  U/L


 


ALT  34    (4-49)  U/L


 


Alkaline Phosphatase  88    ()  U/L


 


Lactate Dehydrogenase  266 L    (313-618)  U/L


 


C-Reactive Protein     (0.0-0.8)  mg/dL


 


Total Protein  6.5    (6.3-8.2)  g/dL


 


Albumin  3.3 L    (3.5-5.0)  g/dL


 


Vitamin B12     (200.0-944.0)  pg/mL


 


Urine Color     


 


Urine Appearance     (Clear)  


 


Urine pH     (5.0-8.0)  


 


Ur Specific Gravity     (1.001-1.035)  


 


Urine Protein     (Negative)  


 


Urine Glucose (UA)     (Negative)  


 


Urine Ketones     (Negative)  


 


Urine Blood     (Negative)  


 


Urine Nitrite     (Negative)  


 


Urine Bilirubin     (Negative)  


 


Urine Urobilinogen     (<2.0)  mg/dL


 


Ur Leukocyte Esterase     (Negative)  


 


Urine RBC     (0-5)  /hpf


 


Urine WBC     (0-5)  /hpf


 


Amorphous Sediment     (None)  /hpf


 


Urine Mucus     (None)  /hpf


 


Coronavirus (PCR)     (Not Detected)  


 


Influenza Type A RNA    Not Detected  (Not Detectd)  


 


Influenza Type B (PCR)    Not Detected  (Not Detectd)  














  10/20/20 10/21/20 10/21/20 Range/Units





  22:17 15:21 15:21 


 


WBC   10.5   (3.8-10.6)  k/uL


 


RBC   3.91 L   (4.30-5.90)  m/uL


 


Hgb   9.1 L   (13.0-17.5)  gm/dL


 


Hct   30.6 L   (39.0-53.0)  %


 


MCV   78.3 L   (80.0-100.0)  fL


 


MCH   23.4 L   (25.0-35.0)  pg


 


MCHC   29.9 L   (31.0-37.0)  g/dL


 


RDW   16.3 H   (11.5-15.5)  %


 


Plt Count   270   (150-450)  k/uL


 


Neutrophils %   89   %


 


Lymphocytes %   3   %


 


Monocytes %   6   %


 


Eosinophils %   0   %


 


Basophils %   0   %


 


Neutrophils #   9.4 H   (1.3-7.7)  k/uL


 


Lymphocytes #   0.3 L   (1.0-4.8)  k/uL


 


Monocytes #   0.6   (0-1.0)  k/uL


 


Eosinophils #   0.0   (0-0.7)  k/uL


 


Basophils #   0.0   (0-0.2)  k/uL


 


Hypochromasia   Marked   


 


Anisocytosis   Slight   


 


Microcytosis   Slight   


 


ESR     (0-20)  mm/Hr


 


PT     (9.0-12.0)  sec


 


INR     (<1.2)  


 


APTT     (22.0-30.0)  sec


 


D-Dimer     (<0.60)  mg/L FEU


 


Sodium     (137-145)  mmol/L


 


Potassium     (3.5-5.1)  mmol/L


 


Chloride     ()  mmol/L


 


Carbon Dioxide     (22-30)  mmol/L


 


Anion Gap     mmol/L


 


BUN     (9-20)  mg/dL


 


Creatinine     (0.66-1.25)  mg/dL


 


Est GFR (CKD-EPI)AfAm     (>60 ml/min/1.73 sqM)  


 


Est GFR (CKD-EPI)NonAf     (>60 ml/min/1.73 sqM)  


 


BUN/Creatinine Ratio     (12.00-20.00)  Ratio


 


Glucose     (74-99)  mg/dL


 


Plasma Lactic Acid Javi     (0.7-2.0)  mmol/L


 


Uric Acid    3.5 L  (3.7-8.7)  mg/dL


 


Calcium     (8.4-10.2)  mg/dL


 


Magnesium     (1.6-2.3)  mg/dL


 


Iron     ()  ug/dL


 


TIBC     (228-460)  ug/dL


 


% Saturation     (15.00-50.00)   


 


Ferritin     (22.0-322.0)  ng/mL


 


Total Bilirubin     (0.2-1.3)  mg/dL


 


AST     (17-59)  U/L


 


ALT     (4-49)  U/L


 


Alkaline Phosphatase     ()  U/L


 


Lactate Dehydrogenase     (313-618)  U/L


 


C-Reactive Protein     (0.0-0.8)  mg/dL


 


Total Protein     (6.3-8.2)  g/dL


 


Albumin     (3.5-5.0)  g/dL


 


Vitamin B12     (200.0-944.0)  pg/mL


 


Urine Color     


 


Urine Appearance     (Clear)  


 


Urine pH     (5.0-8.0)  


 


Ur Specific Gravity     (1.001-1.035)  


 


Urine Protein     (Negative)  


 


Urine Glucose (UA)     (Negative)  


 


Urine Ketones     (Negative)  


 


Urine Blood     (Negative)  


 


Urine Nitrite     (Negative)  


 


Urine Bilirubin     (Negative)  


 


Urine Urobilinogen     (<2.0)  mg/dL


 


Ur Leukocyte Esterase     (Negative)  


 


Urine RBC     (0-5)  /hpf


 


Urine WBC     (0-5)  /hpf


 


Amorphous Sediment     (None)  /hpf


 


Urine Mucus     (None)  /hpf


 


Coronavirus (PCR)  Not Detected    (Not Detected)  


 


Influenza Type A RNA     (Not Detectd)  


 


Influenza Type B (PCR)     (Not Detectd)  














  10/22/20 10/22/20 10/22/20 Range/Units





  06:39 06:39 06:39 


 


WBC   9.4   (3.8-10.6)  k/uL


 


RBC   3.32 L   (4.30-5.90)  m/uL


 


Hgb   8.1 L   (13.0-17.5)  gm/dL


 


Hct   26.1 L   (39.0-53.0)  %


 


MCV   78.5 L   (80.0-100.0)  fL


 


MCH   24.4 L   (25.0-35.0)  pg


 


MCHC   31.0   (31.0-37.0)  g/dL


 


RDW   16.4 H   (11.5-15.5)  %


 


Plt Count   283   (150-450)  k/uL


 


Neutrophils %     %


 


Lymphocytes %     %


 


Monocytes %     %


 


Eosinophils %     %


 


Basophils %     %


 


Neutrophils #     (1.3-7.7)  k/uL


 


Lymphocytes #     (1.0-4.8)  k/uL


 


Monocytes #     (0-1.0)  k/uL


 


Eosinophils #     (0-0.7)  k/uL


 


Basophils #     (0-0.2)  k/uL


 


Hypochromasia   Marked   


 


Anisocytosis   Slight   


 


Microcytosis   Slight   


 


ESR    106 H  (0-20)  mm/Hr


 


PT     (9.0-12.0)  sec


 


INR     (<1.2)  


 


APTT     (22.0-30.0)  sec


 


D-Dimer     (<0.60)  mg/L FEU


 


Sodium  133 L    (137-145)  mmol/L


 


Potassium  3.2 L    (3.5-5.1)  mmol/L


 


Chloride  94 L    ()  mmol/L


 


Carbon Dioxide  30.9    (22-30)  mmol/L


 


Anion Gap  8.10    mmol/L


 


BUN  11.0    (9-20)  mg/dL


 


Creatinine  0.7    (0.66-1.25)  mg/dL


 


Est GFR (CKD-EPI)AfAm  112.4    (>60 ml/min/1.73 sqM)  


 


Est GFR (CKD-EPI)NonAf  97.0    (>60 ml/min/1.73 sqM)  


 


BUN/Creatinine Ratio  15.71    (12.00-20.00)  Ratio


 


Glucose  102    (74-99)  mg/dL


 


Plasma Lactic Acid Javi     (0.7-2.0)  mmol/L


 


Uric Acid     (3.7-8.7)  mg/dL


 


Calcium  9.8    (8.4-10.2)  mg/dL


 


Magnesium     (1.6-2.3)  mg/dL


 


Iron  3 L    ()  ug/dL


 


TIBC  246    (228-460)  ug/dL


 


% Saturation  1.22 L    (15.00-50.00)   


 


Ferritin  73.4    (22.0-322.0)  ng/mL


 


Total Bilirubin     (0.2-1.3)  mg/dL


 


AST     (17-59)  U/L


 


ALT     (4-49)  U/L


 


Alkaline Phosphatase     ()  U/L


 


Lactate Dehydrogenase  201    (313-618)  U/L


 


C-Reactive Protein     (0.0-0.8)  mg/dL


 


Total Protein     (6.3-8.2)  g/dL


 


Albumin     (3.5-5.0)  g/dL


 


Vitamin B12  434.0    (200.0-944.0)  pg/mL


 


Urine Color     


 


Urine Appearance     (Clear)  


 


Urine pH     (5.0-8.0)  


 


Ur Specific Gravity     (1.001-1.035)  


 


Urine Protein     (Negative)  


 


Urine Glucose (UA)     (Negative)  


 


Urine Ketones     (Negative)  


 


Urine Blood     (Negative)  


 


Urine Nitrite     (Negative)  


 


Urine Bilirubin     (Negative)  


 


Urine Urobilinogen     (<2.0)  mg/dL


 


Ur Leukocyte Esterase     (Negative)  


 


Urine RBC     (0-5)  /hpf


 


Urine WBC     (0-5)  /hpf


 


Amorphous Sediment     (None)  /hpf


 


Urine Mucus     (None)  /hpf


 


Coronavirus (PCR)     (Not Detected)  


 


Influenza Type A RNA     (Not Detectd)  


 


Influenza Type B (PCR)     (Not Detectd)  














  10/22/20 Range/Units





  06:39 


 


WBC   (3.8-10.6)  k/uL


 


RBC   (4.30-5.90)  m/uL


 


Hgb   (13.0-17.5)  gm/dL


 


Hct   (39.0-53.0)  %


 


MCV   (80.0-100.0)  fL


 


MCH   (25.0-35.0)  pg


 


MCHC   (31.0-37.0)  g/dL


 


RDW   (11.5-15.5)  %


 


Plt Count   (150-450)  k/uL


 


Neutrophils %   %


 


Lymphocytes %   %


 


Monocytes %   %


 


Eosinophils %   %


 


Basophils %   %


 


Neutrophils #   (1.3-7.7)  k/uL


 


Lymphocytes #   (1.0-4.8)  k/uL


 


Monocytes #   (0-1.0)  k/uL


 


Eosinophils #   (0-0.7)  k/uL


 


Basophils #   (0-0.2)  k/uL


 


Hypochromasia   


 


Anisocytosis   


 


Microcytosis   


 


ESR   (0-20)  mm/Hr


 


PT   (9.0-12.0)  sec


 


INR   (<1.2)  


 


APTT   (22.0-30.0)  sec


 


D-Dimer   (<0.60)  mg/L FEU


 


Sodium   (137-145)  mmol/L


 


Potassium   (3.5-5.1)  mmol/L


 


Chloride   ()  mmol/L


 


Carbon Dioxide   (22-30)  mmol/L


 


Anion Gap   mmol/L


 


BUN   (9-20)  mg/dL


 


Creatinine   (0.66-1.25)  mg/dL


 


Est GFR (CKD-EPI)AfAm   (>60 ml/min/1.73 sqM)  


 


Est GFR (CKD-EPI)NonAf   (>60 ml/min/1.73 sqM)  


 


BUN/Creatinine Ratio   (12.00-20.00)  Ratio


 


Glucose   (74-99)  mg/dL


 


Plasma Lactic Acid Javi   (0.7-2.0)  mmol/L


 


Uric Acid   (3.7-8.7)  mg/dL


 


Calcium   (8.4-10.2)  mg/dL


 


Magnesium   (1.6-2.3)  mg/dL


 


Iron   ()  ug/dL


 


TIBC   (228-460)  ug/dL


 


% Saturation   (15.00-50.00)   


 


Ferritin   (22.0-322.0)  ng/mL


 


Total Bilirubin   (0.2-1.3)  mg/dL


 


AST   (17-59)  U/L


 


ALT   (4-49)  U/L


 


Alkaline Phosphatase   ()  U/L


 


Lactate Dehydrogenase   (313-618)  U/L


 


C-Reactive Protein  22.1 H  (0.0-0.8)  mg/dL


 


Total Protein   (6.3-8.2)  g/dL


 


Albumin   (3.5-5.0)  g/dL


 


Vitamin B12   (200.0-944.0)  pg/mL


 


Urine Color   


 


Urine Appearance   (Clear)  


 


Urine pH   (5.0-8.0)  


 


Ur Specific Gravity   (1.001-1.035)  


 


Urine Protein   (Negative)  


 


Urine Glucose (UA)   (Negative)  


 


Urine Ketones   (Negative)  


 


Urine Blood   (Negative)  


 


Urine Nitrite   (Negative)  


 


Urine Bilirubin   (Negative)  


 


Urine Urobilinogen   (<2.0)  mg/dL


 


Ur Leukocyte Esterase   (Negative)  


 


Urine RBC   (0-5)  /hpf


 


Urine WBC   (0-5)  /hpf


 


Amorphous Sediment   (None)  /hpf


 


Urine Mucus   (None)  /hpf


 


Coronavirus (PCR)   (Not Detected)  


 


Influenza Type A RNA   (Not Detectd)  


 


Influenza Type B (PCR)   (Not Detectd)  














- EKG Data


-: EKG Interpreted by Me (EKG sinus tach 101   QTc 469)





Disposition


Clinical Impression: 


 Basal cell carcinoma, Anemia, Altered mental status, Weakness





Disposition: ADMITTED AS IP TO THIS HOSP


Condition: Fair


Is patient prescribed a controlled substance at d/c from ED?: No

## 2020-10-20 NOTE — CT
EXAMINATION TYPE: CT brain wo con

 

DATE OF EXAM: 10/20/2020

 

COMPARISON: 11/22/2019

 

HISTORY: AMS

 

CT DLP: 1157.4 mGycm

Automated exposure control for dose reduction was used.

 

There is cerebral cortical atrophy. There is no mass effect nor midline shift. There is no sign of in
tracranial hemorrhage. The calvarium is intact. Skull base is intact.

 

IMPRESSION:

Cerebral atrophy appropriate for age. No acute intracranial abnormality. No change.

## 2020-10-20 NOTE — XR
EXAMINATION TYPE: XR chest 1V portable

 

DATE OF EXAM: 10/20/2020

 

COMPARISON: 11/22/2019

 

HISTORY: Confusion

 

TECHNIQUE: Single view

 

FINDINGS: There is some linear density right upper lobe. Heart size is normal. There are no hilar mas
ses. Costophrenic angles are clear. There is no heart failure. Bony thorax is intact.

 

IMPRESSION: Chronic linear density right upper lobe consistent with scarring or atelectasis. No heart
 failure. No change.

## 2020-10-21 LAB
BASOPHILS # BLD AUTO: 0 K/UL (ref 0–0.2)
BASOPHILS NFR BLD AUTO: 0 %
EOSINOPHIL # BLD AUTO: 0 K/UL (ref 0–0.7)
EOSINOPHIL NFR BLD AUTO: 0 %
ERYTHROCYTE [DISTWIDTH] IN BLOOD BY AUTOMATED COUNT: 3.91 M/UL (ref 4.3–5.9)
ERYTHROCYTE [DISTWIDTH] IN BLOOD: 16.3 % (ref 11.5–15.5)
HCT VFR BLD AUTO: 30.6 % (ref 39–53)
HGB BLD-MCNC: 9.1 GM/DL (ref 13–17.5)
LYMPHOCYTES # SPEC AUTO: 0.3 K/UL (ref 1–4.8)
LYMPHOCYTES NFR SPEC AUTO: 3 %
MCH RBC QN AUTO: 23.4 PG (ref 25–35)
MCHC RBC AUTO-ENTMCNC: 29.9 G/DL (ref 31–37)
MCV RBC AUTO: 78.3 FL (ref 80–100)
MONOCYTES # BLD AUTO: 0.6 K/UL (ref 0–1)
MONOCYTES NFR BLD AUTO: 6 %
NEUTROPHILS # BLD AUTO: 9.4 K/UL (ref 1.3–7.7)
NEUTROPHILS NFR BLD AUTO: 89 %
PLATELET # BLD AUTO: 270 K/UL (ref 150–450)
WBC # BLD AUTO: 10.5 K/UL (ref 3.8–10.6)

## 2020-10-21 RX ADMIN — CYCLOBENZAPRINE HYDROCHLORIDE SCH MG: 10 TABLET, FILM COATED ORAL at 10:17

## 2020-10-21 RX ADMIN — CYCLOBENZAPRINE HYDROCHLORIDE SCH: 10 TABLET, FILM COATED ORAL at 10:19

## 2020-10-21 RX ADMIN — CEFAZOLIN SCH MLS/HR: 330 INJECTION, POWDER, FOR SOLUTION INTRAMUSCULAR; INTRAVENOUS at 19:51

## 2020-10-21 RX ADMIN — HEPARIN SODIUM SCH UNIT: 5000 INJECTION, SOLUTION INTRAVENOUS; SUBCUTANEOUS at 19:51

## 2020-10-21 NOTE — CT
EXAMINATION TYPE: CT shoulder RT wo con

 

DATE OF EXAM: 10/21/2020

 

COMPARISON: Radiographs 10/19/2020.

 

HISTORY: right shoulder pain, no injury

 

CT DLP: 512.9 mGycm

Axial CT images of the right shoulder was performed without contrast. Coronal, sagittal and 3-D refor
mats were generated and reviewed. Automated exposure control for dose reduction was used.

 

FINDINGS: 

There is no acute fracture or dislocation.

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 There is heterogen
eous, moderate right glenohumeral joint effusion. There are 2 small sclerotic foci in the humeral hea
d measuring up to 6 mm without aggressive features are most consistent with bone island in the absenc
e of known malignancy.

 

There is mild periarticular shoulder edema.

 

IMPRESSION: 

MODERATE RIGHT GLENOHUMERAL JOINT EFFUSION WITH SUSPECTED SYNOVITIS. If there is clinical concern for
 septic arthritis, recommend joint sampling.

 

Otherwise no acute osseous abnormality.

## 2020-10-21 NOTE — P.HPIM
History of Present Illness


H&P Date: 10/21/20


Chief Complaint: Weakness





HISTORY OF PRESENT ILLNESS


This is a 70-year-old  male patient of Dr. Mata and Dr. Bundy 

with past medical history significant for hypertension, basal cell skin cancer 

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 

palliative radiation therapy in 2019.  On 10/14/2020, patient underwent 

cystoscopy, transurethral resection of bladder tumors with Dr. Alex. Pathology

report positive for high-grade papillary urothelial carcinoma infiltrating the 

lamina propria and sample fragments of muscularis propria.


Patient is a poor historian.  He states that he was seen in the emergency center

on  for pinched nerve in his right arm.  He denies any injury.  He 

states he rolled over in bed and the pain developed.  X-rays of the right 

shoulder and humerus did not show any acute abnormality.  Patient was discharged

home on Flexeril.  Patient states that he is due to see Dr. Alex today in his 

office to have Sandoval catheter removed.





Patient returned yesterday with inability to walk, no appetite unable to care 

for himself.  Patient complains of nausea with loss of appetite and difficulty 

swallowing due to left neck mass.  Chest x-ray revealed chronic linear density 

right upper lobe consistent with scarring or atelectasis.  No heart failure.  No

change.  CAT scan of the brain revealed cerebral atrophy.  No acute intracranial

abnormality.  EKG is a sinus rhythm with occasional PVCs, heart rate 101.  WBC 

10.8, hemoglobin 8.6, platelet count 349.  D-dimer 0.71.  Sodium 131, potassium 

3.6, chloride 95, CO2 31, BUN 11 creatinine 0.8.  Liver function tests were 

normal.  .  Urinalysis cloudy, blood small, leukoesterase moderate, 

nitrate negative.  RBCs 40, wbc's 8.  Influenza testing negative.  Patient was 

given 1 L of IV fluids, placed on the [facility] floor and consults requested with 

oncology, urology, orthopedics. 





REVIEW OF SYSTEMS


Constitutional: No fever, no chills, no night sweats.  Reports weight change.  

Reports weakness, Reportsfatigue Reports lethargy.  No daytime sleepiness.


EENT: No headache.  No blurred vision or double vision, no loss of vision.  

Reports dizziness.  No nasal drainage or congestion.  No epistaxis.  Reports 

dysphagia.


Lungs: No shortness of breath, cough, no sputum production.  No wheezing.


Cardiovascular: No chest pain, no lower extremity edema.  No palpitations.  No 

paroxysmal nocturnal dyspnea.  No orthopnea.  Reports lightheadedness or 

dizziness.  No syncopal episodes.


Abdominal: No abdominal pain.  No nausea, vomiting.  No diarrhea.  No 

constipation.  No bloody or tarry stools..  No loss of appetite.


Genitourinary: No dysuria, increased frequency, urgency.  No urinary retention.


Musculoskeletal: No myalgias.  Reports muscle weakness, Reports gait 

dysfunction.  No back pain.  Reports neck pain.


Integumentary: Reports wounds, no lesions.  No rash or pruritus.  No unusual 

bruising.  No change in hair or nails.


Neurologic: Reports dysphagia. No facial droop.  Mild change in mentation. No 

head injury. No headache. No paralysis. No paresthesia.


Psychiatric: No depression.  No anxiety.  No mood swings.


Endocrine: No abnormal blood sugars.  Reports weight change.  





SOCIAL HISTORY


Patient is a lifelong nonsmoker but had extensive exposure to asbestos and acid 

at [employer] as well as plastic exposure and [employer].





FAMILY HISTORY


Mother  at age 85 from old age.  Father is alive at age 93 residing at 

extended care facility.  Patient does not know his father's medical history.  

Patient does not have any brothers.  Patient has one sister with unknown medical

conditions.  Patient has 3 sons with no major medical problems.





PHYSICAL EXAMINATION


Gen: This is a 70-year-old  male.  He is resting in bed appears to be 

comfortable.


HEENT: Head is atraumatic, normocephalic. Pupils equal, round. Sclerae is 

anicteric.  Open wounds/ulcerations the left neck region. 


NECK: Supple. No JVD. No lymphadenopathy. No thyromegaly. 


LUNGS: Clear to auscultation. No wheezes or rhonchi.  No intercostal 

retractions.


HEART: Regular rate and rhythm. No murmur. 


ABDOMEN: Soft. Bowel sounds are present. No masses.  No tenderness.  Sandoval 

catheter draining clear clementine urine.


EXTREMITIES: No pedal edema.  No calf tenderness.


NEUROLOGICAL: Patient is awake, alert and oriented x3. Cranial nerves 2 through 

12 are grossly intact. 





ASSESSMENT AND PLAN


1.  Generalized debility, weight loss, loss of appetite and weakness secondary 

to cancer.  PT, OT, speech therapy consultations.





2.  Basal cell skin cancer of the left cheek with metastatic disease to the neck

and lungs status post palliative radiation therapy, under the care of Dr. Posada.  

Consult with Dr Posada.





3.  Bladder cancer status post recent transurethral resection of bladder tumors.

 Consult with Dr. Alex.  Patient is due today for Sandoval catheter removal and 

follow-up in the office.





4.  Right shoulder pain.  Orthopedic consult.  X-rays negative for acute 

findings.





5.  Severe protein calorie malnutrition with weight loss, loss of appetite and 

dysphagia secondary to neck tumor.  Regular diet and ensure twice daily.  Speech

therapy.





6.  Anemia of chronic disease.





7.  Hyponatremia secondary to malnutrition.  Continue IV fluids 0.9 normal 

saline at 50 mL per hour.





8.  Hypertension.  Continue home medications which are hydralazine 25 mg 3 times

daily as needed and Lopressor 12.5 mg at bedtime as needed.





9.  Mild metabolic encephalopathy secondary to underlying cancers.  





10.  GI prophylaxis.  Protonix daily.





11.  DVT prophylaxis.  Heparin subcu.





Patient will be admitted to the hospital for a minimum of 2 night stay.





Discharge plan: To be determined.  PT, OT, speech therapy.





Impression and plan of care have been directed as dictated by the signing 

physician.  Haley Lucas nurse practitioner acting as scribe for signing 

physician











Past Medical History


Past Medical History: Cancer, Hypertension, Pneumonia


Additional Past Medical History / Comment(s): hx skin cancer, lung CA, CA in the

lymph nodes.  radiation- last treatment . currently taking oral 

chemo.  constipation, anemia, growth on neck, recent hematuria


History of Any Multi-Drug Resistant Organisms: None Reported


Past Surgical History: Back Surgery, Heart Catheterization, Joint Replacement, 

Orthopedic Surgery


Additional Past Surgical History / Comment(s): PREVIOUS SKIN CA LESIONS REMOVED,

RT HIP REPLACEMENT, left ANKLE ORIF WITH METAL , lung biopsy, neck biopsy,  

heart cath > 10 yrs ago


Past Anesthesia/Blood Transfusion Reactions: Previous Problems w/ Anesthesia


Additional Past Anesthesia/Blood Transfusion Reaction / Comment(s): Pt woke up 

violently during lung biopsy


Past Psychological History: No Psychological Hx Reported


Smoking Status: Never smoker


Past Alcohol Use History: None Reported


Additional Past Alcohol Use History / Comment(s): Patient is a lifelong 

nonsmoker but had extensive exposure to asbestos and acid at [employer] as 

well as plastic exposure and [employer].


Past Drug Use History: None Reported





- Past Family History


  ** Mother


Family Medical History: Cancer, Deep Vein Thrombosis (DVT)


Additional Family Medical History / Comment(s): BREAST CA





Medications and Allergies


                                Home Medications











 Medication  Instructions  Recorded  Confirmed  Type


 


Metoprolol Tartrate [Lopressor] 12.5 mg PO HS PRN 11/22/19 10/20/20 History


 


Erivedge 150 mg PO DAILY 10/12/20 10/20/20 History


 


HYDROcodone/APAP 5-325MG [Norco 0.5 tab PO Q6HR PRN 10/12/20 10/20/20 History





5-325]    


 


Ondansetron [Zofran] 4 - 8 mg PO Q4HR PRN 10/12/20 10/20/20 History


 


Cyclobenzaprine [Flexeril] 10 mg PO TID #15 tab 10/19/20 10/20/20 Rx


 


hydrALAZINE HCL [Apresoline] 25 mg PO TID PRN 10/20/20 10/20/20 History








                                    Allergies











Allergy/AdvReac Type Severity Reaction Status Date / Time


 


No Known Allergies Allergy   Verified 10/20/20 22:32














Physical Exam


Vitals: 


                                   Vital Signs











  Temp Pulse Pulse Resp BP BP Pulse Ox


 


 10/21/20 08:00    93  18   


 


 10/21/20 07:00  99.7 F H   93    179/84  97


 


 10/21/20 01:00  98.8 F   96    171/88  98


 


 10/21/20 00:04   101 H   18  133/76   99


 


 10/20/20 21:45  98.6 F  109 H   18  125/79   99








                                Intake and Output











 10/20/20 10/21/20 10/21/20





 22:59 06:59 14:59


 


Output Total  850 850


 


Balance  -850 -850


 


Output:   


 


  Urine  850 850


 


Other:   


 


  Voiding Method  Indwelling Catheter Indwelling Catheter


 


  Weight 61.235 kg 61.235 kg 














Results


CBC & Chem 7: 


                                 10/20/20 22:17





                                 10/20/20 22:17


Labs: 


                  Abnormal Lab Results - Last 24 Hours (Table)











  10/20/20 10/20/20 10/20/20 Range/Units





  22:17 22:17 22:17 


 


WBC  10.8 H    (3.8-10.6)  k/uL


 


RBC  3.61 L    (4.30-5.90)  m/uL


 


Hgb  8.6 L D    (13.0-17.5)  gm/dL


 


Hct  28.4 L    (39.0-53.0)  %


 


MCV  78.7 L    (80.0-100.0)  fL


 


MCH  23.8 L    (25.0-35.0)  pg


 


MCHC  30.2 L    (31.0-37.0)  g/dL


 


RDW  16.7 H    (11.5-15.5)  %


 


Neutrophils #  9.2 H    (1.3-7.7)  k/uL


 


Lymphocytes #  0.6 L    (1.0-4.8)  k/uL


 


APTT   18.2 L   (22.0-30.0)  sec


 


D-Dimer   0.71 H   (<0.60)  mg/L FEU


 


Sodium     (137-145)  mmol/L


 


Chloride     ()  mmol/L


 


Carbon Dioxide     (22-30)  mmol/L


 


Glucose     (74-99)  mg/dL


 


Calcium     (8.4-10.2)  mg/dL


 


Lactate Dehydrogenase     (313-618)  U/L


 


Albumin     (3.5-5.0)  g/dL


 


Urine Protein    1+ H  (Negative)  


 


Urine Blood    Small H  (Negative)  


 


Ur Leukocyte Esterase    Moderate H  (Negative)  


 


Urine RBC    40 H  (0-5)  /hpf


 


Urine WBC    8 H  (0-5)  /hpf


 


Amorphous Sediment    Rare H  (None)  /hpf


 


Urine Mucus    Rare H  (None)  /hpf














  10/20/20 Range/Units





  22:17 


 


WBC   (3.8-10.6)  k/uL


 


RBC   (4.30-5.90)  m/uL


 


Hgb   (13.0-17.5)  gm/dL


 


Hct   (39.0-53.0)  %


 


MCV   (80.0-100.0)  fL


 


MCH   (25.0-35.0)  pg


 


MCHC   (31.0-37.0)  g/dL


 


RDW   (11.5-15.5)  %


 


Neutrophils #   (1.3-7.7)  k/uL


 


Lymphocytes #   (1.0-4.8)  k/uL


 


APTT   (22.0-30.0)  sec


 


D-Dimer   (<0.60)  mg/L FEU


 


Sodium  131 L  (137-145)  mmol/L


 


Chloride  95 L  ()  mmol/L


 


Carbon Dioxide  31 H  (22-30)  mmol/L


 


Glucose  119 H  (74-99)  mg/dL


 


Calcium  10.5 H  (8.4-10.2)  mg/dL


 


Lactate Dehydrogenase  266 L  (313-618)  U/L


 


Albumin  3.3 L  (3.5-5.0)  g/dL


 


Urine Protein   (Negative)  


 


Urine Blood   (Negative)  


 


Ur Leukocyte Esterase   (Negative)  


 


Urine RBC   (0-5)  /hpf


 


Urine WBC   (0-5)  /hpf


 


Amorphous Sediment   (None)  /hpf


 


Urine Mucus   (None)  /hpf














Thrombosis Risk Factor Assmnt





- Choose All That Apply


Each Risk Factor Represents 2 Points: Age 61-74 years


Thrombosis Risk Factor Assessment Total Risk Factor Score: 2


Thrombosis Risk Factor Assessment Level: Low Risk

## 2020-10-21 NOTE — P.GSCN
History of Present Illness


Consult date: 10/21/20


Reason for Consult: 





Bladder cancer


History of present illness: 





Mr. Davila is a 70-year-old male admitted the hospital with altered mental statu

s, weakness and right shoulder pain.  Of note he had a TURBT done on October 15 

by Dr. Cardona.  His pathology came back muscle invasive bladder cancer.  He has 

had a catheter in place since his surgery.  He denies any abdominal pain, any 

gross hematuria, or any dysuria or flank pain.  He was scheduled for open 

reduction meters today for pathology review and for catheter removal.





Review of Systems


ROS unobtainable: due to mental status





Past Medical History


Past Medical History: Cancer, Hypertension, Pneumonia


Additional Past Medical History / Comment(s): hx skin cancer, lung CA, CA in the

lymph nodes.  radiation- last treatment April 14,2020. currently taking oral 

chemo.  constipation, anemia, growth on neck, recent hematuria


History of Any Multi-Drug Resistant Organisms: None Reported


Past Surgical History: Back Surgery, Heart Catheterization, Joint Replacement, 

Orthopedic Surgery


Additional Past Surgical History / Comment(s): PREVIOUS SKIN CA LESIONS REMOVED,

RT HIP REPLACEMENT, left ANKLE ORIF WITH METAL , lung biopsy, neck biopsy,  

heart cath > 10 yrs ago


Past Anesthesia/Blood Transfusion Reactions: Previous Problems w/ Anesthesia


Additional Past Anesthesia/Blood Transfusion Reaction / Comm: Pt woke up 

violently during lung biopsy


Past Psychological History: No Psychological Hx Reported


Smoking Status: Never smoker


Past Alcohol Use History: None Reported


Additional Past Alcohol Use History / Comment(s): Patient is a lifelong 

nonsmoker but had extensive exposure to asbestos and acid at [employer] as 

well as plastic exposure and [employer].


Past Drug Use History: None Reported





- Past Family History


  ** Mother


Family Medical History: Cancer, Deep Vein Thrombosis (DVT)


Additional Family Medical History / Comment(s): BREAST CA





Medications and Allergies


                                Home Medications











 Medication  Instructions  Recorded  Confirmed  Type


 


Metoprolol Tartrate [Lopressor] 12.5 mg PO HS PRN 11/22/19 10/20/20 History


 


Erivedge 150 mg PO DAILY 10/12/20 10/20/20 History


 


HYDROcodone/APAP 5-325MG [Norco 0.5 tab PO Q6HR PRN 10/12/20 10/20/20 History





5-325]    


 


Ondansetron [Zofran] 4 - 8 mg PO Q4HR PRN 10/12/20 10/20/20 History


 


Cyclobenzaprine [Flexeril] 10 mg PO TID #15 tab 10/19/20 10/20/20 Rx


 


hydrALAZINE HCL [Apresoline] 25 mg PO TID PRN 10/20/20 10/20/20 History








                                    Allergies











Allergy/AdvReac Type Severity Reaction Status Date / Time


 


No Known Allergies Allergy   Verified 10/20/20 22:32














Surgical - Exam


                                   Vital Signs











Temp Pulse Resp BP Pulse Ox


 


 98.6 F   109 H  18   125/79   99 


 


 10/20/20 21:45  10/20/20 21:45  10/20/20 21:45  10/20/20 21:45  10/20/20 21:45














- General


no distress, no pain





- ENT


normal nares, normal mucosa





- Respiratory


normal expansion, normal respiratory effort





- Abdomen


Abdomen: soft, non tender





- Genitourinary





jones draining clear yellow urine 





Results





- Labs





                                 10/20/20 22:17





                                 10/20/20 22:17


                  Abnormal Lab Results - Last 24 Hours (Table)











  10/20/20 10/20/20 10/20/20 Range/Units





  22:17 22:17 22:17 


 


WBC  10.8 H    (3.8-10.6)  k/uL


 


RBC  3.61 L    (4.30-5.90)  m/uL


 


Hgb  8.6 L D    (13.0-17.5)  gm/dL


 


Hct  28.4 L    (39.0-53.0)  %


 


MCV  78.7 L    (80.0-100.0)  fL


 


MCH  23.8 L    (25.0-35.0)  pg


 


MCHC  30.2 L    (31.0-37.0)  g/dL


 


RDW  16.7 H    (11.5-15.5)  %


 


Neutrophils #  9.2 H    (1.3-7.7)  k/uL


 


Lymphocytes #  0.6 L    (1.0-4.8)  k/uL


 


APTT   18.2 L   (22.0-30.0)  sec


 


D-Dimer   0.71 H   (<0.60)  mg/L FEU


 


Sodium     (137-145)  mmol/L


 


Chloride     ()  mmol/L


 


Carbon Dioxide     (22-30)  mmol/L


 


Glucose     (74-99)  mg/dL


 


Calcium     (8.4-10.2)  mg/dL


 


Lactate Dehydrogenase     (313-618)  U/L


 


Albumin     (3.5-5.0)  g/dL


 


Urine Protein    1+ H  (Negative)  


 


Urine Blood    Small H  (Negative)  


 


Ur Leukocyte Esterase    Moderate H  (Negative)  


 


Urine RBC    40 H  (0-5)  /hpf


 


Urine WBC    8 H  (0-5)  /hpf


 


Amorphous Sediment    Rare H  (None)  /hpf


 


Urine Mucus    Rare H  (None)  /hpf














  10/20/20 Range/Units





  22:17 


 


WBC   (3.8-10.6)  k/uL


 


RBC   (4.30-5.90)  m/uL


 


Hgb   (13.0-17.5)  gm/dL


 


Hct   (39.0-53.0)  %


 


MCV   (80.0-100.0)  fL


 


MCH   (25.0-35.0)  pg


 


MCHC   (31.0-37.0)  g/dL


 


RDW   (11.5-15.5)  %


 


Neutrophils #   (1.3-7.7)  k/uL


 


Lymphocytes #   (1.0-4.8)  k/uL


 


APTT   (22.0-30.0)  sec


 


D-Dimer   (<0.60)  mg/L FEU


 


Sodium  131 L  (137-145)  mmol/L


 


Chloride  95 L  ()  mmol/L


 


Carbon Dioxide  31 H  (22-30)  mmol/L


 


Glucose  119 H  (74-99)  mg/dL


 


Calcium  10.5 H  (8.4-10.2)  mg/dL


 


Lactate Dehydrogenase  266 L  (313-618)  U/L


 


Albumin  3.3 L  (3.5-5.0)  g/dL


 


Urine Protein   (Negative)  


 


Urine Blood   (Negative)  


 


Ur Leukocyte Esterase   (Negative)  


 


Urine RBC   (0-5)  /hpf


 


Urine WBC   (0-5)  /hpf


 


Amorphous Sediment   (None)  /hpf


 


Urine Mucus   (None)  /hpf








                                 Diabetes panel











  10/20/20 Range/Units





  22:17 


 


Sodium  131 L  (137-145)  mmol/L


 


Potassium  3.6  (3.5-5.1)  mmol/L


 


Chloride  95 L  ()  mmol/L


 


Carbon Dioxide  31 H  (22-30)  mmol/L


 


BUN  11  (9-20)  mg/dL


 


Creatinine  0.80  (0.66-1.25)  mg/dL


 


Glucose  119 H  (74-99)  mg/dL


 


Calcium  10.5 H  (8.4-10.2)  mg/dL


 


AST  28  (17-59)  U/L


 


ALT  34  (4-49)  U/L


 


Alkaline Phosphatase  88  ()  U/L


 


Total Protein  6.5  (6.3-8.2)  g/dL


 


Albumin  3.3 L  (3.5-5.0)  g/dL








                                  Calcium panel











  10/20/20 Range/Units





  22:17 


 


Calcium  10.5 H  (8.4-10.2)  mg/dL


 


Albumin  3.3 L  (3.5-5.0)  g/dL








                                 Pituitary panel











  10/20/20 Range/Units





  22:17 


 


Sodium  131 L  (137-145)  mmol/L


 


Potassium  3.6  (3.5-5.1)  mmol/L


 


Chloride  95 L  ()  mmol/L


 


Carbon Dioxide  31 H  (22-30)  mmol/L


 


BUN  11  (9-20)  mg/dL


 


Creatinine  0.80  (0.66-1.25)  mg/dL


 


Glucose  119 H  (74-99)  mg/dL


 


Calcium  10.5 H  (8.4-10.2)  mg/dL








                                  Adrenal panel











  10/20/20 Range/Units





  22:17 


 


Sodium  131 L  (137-145)  mmol/L


 


Potassium  3.6  (3.5-5.1)  mmol/L


 


Chloride  95 L  ()  mmol/L


 


Carbon Dioxide  31 H  (22-30)  mmol/L


 


BUN  11  (9-20)  mg/dL


 


Creatinine  0.80  (0.66-1.25)  mg/dL


 


Glucose  119 H  (74-99)  mg/dL


 


Calcium  10.5 H  (8.4-10.2)  mg/dL


 


Total Bilirubin  1.0  (0.2-1.3)  mg/dL


 


AST  28  (17-59)  U/L


 


ALT  34  (4-49)  U/L


 


Alkaline Phosphatase  88  ()  U/L


 


Total Protein  6.5  (6.3-8.2)  g/dL


 


Albumin  3.3 L  (3.5-5.0)  g/dL














Assessment and Plan


Assessment: 





69 yo male admitted to the hospital with AMS, He underwent TURBT on 10/15, 

pathology showed muscle invasive bladder cancer. 


Plan: 





-Keep catheter in place until patient mental status improves and is more 

ambulatory, will plan on doing TOV prior to discharge  


-Will inform Dr Alex of his admission,

## 2020-10-21 NOTE — P.CONS
History of Present Illness





- Reason for Consult


Consult date: 10/21/20


History of malignancy


Requesting physician: Haley Lucas





- Chief Complaint


Altered mental status changes





- History of Present Illness





Mr Davila there is a pleasant white male, initially seen in consult at [facility] on 10/6/19 and subsequently on 11/1/19.  The patient has had

a history of recurrent basal cell carcinoma of the face and neck treated with 

local excision in the past.  The patient then developed swelling on the left 

side of the neck which is progressive in nature, leading to a biopsy on 9/17/19.

 This showed atypical squamous cells with basaloid appearance.  He had a CT of 

the chest on 9/26/19 revealing 2 masses in the right upper lobe along with 

enlargement of left supraclavicular/left lower neck nodes.  The lung mass was 

performed 6 cm.  He had a bronchoscopy on 9/26/19, with pathology read as 

squamous cell carcinoma.


  The patient was admitted in 10/19, with a syncopal episode.  CT chest was 

repeated showing no evidence of PE, but increase in size of the right lung mass.

 The question oncology was whether he had 2 separate primaries or metastatic 

disease from primary.  A more definitive biopsy from the left neck mass was 

recommended which was performed as an outpatient on 10/17/19.  The core was 

positive for basal cell carcinoma with aggressive infiltrating growth pattern.  

Due to this finding the lung pathology was again reviewed, and the diagnosis was

changed to metastatic basal cell carcinoma.  As the patient appeared to have 

recurrent basal cell carcinoma involving neck nodes, as well as the lung, 

indicating stage IV disease.


  MRI of the brain on 10/7/19 was negative for metastasis.  PET scan on 10/21/19

showed uptake in the left neck area extending up to the hyoid bone and down 

towards the medial upper scapula.  Uptake was also noted in the right lung mass.


 The patient was admitted on 10/31/19 with recurrent syncopal episodes.  These 

were becoming more frequent and were felt to be due to local pressure from 

growth of the left neck mass.  The patient was therefore started on palliative 

radiation.


  He was seen for his first office visit on 11/12/19.


  He completed RT on 11/20/19. He had an inpt admission subsequently for 

dehydration due to dysphagia. He has noted marked  improvement in his swallowing

since. 


   He then started Vismodegib on 12/14/19


   PD-1 was 0. NGS showed mutations of unknown significance in tP53 and IDH2


   CT scans in late 2/20 showed stability of known lesion. He did , however, 

have a progressive exophytic lesion on the left mid neck.   


  He was seen for progressive L neck lesion by ENT, and RT was recommended. He 

started the same on 3/25/20





 The patient completed his radiation to the superficial neck lesion on 4/14/20. 

He states that there has been a significant improvement in the size especially 

over the last 2-3 days.


   


  Unfortunately when he was last seen in the office by primary oncologist Dr. colunga, The pt is having further progression on exam, and Ct scans. He is having 

increased symptoms of pain The left upper neck lesion is increased in size with 

confluence, necrotic material sloughing off the surface and nodularity around 

the edges and the base. He is having increased pain in the left upper neck, 

partially improved with Norco .  The left lower neck mass seems to be 

overallstable.  The patient still has an open area with drainage intermittently 

which  is fairly minor. he reported more persistent  blood in the urine





- At that time, discussion with patient defined no definite standard options 

available, as he appears to be progressing on Erivedge. Std chemotherapy has not

been shown to confer definite clinical benefit in prospective trials. He was 

therefore recommended referral to [facility] again. He had decided against it in 3/20. 

He was however agreeable in sept


He was also refered to Urology re worsening hematuria


- Pain management with Norco discussed. We have not received any progress notes 

from the referring physicians at this time. We knew [facility] could not see 

patient until Mid Novemeber so a second referral on 10/1/20 was made to [facility]. 





He did see urology and underwent TURP with Dr. Alex on 10/14 - Unfortunetly 

revealing High grade urolthelial cancer





Review of Systems


All systems: negative


Constitutional: Reports as per HPI





Past Medical History


Past Medical History: Cancer, Hypertension, Pneumonia


Additional Past Medical History / Comment(s): hx skin cancer, lung CA, CA in the

lymph nodes.  radiation- last treatment April 14,2020. currently taking oral 

chemo.  constipation, anemia, growth on neck, recent hematuria


History of Any Multi-Drug Resistant Organisms: None Reported


Past Surgical History: Back Surgery, Heart Catheterization, Joint Replacement, 

Orthopedic Surgery


Additional Past Surgical History / Comment(s): PREVIOUS SKIN CA LESIONS REMOVED,

RT HIP REPLACEMENT, left ANKLE ORIF WITH METAL , lung biopsy, neck biopsy,  

heart cath > 10 yrs ago


Past Anesthesia/Blood Transfusion Reactions: Previous Problems w/ Anesthesia


Additional Past Anesthesia/Blood Transfusion Reaction / Comm: Pt woke up 

violently during lung biopsy


Past Psychological History: No Psychological Hx Reported


Smoking Status: Never smoker


Past Alcohol Use History: None Reported


Additional Past Alcohol Use History / Comment(s): Patient is a lifelong 

nonsmoker but had extensive exposure to asbestos and acid at [employer] as 

well as plastic exposure and [employer].


Past Drug Use History: None Reported





- Past Family History


  ** Mother


Family Medical History: Cancer, Deep Vein Thrombosis (DVT)


Additional Family Medical History / Comment(s): BREAST CA





Medications and Allergies


                                Home Medications











 Medication  Instructions  Recorded  Confirmed  Type


 


Metoprolol Tartrate [Lopressor] 12.5 mg PO HS PRN 11/22/19 10/20/20 History


 


Erivedge 150 mg PO DAILY 10/12/20 10/20/20 History


 


HYDROcodone/APAP 5-325MG [Norco 0.5 tab PO Q6HR PRN 10/12/20 10/20/20 History





5-325]    


 


Ondansetron [Zofran] 4 - 8 mg PO Q4HR PRN 10/12/20 10/20/20 History


 


Cyclobenzaprine [Flexeril] 10 mg PO TID #15 tab 10/19/20 10/20/20 Rx


 


hydrALAZINE HCL [Apresoline] 25 mg PO TID PRN 10/20/20 10/20/20 History








                                    Allergies











Allergy/AdvReac Type Severity Reaction Status Date / Time


 


No Known Allergies Allergy   Verified 10/20/20 22:32














Physical Exam


Vitals: 


                                   Vital Signs











  Temp Pulse Pulse Resp BP BP Pulse Ox


 


 10/21/20 16:00    93  18   


 


 10/21/20 08:00    93  18   


 


 10/21/20 07:00  99.7 F H   93    179/84  97


 


 10/21/20 01:00  98.8 F   96    171/88  98


 


 10/21/20 00:04   101 H   18  133/76   99


 


 10/20/20 21:45  98.6 F  109 H   18  125/79   99








                                Intake and Output











 10/21/20 10/21/20 10/21/20





 06:59 14:59 22:59


 


Intake Total  1564 


 


Output Total 850 850 850


 


Balance -850 714 -850


 


Intake:   


 


  Intake, IV Titration  1264 





  Amount   


 


    Dextrose 5%-0.45% NaCl 1,  664 





    000 ml @ 83 mls/hr IV .   





    Q12H3M ONE Rx#:[id number]   


 


    Sodium Chloride 0.9% 1,  600 





    000 ml @ 100 mls/hr IV .   





    Q10H ONE Rx#:[id number]   


 


  Oral  300 


 


Output:   


 


  Urine 850 850 850


 


Other:   


 


  Voiding Method Indwelling Catheter Indwelling Catheter Indwelling Catheter


 


  Weight 61.235 kg  














- Constitutional


General appearance: cooperative, no acute distress





- EENT


Eyes: EOMI, PERRLA


ENT: NA/AT, normal oropharynx





- Neck





Ulceration on left neck further extended and worsened from last evaluation


Neck: normal ROM





- Respiratory


Respiratory: bilateral: diminished (Bases and through)





- Cardiovascular


Rhythm: regular





- Gastrointestinal


General gastrointestinal: soft, tenderness





- Integumentary


Integumentary: pale





- Neurologic





non-focal





- Musculoskeletal





Right shoulder ROM limited, fullness in right should and pain to palpate


Musculoskeletal: generalized weakness, right sided weakness





- Psychiatric





confused lethargic





Results


CBC & Chem 7: 


                                 10/21/20 15:21





                                 10/20/20 22:17


Labs: 


                  Abnormal Lab Results - Last 24 Hours (Table)











  10/20/20 10/20/20 10/20/20 Range/Units





  22:17 22:17 22:17 


 


WBC  10.8 H    (3.8-10.6)  k/uL


 


RBC  3.61 L    (4.30-5.90)  m/uL


 


Hgb  8.6 L D    (13.0-17.5)  gm/dL


 


Hct  28.4 L    (39.0-53.0)  %


 


MCV  78.7 L    (80.0-100.0)  fL


 


MCH  23.8 L    (25.0-35.0)  pg


 


MCHC  30.2 L    (31.0-37.0)  g/dL


 


RDW  16.7 H    (11.5-15.5)  %


 


Neutrophils #  9.2 H    (1.3-7.7)  k/uL


 


Lymphocytes #  0.6 L    (1.0-4.8)  k/uL


 


APTT   18.2 L   (22.0-30.0)  sec


 


D-Dimer   0.71 H   (<0.60)  mg/L FEU


 


Sodium     (137-145)  mmol/L


 


Chloride     ()  mmol/L


 


Carbon Dioxide     (22-30)  mmol/L


 


Glucose     (74-99)  mg/dL


 


Calcium     (8.4-10.2)  mg/dL


 


Lactate Dehydrogenase     (313-618)  U/L


 


Albumin     (3.5-5.0)  g/dL


 


Urine Protein    1+ H  (Negative)  


 


Urine Blood    Small H  (Negative)  


 


Ur Leukocyte Esterase    Moderate H  (Negative)  


 


Urine RBC    40 H  (0-5)  /hpf


 


Urine WBC    8 H  (0-5)  /hpf


 


Amorphous Sediment    Rare H  (None)  /hpf


 


Urine Mucus    Rare H  (None)  /hpf














  10/20/20 10/21/20 Range/Units





  22:17 15:21 


 


WBC    (3.8-10.6)  k/uL


 


RBC   3.91 L  (4.30-5.90)  m/uL


 


Hgb   9.1 L  (13.0-17.5)  gm/dL


 


Hct   30.6 L  (39.0-53.0)  %


 


MCV   78.3 L  (80.0-100.0)  fL


 


MCH   23.4 L  (25.0-35.0)  pg


 


MCHC   29.9 L  (31.0-37.0)  g/dL


 


RDW   16.3 H  (11.5-15.5)  %


 


Neutrophils #   9.4 H  (1.3-7.7)  k/uL


 


Lymphocytes #   0.3 L  (1.0-4.8)  k/uL


 


APTT    (22.0-30.0)  sec


 


D-Dimer    (<0.60)  mg/L FEU


 


Sodium  131 L   (137-145)  mmol/L


 


Chloride  95 L   ()  mmol/L


 


Carbon Dioxide  31 H   (22-30)  mmol/L


 


Glucose  119 H   (74-99)  mg/dL


 


Calcium  10.5 H   (8.4-10.2)  mg/dL


 


Lactate Dehydrogenase  266 L   (313-618)  U/L


 


Albumin  3.3 L   (3.5-5.0)  g/dL


 


Urine Protein    (Negative)  


 


Urine Blood    (Negative)  


 


Ur Leukocyte Esterase    (Negative)  


 


Urine RBC    (0-5)  /hpf


 


Urine WBC    (0-5)  /hpf


 


Amorphous Sediment    (None)  /hpf


 


Urine Mucus    (None)  /hpf











CT Scan - head: report reviewed





Assessment and Plan


(1) Altered mental status


Current Visit: Yes   Status: Acute   Code(s): R41.82 - ALTERED MENTAL STATUS, 

UNSPECIFIED   SNOMED Code(s): 529132095


   





(2) Bladder neoplasm of uncertain malignant potential


Current Visit: No   Status: Acute   Code(s): D41.4 - NEOPLASM OF UNCERTAIN 

BEHAVIOR OF BLADDER   SNOMED Code(s): 41927838


   





(3) Hematuria


Current Visit: No   Status: Acute   Code(s): R31.9 - HEMATURIA, UNSPECIFIED   

SNOMED Code(s): 69061775


   





(4) SIRS (systemic inflammatory response syndrome)


Current Visit: No   Status: Acute   Code(s): R65.10 - SIRS OF NON-INFECTIOUS 

ORIGIN W/O ACUTE ORGAN DYSFUNCTION   SNOMED Code(s): 475068920


   





(5) Basal cell carcinoma


Current Visit: Yes   Status: Acute   Code(s): C44.91 - BASAL CELL CARCINOMA OF 

SKIN, UNSPECIFIED   SNOMED Code(s): 853772552


   


Plan: 





Altered Mental Status Changes:


 - Confused in the picture of multiple cancer primaries 


 - MRI Brain





Dysphagia:


 - Swallow evaluation concern for progression





Hematuria:


 - Secondary to New Bladder Cancer





Basal Cell Carcinoma - Progression after all standard of care


 - Awaiting second opion through [facility] or [facility]





New Diagnosis of High Grade Urotherlial Cancer:


 - Bs: TURP on 10/14





Right Shoulder Pain:


 - New with severe tenderness on exam


 - Imaging to further assess ordered

## 2020-10-22 LAB
ANION GAP SERPL CALC-SCNC: 8.1 MMOL/L (ref 4–12)
BUN SERPL-SCNC: 11 MG/DL (ref 9–27)
BUN/CREAT SERPL: 15.71 RATIO (ref 12–20)
CALCIUM SPEC-MCNC: 9.8 MG/DL (ref 8.7–10.3)
CHLORIDE SERPL-SCNC: 94 MMOL/L (ref 96–109)
CO2 SERPL-SCNC: 30.9 MMOL/L (ref 21.6–31.8)
ERYTHROCYTE [DISTWIDTH] IN BLOOD BY AUTOMATED COUNT: 3.32 M/UL (ref 4.3–5.9)
ERYTHROCYTE [DISTWIDTH] IN BLOOD: 16.4 % (ref 11.5–15.5)
FERRITIN SERPL-MCNC: 73.4 NG/ML (ref 22–322)
GLUCOSE SERPL-MCNC: 102 MG/DL (ref 70–110)
HCT VFR BLD AUTO: 26.1 % (ref 39–53)
HGB BLD-MCNC: 8.1 GM/DL (ref 13–17.5)
IRON SERPL-MCNC: 3 UG/DL (ref 65–175)
LDH SPEC-CCNC: 201 U/L (ref 120–246)
MCH RBC QN AUTO: 24.4 PG (ref 25–35)
MCHC RBC AUTO-ENTMCNC: 31 G/DL (ref 31–37)
MCV RBC AUTO: 78.5 FL (ref 80–100)
PLATELET # BLD AUTO: 283 K/UL (ref 150–450)
POTASSIUM SERPL-SCNC: 3.2 MMOL/L (ref 3.5–5.5)
SODIUM SERPL-SCNC: 133 MMOL/L (ref 135–145)
TIBC SERPL-MCNC: 246 UG/DL (ref 228–460)
VIT B12 SERPL-MCNC: 434 PG/ML (ref 200–944)
WBC # BLD AUTO: 9.4 K/UL (ref 3.8–10.6)

## 2020-10-22 RX ADMIN — PANTOPRAZOLE SODIUM SCH MG: 40 TABLET, DELAYED RELEASE ORAL at 09:31

## 2020-10-22 RX ADMIN — HEPARIN SODIUM SCH UNIT: 5000 INJECTION, SOLUTION INTRAVENOUS; SUBCUTANEOUS at 09:31

## 2020-10-22 RX ADMIN — CEFAZOLIN SCH MLS/HR: 330 INJECTION, POWDER, FOR SOLUTION INTRAMUSCULAR; INTRAVENOUS at 09:32

## 2020-10-22 RX ADMIN — HEPARIN SODIUM SCH UNIT: 5000 INJECTION, SOLUTION INTRAVENOUS; SUBCUTANEOUS at 19:55

## 2020-10-22 NOTE — P.PN
Subjective


Progress Note Date: 10/22/20


Principal diagnosis: 





shoulder pain ? progression   





Review of imaging with shoulder pain, mri neg for metastatic disease





Objective





- Vital Signs


Vital signs: 


                                   Vital Signs











Temp  98.2 F   10/22/20 07:02


 


Pulse  108 H  10/22/20 07:02


 


Resp  17   10/22/20 07:02


 


BP  126/71   10/22/20 07:02


 


Pulse Ox  96   10/22/20 07:02








                                 Intake & Output











 10/21/20 10/22/20 10/22/20





 18:59 06:59 18:59


 


Intake Total 1564  400


 


Output Total 1700 2150 


 


Balance -136 -2150 400


 


Intake:   


 


  IV   400


 


    Sodium Chloride 0.9% 1,   400





    000 ml @ 50 mls/hr IV .   





    Q20H TK Rx#:[id number]   


 


  Intake, IV Titration 1264  





  Amount   


 


    Dextrose 5%-0.45% NaCl 1, 664  





    000 ml @ 83 mls/hr IV .   





    Q12H3M ONE Rx#:[id number]   


 


    Sodium Chloride 0.9% 1, 600  





    000 ml @ 100 mls/hr IV .   





    Q10H ONE Rx#:[id number]   


 


  Oral 300  


 


Output:   


 


  Urine 1700 2150 


 


Other:   


 


  Voiding Method Indwelling Catheter Indwelling Catheter Indwelling Catheter














- Exam





- Constitutional


General appearance: cooperative, no acute distress





- EENT


Eyes: EOMI, PERRLA


ENT: NA/AT, normal oropharynx





- Neck





Ulceration on left neck further extended and worsened from last evaluation


Neck: normal ROM





- Respiratory


Respiratory: bilateral: diminished (Bases and through)





- Cardiovascular


Rhythm: regular





- Gastrointestinal


General gastrointestinal: soft, tenderness





- Integumentary


Integumentary: pale





- Neurologic





non-focal





- Musculoskeletal





Right shoulder ROM limited, fullness in right should and pain to palpate


Musculoskeletal: generalized weakness, right sided weakness





- Psychiatric





confused lethargic











- Labs


CBC & Chem 7: 


                                 10/22/20 06:39





                                 10/22/20 06:39


Labs: 


                  Abnormal Lab Results - Last 24 Hours (Table)











  10/21/20 10/21/20 10/22/20 Range/Units





  15:21 15:21 06:39 


 


RBC  3.91 L    (4.30-5.90)  m/uL


 


Hgb  9.1 L    (13.0-17.5)  gm/dL


 


Hct  30.6 L    (39.0-53.0)  %


 


MCV  78.3 L    (80.0-100.0)  fL


 


MCH  23.4 L    (25.0-35.0)  pg


 


MCHC  29.9 L    (31.0-37.0)  g/dL


 


RDW  16.3 H    (11.5-15.5)  %


 


Neutrophils #  9.4 H    (1.3-7.7)  k/uL


 


Lymphocytes #  0.3 L    (1.0-4.8)  k/uL


 


Sodium    133 L  (135-145)  mmol/L


 


Potassium    3.2 L  (3.5-5.5)  mmol/L


 


Chloride    94 L  ()  mmol/L


 


Uric Acid   3.5 L   (3.7-8.7)  mg/dL


 


Iron    3 L  ()  ug/dL


 


% Saturation    1.22 L  (15.00-50.00)   














  10/22/20 Range/Units





  06:39 


 


RBC  3.32 L  (4.30-5.90)  m/uL


 


Hgb  8.1 L  (13.0-17.5)  gm/dL


 


Hct  26.1 L  (39.0-53.0)  %


 


MCV  78.5 L  (80.0-100.0)  fL


 


MCH  24.4 L  (25.0-35.0)  pg


 


MCHC   (31.0-37.0)  g/dL


 


RDW  16.4 H  (11.5-15.5)  %


 


Neutrophils #   (1.3-7.7)  k/uL


 


Lymphocytes #   (1.0-4.8)  k/uL


 


Sodium   (135-145)  mmol/L


 


Potassium   (3.5-5.5)  mmol/L


 


Chloride   ()  mmol/L


 


Uric Acid   (3.7-8.7)  mg/dL


 


Iron   ()  ug/dL


 


% Saturation   (15.00-50.00)   








                      Microbiology - Last 24 Hours (Table)











 10/20/20 22:17 Blood Culture - Preliminary





 Blood    No Growth after 24 hours














Assessment and Plan


(1) Altered mental status


Current Visit: Yes   Status: Acute   Code(s): R41.82 - ALTERED MENTAL STATUS, 

UNSPECIFIED   SNOMED Code(s): 102171351


   





(2) Bladder neoplasm of uncertain malignant potential


Current Visit: No   Status: Acute   Code(s): D41.4 - NEOPLASM OF UNCERTAIN 

BEHAVIOR OF BLADDER   SNOMED Code(s): 53727249


   





(3) Hematuria


Current Visit: No   Status: Acute   Code(s): R31.9 - HEMATURIA, UNSPECIFIED   

SNOMED Code(s): 33773295


   





(4) SIRS (systemic inflammatory response syndrome)


Current Visit: No   Status: Acute   Code(s): R65.10 - SIRS OF NON-INFECTIOUS 

ORIGIN W/O ACUTE ORGAN DYSFUNCTION   SNOMED Code(s): 105882514


   





(5) Basal cell carcinoma


Current Visit: Yes   Status: Acute   Code(s): C44.91 - BASAL CELL CARCINOMA OF 

SKIN, UNSPECIFIED   SNOMED Code(s): 549308671


   


Plan: 





Altered Mental Status Changes:


 - Confused in the picture of multiple cancer primaries 


 - MRI Brain negative for metastatic disease. With increased confusion and 

possible septic joint assess and rule out infection. If underlying issue able to

be treated and no improvement in mental status will consider LP 





Dysphagia:


 - Swallow evaluation concern for progression





Hematuria:


 - Secondary to New Bladder Cancer





Basal Cell Carcinoma - Progression after all standard of care


 - Awaiting second opion through [facility] or [facility]





New Diagnosis of High Grade Urotherlial Cancer:


 - Bs: TURP on 10/14





Right Shoulder Pain:


 - New with severe tenderness on exam


 - CT wo reviewed? synovitis - Ortho on consult





Physician Attest: I have cpompleted the full history and physical and agree with

above dictation, dictated as a scribe

## 2020-10-22 NOTE — P.PN
Subjective


Progress Note Date: 10/22/20





HISTORY OF PRESENT ILLNESS


This is a 70-year-old  male patient of Dr. Mata and Dr. Bundy 

with past medical history significant for hypertension, basal cell skin cancer 

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 

palliative radiation therapy in 2019.  On 10/14/2020, patient underwent 

cystoscopy, transurethral resection of bladder tumors with Dr. Alex. Pathology

report positive for high-grade papillary urothelial carcinoma infiltrating the 

lamina propria and sample fragments of muscularis propria.


Patient is a poor historian.  He states that he was seen in the emergency center

on October 19 for pinched nerve in his right arm.  He denies any injury.  He 

states he rolled over in bed and the pain developed.  X-rays of the right 

shoulder and humerus did not show any acute abnormality.  Patient was discharged

home on Flexeril.  Patient states that he is due to see Dr. Alex today in his 

office to have Sandoval catheter removed.





Patient returned yesterday with inability to walk, no appetite unable to care 

for himself.  Patient complains of nausea with loss of appetite and difficulty 

swallowing due to left neck mass.  Chest x-ray revealed chronic linear density 

right upper lobe consistent with scarring or atelectasis.  No heart failure.  No

change.  CAT scan of the brain revealed cerebral atrophy.  No acute intracranial

abnormality.  EKG is a sinus rhythm with occasional PVCs, heart rate 101.  WBC 

10.8, hemoglobin 8.6, platelet count 349.  D-dimer 0.71.  Sodium 131, potassium 

3.6, chloride 95, CO2 31, BUN 11 creatinine 0.8.  Liver function tests were 

normal.  .  Urinalysis cloudy, blood small, leukoesterase moderate, 

nitrate negative.  RBCs 40, wbc's 8.  Influenza testing negative.  Patient was 

given 1 L of IV fluids, placed on the [facility] floor and consults requested with 

oncology, urology, orthopedics. 





10/22: Patient continues to have confusion and hallucinations.  He was evaluated

by speech therapy with no problems with swallowing but concern for vocal cord 

paralysis and recommend possible ENT workup as an outpatient if appropriate.  He

has undergone CAT scan of the right shoulder which revealed moderate right 

glenohumeral joint effusion with suspected synovitis.  If concern for septic ar

thritis, recommend joint sampling.  No acute osseous abnormality.  Orthopedics 

has recommended sling.  CRP and sed rate have been ordered and if elevated may 

consider aspiration of the joint.  Patient was seen by oncology and MRI of the 

brain was ordered.  This was a suboptimal study that revealed mild diffuse age-

related cerebral atrophy and moderate chronic small vessel ischemic changes.  No

definitive new enhancing masses to suggest metastatic disease to the brain.  

Oncology is considering LP.  Temperature max 100.3, heart rate 108, blood 

pressure 126/71, pulse ox 96% on room air. Cardiac monitor is a sinus rhythm.  

Repeat blood work reveals WBC 9.4, hemoglobin 8.1, platelet count 283.  Sodium 

133, potassium 3.2 chloride 94, CO2 30.9, BUN 11 creatinine 0.7.  Blood sugar 

102.  Vitamin B12 434.  Uric acid 3.5.  PT and OT recommend home with homecare 

subacute rehab.  Methylmalonic acid, sed rate CRP are pending.  Blood culture 

shows no growth after 24 hours.  Patient has been seen by urology with 

recommendations to keep catheter in place and patient is mentally improved and 

more ambulatory.  Plan for voiding trial prior to discharge.





REVIEW OF SYSTEMS


Constitutional: No fever, no chills, no night sweats.  Reports weight change.  

Reports weakness, Reportsfatigue Reports lethargy.  No daytime sleepiness.


EENT: No headache.  No blurred vision or double vision, no loss of vision.  

Reports dizziness.  No nasal drainage or congestion.  No epistaxis.  Reports 

dysphagia.


Lungs: No shortness of breath, cough, no sputum production.  No wheezing.


Cardiovascular: No chest pain, no lower extremity edema.  No palpitations.  No 

paroxysmal nocturnal dyspnea.  No orthopnea.  Reports lightheadedness or 

dizziness.  No syncopal episodes.


Abdominal: No abdominal pain.  No nausea, vomiting.  No diarrhea.  No 

constipation.  No bloody or tarry stools..  No loss of appetite.


Genitourinary: No dysuria, increased frequency, urgency.  No urinary retention.


Musculoskeletal: No myalgias.  Reports muscle weakness, Reports gait 

dysfunction.  No back pain.  Reports neck pain.


Integumentary: Reports wounds, no lesions.  No rash or pruritus.  No unusual 

bruising.  No change in hair or nails.


Neurologic: Reports dysphagia. No facial droop.  Worsening change in mentation. 

No head injury. No headache. No paralysis. No paresthesia.


Psychiatric: No depression.  No anxiety.  No mood swings.


Endocrine: No abnormal blood sugars.  Reports weight change.  





PHYSICAL EXAMINATION


Gen: This is a 70-year-old  male.  He is resting in bed appears to be 

comfortable.


HEENT: Head is atraumatic, normocephalic. Pupils equal, round. Sclerae is anict

nicola.  Open wounds/ulcerations the left neck region. 


NECK: Supple. No JVD. No lymphadenopathy. No thyromegaly. 


LUNGS: Clear to auscultation. No wheezes or rhonchi.  No intercostal 

retractions.


HEART: Regular rate and rhythm. No murmur. 


ABDOMEN: Soft. Bowel sounds are present. No masses.  No tenderness.  Sandoval 

catheter draining clear clementine urine.


EXTREMITIES: No pedal edema.  No calf tenderness.


NEUROLOGICAL: Patient is awake, alert and oriented x3. Cranial nerves 2 through 

12 are grossly intact. 





ASSESSMENT AND PLAN


1.  Generalized debility, weight loss, loss of appetite and weakness secondary 

to cancer.  PT, OT, speech therapy consultations.





2.  Basal cell skin cancer of the left cheek with metastatic disease to the neck

and lungs status post palliative radiation therapy, under the care of Dr. Posada.  

Consult with oncology appreciated.





3.  Bladder cancer status post recent transurethral resection of bladder tumors.

 Consult with urology appreciated.  Patient is due today for Sandoval catheter 

removal and follow-up in the office.





4.  Right shoulder pain.  Orthopedic consult.  X-rays negative for acute 

findings.





5.  Severe protein calorie malnutrition with weight loss, loss of appetite and 

dysphagia secondary to neck tumor.  Regular diet and ensure twice daily.  Speech

therapy.





6.  Anemia of chronic disease.





7.  Hyponatremia secondary to malnutrition.  Continue IV fluids 0.9 normal 

saline at 50 mL per hour.





8.  Hypertension.  Continue home medications which are hydralazine 25 mg 3 times

daily as needed and Lopressor 12.5 mg at bedtime as needed.





9.  Metabolic encephalopathy and hallucinations secondary to underlying cancers 

and medical debilitation.  





10.  GI prophylaxis.  Protonix daily.





11.  DVT prophylaxis.  Heparin subcu.





Discharge plan: Home with [facility].  PT, OT, speech therapy.





Impression and plan of care have been directed as dictated by the signing 

physician.  Haley Lucas nurse practitioner acting as scribe for signing 

physician





Objective





- Vital Signs


Vital signs: 


                                   Vital Signs











Temp  98.2 F   10/22/20 07:02


 


Pulse  108 H  10/22/20 07:02


 


Resp  17   10/22/20 07:02


 


BP  126/71   10/22/20 07:02


 


Pulse Ox  96   10/22/20 07:02








                                 Intake & Output











 10/21/20 10/22/20 10/22/20





 18:59 06:59 18:59


 


Intake Total 1564  


 


Output Total 1700 2150 


 


Balance -136 -2150 


 


Intake:   


 


  Intake, IV Titration 1264  





  Amount   


 


    Dextrose 5%-0.45% NaCl 1, 664  





    000 ml @ 83 mls/hr IV .   





    Q12H3M [facility] Rx#:[id number]   


 


    Sodium Chloride 0.9% 1, 600  





    000 ml @ 100 mls/hr IV .   





    Q10H [facility] Rx#:[id number]   


 


  Oral 300  


 


Output:   


 


  Urine 1700 2150 


 


Other:   


 


  Voiding Method Indwelling Catheter Indwelling Catheter 














- Labs


CBC & Chem 7: 


                                 10/22/20 06:39





                                 10/22/20 06:39


Labs: 


                  Abnormal Lab Results - Last 24 Hours (Table)











  10/21/20 10/21/20 10/22/20 Range/Units





  15:21 15:21 06:39 


 


RBC  3.91 L   3.32 L  (4.30-5.90)  m/uL


 


Hgb  9.1 L   8.1 L  (13.0-17.5)  gm/dL


 


Hct  30.6 L   26.1 L  (39.0-53.0)  %


 


MCV  78.3 L   78.5 L  (80.0-100.0)  fL


 


MCH  23.4 L   24.4 L  (25.0-35.0)  pg


 


MCHC  29.9 L    (31.0-37.0)  g/dL


 


RDW  16.3 H   16.4 H  (11.5-15.5)  %


 


Neutrophils #  9.4 H    (1.3-7.7)  k/uL


 


Lymphocytes #  0.3 L    (1.0-4.8)  k/uL


 


Uric Acid   3.5 L   (3.7-8.7)  mg/dL








                      Microbiology - Last 24 Hours (Table)











 10/20/20 22:17 Blood Culture - Preliminary





 Blood    No Growth after 24 hours

## 2020-10-22 NOTE — P.CNOR
History of Present Illness





- [facility]


Consult date: 10/21/20


Consult reason: joint pain


History of present illness: 





Patient is a 70-year-old male presented to recent hospital visits to [facility] for an osteoarthritic regards to right shoulder pain, the first being

on 10/19/2020 then 10/21/2020.  Patient states that he woke up with pain, starts

in the shoulder and transferred down the arm and towards the elbow.  He denies 

any recent trauma to the area.  He denies any previous surgery to the right 

shoulder upper arm.





Patient has a very extensive medical history that involves 2 different cancer 

diagnosis is.  Patient is being followed by multiple medical specialists for 

these problems.





At his 10/19 visit, they did do a x-rays of the shoulder and humerus which 

demonstrated no acute fractures or dislocations.  There were no obvious signs of

metastatic disease.  At his most recent visit, a computed tomography scan has 

been done of the shoulder, also demonstrating no fractures or dislocations or 

obvious signs of metastatic disease.





Patient was evaluated on 10/21/2020 at bedside, he is resting comfortably.  He 

notes most pain in the upper arm shoulder region and also trends down through 

the humerus to the elbow.  Motion of the right upper extremity causes 

significant discomfort in the midshaft of the humerus and shoulder region.  He 

denies any pain involving the cervical spine he denies any numbness or tingling 

of the right upper extremity.  He denies any elsa weakness, he contributes most

of that the discomfort that is reproduced with movement. Patient is having no 

symptoms in his left upper extremity.  Patient has no discomfort bilateral lower

extremities.  He has no paresthesias of the lower extremity.





Review of Systems


Constitutional: Reports as per [facility]





Past Medical History


Past Medical History: Cancer, Hypertension, Pneumonia


Additional Past Medical History / Comment(s): hx skin cancer, lung CA, CA in the

lymph nodes.  radiation- last treatment April 14,2020. currently taking oral 

chemo.  constipation, anemia, growth on neck, recent hematuria


History of Any Multi-Drug Resistant Organisms: None Reported


Past Surgical History: Back Surgery, Heart Catheterization, Joint Replacement, 

Orthopedic Surgery


Additional Past Surgical History / Comment(s): PREVIOUS SKIN CA LESIONS REMOVED,

RT HIP REPLACEMENT, left ANKLE ORIF WITH METAL , lung biopsy, neck biopsy,  

heart cath > 10 yrs ago


Past Anesthesia/Blood Transfusion Reactions: Previous Problems w/ Anesthesia


Additional Past Anesthesia/Blood Transfusion Reaction / Comm: Pt woke up 

violently during lung biopsy


Past Psychological History: No Psychological Hx Reported


Smoking Status: Never smoker


Past Alcohol Use History: None Reported


Additional Past Alcohol Use History / Comment(s): Patient is a lifelong 

nonsmoker but had extensive exposure to asbestos and acid at [employer] as 

well as plastic exposure and [employer].


Past Drug Use History: None Reported





- Past Family History


  ** Mother


Family Medical History: Cancer, Deep Vein Thrombosis (DVT)


Additional Family Medical History / Comment(s): BREAST CA





Medications and Allergies


                                Home Medications











 Medication  Instructions  Recorded  Confirmed  Type


 


Metoprolol Tartrate [Lopressor] 12.5 mg PO HS PRN 11/22/19 10/20/20 History


 


Erivedge 150 mg PO DAILY 10/12/20 10/20/20 History


 


HYDROcodone/APAP 5-325MG [Norco 0.5 tab PO Q6HR PRN 10/12/20 10/20/20 History





5-325]    


 


Ondansetron [Zofran] 4 - 8 mg PO Q4HR PRN 10/12/20 10/20/20 History


 


Cyclobenzaprine [Flexeril] 10 mg PO TID #15 tab 10/19/20 10/20/20 Rx


 


hydrALAZINE HCL [Apresoline] 25 mg PO TID PRN 10/20/20 10/20/20 History








                                    Allergies











Allergy/AdvReac Type Severity Reaction Status Date / Time


 


No Known Allergies Allergy   Verified 10/20/20 22:32














Physical Examination





Right upper extremity:





There are no open lesions or sores present, there is no significant areas of 

erythema


Notable shoulder effusion is appreciated 


Patient is very tender with palpation throughout the shoulder joint in the 

proximal and mid shaft of the humerus with palpatio.  No tenderness with 

palpation surrounding the forearm, hand and wrist 


Range of motion of the shoulder and elbow are very limited due to pain in the 

shoulder.  Wrist flexion, wrist extension, finger abduction are intact 


Strength testing was not assessed


His sensory exam to light touch throughout the extremity is intact radial and 

ulnar pulses are 2+








 





Results





- Labs


Labs: 


                  Abnormal Lab Results - Last 24 Hours (Table)











  10/21/20 10/21/20 10/22/20 Range/Units





  15:21 15:21 06:39 


 


RBC  3.91 L   3.32 L  (4.30-5.90)  m/uL


 


Hgb  9.1 L   8.1 L  (13.0-17.5)  gm/dL


 


Hct  30.6 L   26.1 L  (39.0-53.0)  %


 


MCV  78.3 L   78.5 L  (80.0-100.0)  fL


 


MCH  23.4 L   24.4 L  (25.0-35.0)  pg


 


MCHC  29.9 L    (31.0-37.0)  g/dL


 


RDW  16.3 H   16.4 H  (11.5-15.5)  %


 


Neutrophils #  9.4 H    (1.3-7.7)  k/uL


 


Lymphocytes #  0.3 L    (1.0-4.8)  k/uL


 


Uric Acid   3.5 L   (3.7-8.7)  mg/dL








                      Microbiology - Last 24 Hours (Table)











 10/20/20 22:17 Blood Culture - Preliminary





 Blood    No Growth after 24 hours








                                      H & H











  10/20/20 10/21/20 10/22/20 Range/Units





  22:17 15:21 06:39 


 


Hgb  8.6 L D  9.1 L  8.1 L  (13.0-17.5)  gm/dL


 


Hct  28.4 L  30.6 L  26.1 L  (39.0-53.0)  %








                                   Coagulation











  10/20/20 Range/Units





  22:17 


 


INR  1.0  (<1.2)  











Result Diagrams: 


                                 10/22/20 06:39





                                 10/22/20 06:39





- Diagnostic results


Shoulder x-ray: report reviewed, image reviewed (X-rays were reviewed from 

10/19/2020.  Obvious pus or arthritic changes noted throughout the shoulder and 

elbow.  No acute fractures or dislocations.  No obvious signs of metastatic 

disease)


Shoulder CT: report reviewed, image reviewed (Images and report demonstrate 

obvious arthritic findings throughout the glenohumeral joint.  they did note so

me changes along with fluid in the shoulder joint.  No obvious signs of 

metastatic disease.  )





Assessment and Plan


Assessment: 





Right shoulder pain





Right shoulder osteoarthritis





Right shoulder glenohumeral synovitis





Right shoulder effusion


Plan: 





I was able to discuss the case, including with physical exam findings and 

imaging studies my attending Dr. Kaur.  Patient was prescribed a sling for 

comfort.  We recommend use of that at all times at this point.  CRP and sed rate

 were also ordered.





Depending on laboratory results, may consider aspiration of the right shoulder 

joint for further evaluation





Pain control, appreciated primary medical service recommendations





Other medical special recommendations





Further recommendations to follow


Time with Patient: Less than 30

## 2020-10-22 NOTE — MR
EXAMINATION TYPE: MR brain wo/w con

 

DATE OF EXAM: 10/22/2020

 

COMPARISON: MRI dated 10/7/2019.

 

HISTORY: Metastatic disease.

 

TECHNIQUE: 

Multiplanar, multisequence images of the brain and brainstem is performed without and with IV contras
t, utilizing 6 mL intravenous Gadavist .

 

FINDINGS: Exam noted suboptimal as there is artifact degradation related to patient motion. Diffusion
 weighted images demonstrate no evidence of a recent infarct or other diffusion abnormality.  There i
s no worrisome extra-axial fluid collection. Mild ventricular and sulcal prominence. Scattered foci o
f T2 hyperintensity seen throughout the white matter bilaterally. Approximate 40-50 small scattered l
esions redemonstrated. The reference 7 mm lesion left frontal parietal junction axial image 25 stable
.

 

Midline structures demonstrate normal morphology.  The craniocervical junction appears within normal 
limits.  Post contrast images demonstrate no obvious new enhancing mass or suspicious enhancement. Th
e dural venous sinuses appear patent. The visualized sinuses are clear and the globes are intact. Int
erval improvement in inferior left mastoid fluid

 

IMPRESSION: Suboptimal study. Mild diffuse age-related cerebral atrophy and moderate chronic small ve
ssel ischemic change. No definitive new enhancing masses to suggest metastatic disease to the brain.

## 2020-10-23 LAB
ALBUMIN SERPL-MCNC: 2.4 G/DL (ref 3.5–5)
ALBUMIN/GLOB SERPL: 0.8 {RATIO}
ALP SERPL-CCNC: 170 U/L (ref 38–126)
ALT SERPL-CCNC: 165 U/L (ref 4–49)
ANION GAP SERPL CALC-SCNC: 5 MMOL/L
AST SERPL-CCNC: 230 U/L (ref 17–59)
BASOPHILS # BLD AUTO: 0 K/UL (ref 0–0.2)
BASOPHILS NFR BLD AUTO: 0 %
BUN SERPL-SCNC: 14 MG/DL (ref 9–20)
CALCIUM SPEC-MCNC: 8.9 MG/DL (ref 8.4–10.2)
CELL CNT PNL FLD: 100
CHLORIDE SERPL-SCNC: 95 MMOL/L (ref 98–107)
CO2 SERPL-SCNC: 29 MMOL/L (ref 22–30)
DEPRECATED POLYS # FLD: 96 %
EOSINOPHIL # BLD AUTO: 0 K/UL (ref 0–0.7)
EOSINOPHIL NFR BLD AUTO: 0 %
ERYTHROCYTE [DISTWIDTH] IN BLOOD BY AUTOMATED COUNT: 3.04 M/UL (ref 4.3–5.9)
ERYTHROCYTE [DISTWIDTH] IN BLOOD: 16.2 % (ref 11.5–15.5)
GLOBULIN SER CALC-MCNC: 2.9 G/DL
GLUCOSE SERPL-MCNC: 111 MG/DL (ref 74–99)
HCT VFR BLD AUTO: 23.9 % (ref 39–53)
HGB BLD-MCNC: 7.2 GM/DL (ref 13–17.5)
LYMPHOCYTES # SPEC AUTO: 0.4 K/UL (ref 1–4.8)
LYMPHOCYTES NFR SPEC AUTO: 8 %
MAGNESIUM SPEC-SCNC: 1.8 MG/DL (ref 1.6–2.3)
MCH RBC QN AUTO: 23.5 PG (ref 25–35)
MCHC RBC AUTO-ENTMCNC: 29.9 G/DL (ref 31–37)
MCV RBC AUTO: 78.7 FL (ref 80–100)
MONOCYTES # BLD AUTO: 0.3 K/UL (ref 0–1)
MONOCYTES NFR BLD AUTO: 6 %
MONONUC CELLS # FLD: 4 %
NEUTROPHILS # BLD AUTO: 3.8 K/UL (ref 1.3–7.7)
NEUTROPHILS NFR BLD AUTO: 84 %
NUC CELL # FLD: (no result) /UL
PLATELET # BLD AUTO: 206 K/UL (ref 150–450)
POTASSIUM SERPL-SCNC: 3.1 MMOL/L (ref 3.5–5.1)
PROT SERPL-MCNC: 5.3 G/DL (ref 6.3–8.2)
SODIUM SERPL-SCNC: 129 MMOL/L (ref 137–145)
WBC # BLD AUTO: 4.5 K/UL (ref 3.8–10.6)

## 2020-10-23 PROCEDURE — 0R9J3ZX DRAINAGE OF RIGHT SHOULDER JOINT, PERCUTANEOUS APPROACH, DIAGNOSTIC: ICD-10-PCS

## 2020-10-23 RX ADMIN — PANTOPRAZOLE SODIUM SCH MG: 40 TABLET, DELAYED RELEASE ORAL at 07:25

## 2020-10-23 RX ADMIN — HEPARIN SODIUM SCH UNIT: 5000 INJECTION, SOLUTION INTRAVENOUS; SUBCUTANEOUS at 20:08

## 2020-10-23 RX ADMIN — CEFAZOLIN SCH MLS/HR: 330 INJECTION, POWDER, FOR SOLUTION INTRAMUSCULAR; INTRAVENOUS at 05:13

## 2020-10-23 RX ADMIN — HYDROCODONE BITARTRATE AND ACETAMINOPHEN PRN EACH: 5; 325 TABLET ORAL at 05:16

## 2020-10-23 RX ADMIN — ACETAMINOPHEN PRN MG: 325 TABLET, FILM COATED ORAL at 15:30

## 2020-10-23 RX ADMIN — HEPARIN SODIUM SCH UNIT: 5000 INJECTION, SOLUTION INTRAVENOUS; SUBCUTANEOUS at 07:25

## 2020-10-23 NOTE — P.PN
Subjective


Progress Note Date: 10/23/20


Principal diagnosis: 





shoulder pain ? progression   





Status post effusion from shoulder





Objective





- Vital Signs


Vital signs: 


                                   Vital Signs











Temp  98.3 F   10/23/20 07:00


 


Pulse  105 H  10/23/20 07:00


 


Resp  16   10/23/20 07:00


 


BP  99/61   10/23/20 07:00


 


Pulse Ox  97   10/23/20 07:00








                                 Intake & Output











 10/22/20 10/23/20 10/23/20





 18:59 06:59 18:59


 


Intake Total 400 50 600


 


Output Total 600 750 


 


Balance -200 -700 600


 


Intake:   


 


    400


 


    Sodium Chloride 0.9% 1, 400  400





    000 ml @ 50 mls/hr IV .   





    Q20H [facility] Rx#:[id number]   


 


  Oral  50 200


 


Output:   


 


  Urine 600 750 


 


Other:   


 


  Voiding Method Indwelling Catheter Indwelling Catheter Indwelling Catheter














- Exam





- Constitutional


General appearance: cooperative, no acute distress





- EENT


Eyes: EOMI, PERRLA


ENT: NA/AT, normal oropharynx





- Neck





Ulceration on left neck further extended and worsened from last evaluation


Neck: normal ROM





- Respiratory


Respiratory: bilateral: diminished (Bases and through)





- Cardiovascular


Rhythm: regular





- Gastrointestinal


General gastrointestinal: soft, tenderness





- Integumentary


Integumentary: pale





- Neurologic





non-focal





- Musculoskeletal





Right shoulder ROM limited, fullness in right should and pain to palpate


Musculoskeletal: generalized weakness, right sided weakness





- Psychiatric





confused lethargic











- Labs


CBC & Chem 7: 


                                 10/23/20 14:53





                                 10/23/20 14:53


Labs: 


                  Abnormal Lab Results - Last 24 Hours (Table)











  10/22/20 10/22/20 Range/Units





  06:39 06:39 


 


ESR  106 H   (0-20)  mm/Hr


 


C-Reactive Protein   22.1 H  (0.0-0.8)  mg/dL








                      Microbiology - Last 24 Hours (Table)











 10/20/20 22:17 Blood Culture - Preliminary





 Blood    No Growth after 48 hours














Assessment and Plan


(1) Altered mental status


Current Visit: Yes   Status: Acute   Code(s): R41.82 - ALTERED MENTAL STATUS, 

UNSPECIFIED   SNOMED Code(s): 334010870


   





(2) Bladder neoplasm of uncertain malignant potential


Current Visit: No   Status: Acute   Code(s): D41.4 - NEOPLASM OF UNCERTAIN 

BEHAVIOR OF BLADDER   SNOMED Code(s): 46679775


   





(3) Hematuria


Current Visit: No   Status: Acute   Code(s): R31.9 - HEMATURIA, UNSPECIFIED   

SNOMED Code(s): 74042565


   





(4) SIRS (systemic inflammatory response syndrome)


Current Visit: No   Status: Acute   Code(s): R65.10 - SIRS OF NON-INFECTIOUS 

ORIGIN W/O ACUTE ORGAN DYSFUNCTION   SNOMED Code(s): 654153673


   





(5) Basal cell carcinoma


Current Visit: Yes   Status: Acute   Code(s): C44.91 - BASAL CELL CARCINOMA OF 

SKIN, UNSPECIFIED   SNOMED Code(s): 172299222


   


Plan: 





Altered Mental Status Changes:


 - Confused in the picture of multiple cancer primaries 


 - MRI Brain negative for metastatic disease. With increased confusion and 

possible septic joint assess and rule out infection. If underlying issue able to

be treated and no improvement in mental status will consider LP 





Dysphagia:


 - Swallow evaluation concern for progression





Hematuria:


 - Secondary to New Bladder Cancer





Basal Cell Carcinoma - Progression after all standard of care


 - Awaiting second opinion through [facility] or [facility]





New Diagnosis of High Grade Urotherlial Cancer:


 - Bs: TURP on 10/14





Right Shoulder Pain:


 - New with severe tenderness on exam


 - CT wo reviewed? synovitis - Ortho has evaluated and status post effusion of 

joint


 - Await results of sample drawn today 10/23





Hypokalemia: 


 - Recheck CMP and Mag today





Microcytic Anemia:


 - Recheck CBC today





Increased LFTs Today:


 - Repeat CMP in am


 - Abdominal Ultrasound


 - Check Coags

## 2020-10-23 NOTE — P.PCN
Date of Procedure: 10/23/20


Preoperative Diagnosis: 


right glenohumeral joint synovitis/effusion


Postoperative Diagnosis: 


same


Procedure(s) Performed: 


aspiration right glenohumeral joint


Anesthesia: local


Surgeon: Jonas Kaur


Estimated Blood Loss (ml): 0


Pathology: other (fluid for cell count/Gram stain/culture/crystals)


Condition: stable


Disposition: no change


Indications for Procedure: 


the patient's a 70-year-old male with multiple medical comorbidities who 

presents with progressive right shoulder swelling and pain.  A  discussion the 

risks and benefits of aspiration was made with patient and his wife.  He opted 

to proceed.


Operative Findings: 


as below


Description of Procedure: 


the right anterior shoulder was prepped with Betadine and alcohol.  1 mL of 1% 

plain lidocaine was injected into the soft tissues.  6 mL of serous joint fluid 

was aspirated from the glenohumeral joint.  This was sent for analysis to 

include cell count/Gram stain/cultures/crystals.  He tolerated the procedure 

well.  There was no complications.  There was no blood loss.

## 2020-10-23 NOTE — P.PN
Subjective


Progress Note Date: 10/23/20





HISTORY OF PRESENT ILLNESS


This is a 70-year-old  male patient of Dr. Mata and Dr. Bundy 

with past medical history significant for hypertension, basal cell skin cancer 

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 

palliative radiation therapy in 2019.  On 10/14/2020, patient underwent 

cystoscopy, transurethral resection of bladder tumors with Dr. Alex. Pathology

report positive for high-grade papillary urothelial carcinoma infiltrating the 

lamina propria and sample fragments of muscularis propria.


Patient is a poor historian.  He states that he was seen in the emergency center

on October 19 for pinched nerve in his right arm.  He denies any injury.  He 

states he rolled over in bed and the pain developed.  X-rays of the right 

shoulder and humerus did not show any acute abnormality.  Patient was discharged

home on Flexeril.  Patient states that he is due to see Dr. Alex today in his 

office to have Sandoval catheter removed.





Patient returned yesterday with inability to walk, no appetite unable to care 

for himself.  Patient complains of nausea with loss of appetite and difficulty 

swallowing due to left neck mass.  Chest x-ray revealed chronic linear density 

right upper lobe consistent with scarring or atelectasis.  No heart failure.  No

change.  CAT scan of the brain revealed cerebral atrophy.  No acute intracranial

abnormality.  EKG is a sinus rhythm with occasional PVCs, heart rate 101.  WBC 

10.8, hemoglobin 8.6, platelet count 349.  D-dimer 0.71.  Sodium 131, potassium 

3.6, chloride 95, CO2 31, BUN 11 creatinine 0.8.  Liver function tests were 

normal.  .  Urinalysis cloudy, blood small, leukoesterase moderate, 

nitrate negative.  RBCs 40, wbc's 8.  Influenza testing negative.  Patient was 

given 1 L of IV fluids, placed on the [facility] floor and consults requested with 

oncology, urology, orthopedics. 





10/22: Patient continues to have confusion and hallucinations.  He was evaluated

by speech therapy with no problems with swallowing but concern for vocal cord 

paralysis and recommend possible ENT workup as an outpatient if appropriate.  He

has undergone CAT scan of the right shoulder which revealed moderate right 

glenohumeral joint effusion with suspected synovitis.  If concern for septic ar

thritis, recommend joint sampling.  No acute osseous abnormality.  Orthopedics 

has recommended sling.  CRP and sed rate have been ordered and if elevated may 

consider aspiration of the joint.  Patient was seen by oncology and MRI of the 

brain was ordered.  This was a suboptimal study that revealed mild diffuse age-

related cerebral atrophy and moderate chronic small vessel ischemic changes.  No

definitive new enhancing masses to suggest metastatic disease to the brain.  

Oncology is considering LP.  Temperature max 100.3, heart rate 108, blood 

pressure 126/71, pulse ox 96% on room air. Cardiac monitor is a sinus rhythm.  

Repeat blood work reveals WBC 9.4, hemoglobin 8.1, platelet count 283.  Sodium 

133, potassium 3.2 chloride 94, CO2 30.9, BUN 11 creatinine 0.7.  Blood sugar 

102.  Vitamin B12 434.  Uric acid 3.5.  PT and OT recommend home with homecare 

subacute rehab.  Methylmalonic acid, sed rate CRP are pending.  Blood culture 

shows no growth after 24 hours.  Patient has been seen by urology with 

recommendations to keep catheter in place and patient is mentally improved and 

more ambulatory.  Plan for voiding trial prior to discharge.





10/23: Patient denies any new complaints.  Mental status is improved.  Contacted

patient's wife and discussed current condition, prognosis and recommendations 

for hospice.  She initially agreed to hospice but would like to wait and do 

palliative care at the time of discharge.  Temperature max 100.3 in the past 24 

hours.  Heart rate 105, blood pressure 99/61, pulse ox 97% on room air.  Patient

underwent aspiration of the right glenohumeral joint by orthopedics.  6 ML's of 

serous fluid was aspirated and sent for analysis.  Oncology is following.





REVIEW OF SYSTEMS


Constitutional: No fever, no chills, no night sweats.  Reports weight change.  

Reports weakness, Reportsfatigue Reports lethargy.  No daytime sleepiness.


EENT: No headache.  No blurred vision or double vision, no loss of vision.  

Reports dizziness.  No nasal drainage or congestion.  No epistaxis.  Reports 

dysphagia.


Lungs: No shortness of breath, cough, no sputum production.  No wheezing.


Cardiovascular: No chest pain, no lower extremity edema.  No palpitations.  No 

paroxysmal nocturnal dyspnea.  No orthopnea.  Reports lightheadedness or 

dizziness.  No syncopal episodes.


Abdominal: No abdominal pain.  No nausea, vomiting.  No diarrhea.  No const

ipation.  No bloody or tarry stools..  No loss of appetite.


Genitourinary: No dysuria, increased frequency, urgency.  No urinary retention.


Musculoskeletal: No myalgias.  Reports muscle weakness, Reports gait 

dysfunction.  No back pain.  Reports neck pain.


Integumentary: Reports wounds, no lesions.  No rash or pruritus.  No unusual 

bruising.  No change in hair or nails.


Neurologic: Reports dysphagia. No facial droop.  Reports change in mentation 

improved. No head injury. No headache. No paralysis. No paresthesia.


Psychiatric: No depression.  No anxiety.  No mood swings.


Endocrine: No abnormal blood sugars.  Reports weight change.  





PHYSICAL EXAMINATION


Gen: This is a 70-year-old  male.  He is resting in bed appears to be 

comfortable.


HEENT: Head is atraumatic, normocephalic. Pupils equal, round. Sclerae is 

anicteric.  Open wounds/ulcerations the left neck region. 


NECK: Supple. No JVD. No lymphadenopathy. No thyromegaly. 


LUNGS: Clear to auscultation. No wheezes or rhonchi.  No intercostal 

retractions.


HEART: Regular rate and rhythm. No murmur. 


ABDOMEN: Soft. Bowel sounds are present. No masses.  No tenderness.  Sandoval 

catheter draining clear clementine urine.


EXTREMITIES: No pedal edema.  No calf tenderness.


NEUROLOGICAL: Patient is awake, alert and oriented to person and place. Cranial 

nerves 2 through 12 are grossly intact. 





ASSESSMENT AND PLAN


1.  Generalized debility, weight loss, loss of appetite and weakness secondary 

to cancer.  PT, OT, speech therapy consultations appreciated.





2.  Basal cell skin cancer of the left cheek with metastatic disease to the neck

and lungs status post palliative radiation therapy, under the care of Dr. Posada.  

Consult with oncology appreciated.  Await further recommendations.





3.  Bladder cancer status post recent transurethral resection of bladder tumors.

 Consult with urology appreciated.  Patient to maintain Sandoval catheter.





4.  Right shoulder pain.  Orthopedic consult.  Status post aspiration.





5.  Severe protein calorie malnutrition with weight loss, loss of appetite and 

dysphagia secondary to neck tumor.  Regular diet and ensure twice daily.  Speech

therapy.





6.  Anemia of chronic disease.





7.  Hyponatremia secondary to malnutrition.  Continue IV fluids 0.9 normal 

saline at 50 mL per hour.





8.  Hypertension.  Continue home medications which are hydralazine 25 mg 3 times

daily as needed and Lopressor 12.5 mg at bedtime as needed.





9.  Metabolic encephalopathy and hallucinations secondary to underlying cancers 

and medical debilitation.  





10.  GI prophylaxis.  Protonix daily.





11.  DVT prophylaxis.  Heparin subcu.





12.  Possible vocal cord paralysis.  ENT follow-up outpatient.





Discharge plan: Home with [facility].  PT, OT, speech therapy.





Impression and plan of care have been directed as dictated by the signing 

physician.  Haley Lucas nurse practitioner acting as scribe for signing 

physician





Objective





- Vital Signs


Vital signs: 


                                   Vital Signs











Temp  98.3 F   10/23/20 07:00


 


Pulse  105 H  10/23/20 07:00


 


Resp  16   10/23/20 07:00


 


BP  99/61   10/23/20 07:00


 


Pulse Ox  97   10/23/20 07:00








                                 Intake & Output











 10/22/20 10/23/20 10/23/20





 18:59 06:59 18:59


 


Intake Total 400 50 


 


Output Total 600 750 


 


Balance -200 -700 


 


Intake:   


 


    


 


    Sodium Chloride 0.9% 1, 400  





    000 ml @ 50 mls/hr IV .   





    Q20H [facility] Rx#:[id number]   


 


  Oral  50 


 


Output:   


 


  Urine 600 750 


 


Other:   


 


  Voiding Method Indwelling Catheter Indwelling Catheter 














- Labs


CBC & Chem 7: 


                                 10/22/20 06:39





                                 10/22/20 06:39


Labs: 


                  Abnormal Lab Results - Last 24 Hours (Table)











  10/22/20 10/22/20 10/22/20 Range/Units





  06:39 06:39 06:39 


 


ESR   106 H   (0-20)  mm/Hr


 


Sodium  133 L    (135-145)  mmol/L


 


Potassium  3.2 L    (3.5-5.5)  mmol/L


 


Chloride  94 L    ()  mmol/L


 


Iron  3 L    ()  ug/dL


 


% Saturation  1.22 L    (15.00-50.00)   


 


C-Reactive Protein    22.1 H  (0.0-0.8)  mg/dL








                      Microbiology - Last 24 Hours (Table)











 10/20/20 22:17 Blood Culture - Preliminary





 Blood    No Growth after 48 hours

## 2020-10-24 LAB
ALBUMIN SERPL-MCNC: 2.7 G/DL (ref 3.8–4.9)
ALBUMIN/GLOB SERPL: 1.23 G/DL (ref 1.6–3.17)
ALP SERPL-CCNC: 182 U/L (ref 41–126)
ALT SERPL-CCNC: 173 U/L (ref 10–49)
ANION GAP SERPL CALC-SCNC: 7.3 MMOL/L (ref 4–12)
APTT BLD: 23.7 SEC (ref 22–30)
AST SERPL-CCNC: 177 U/L (ref 14–35)
BASOPHILS # BLD AUTO: 0 K/UL (ref 0–0.2)
BASOPHILS NFR BLD AUTO: 0 %
BUN SERPL-SCNC: 12 MG/DL (ref 9–27)
BUN/CREAT SERPL: 20 RATIO (ref 12–20)
CALCIUM SPEC-MCNC: 8.6 MG/DL (ref 8.7–10.3)
CHLORIDE SERPL-SCNC: 97 MMOL/L (ref 96–109)
CO2 SERPL-SCNC: 29.7 MMOL/L (ref 21.6–31.8)
EOSINOPHIL # BLD AUTO: 0 K/UL (ref 0–0.7)
EOSINOPHIL NFR BLD AUTO: 0 %
ERYTHROCYTE [DISTWIDTH] IN BLOOD BY AUTOMATED COUNT: 3.01 M/UL (ref 4.3–5.9)
ERYTHROCYTE [DISTWIDTH] IN BLOOD: 16.3 % (ref 11.5–15.5)
GLOBULIN SER CALC-MCNC: 2.2 G/DL (ref 1.6–3.3)
GLUCOSE SERPL-MCNC: 112 MG/DL (ref 70–110)
HCT VFR BLD AUTO: 23.7 % (ref 39–53)
HGB BLD-MCNC: 7.1 GM/DL (ref 13–17.5)
INR PPP: 0.9 (ref ?–1.2)
LYMPHOCYTES # SPEC AUTO: 0.5 K/UL (ref 1–4.8)
LYMPHOCYTES NFR SPEC AUTO: 10 %
MCH RBC QN AUTO: 23.5 PG (ref 25–35)
MCHC RBC AUTO-ENTMCNC: 29.8 G/DL (ref 31–37)
MCV RBC AUTO: 78.9 FL (ref 80–100)
MONOCYTES # BLD AUTO: 0.3 K/UL (ref 0–1)
MONOCYTES NFR BLD AUTO: 6 %
NEUTROPHILS # BLD AUTO: 3.9 K/UL (ref 1.3–7.7)
NEUTROPHILS NFR BLD AUTO: 81 %
PLATELET # BLD AUTO: 215 K/UL (ref 150–450)
POTASSIUM SERPL-SCNC: 3.1 MMOL/L (ref 3.5–5.5)
PROT SERPL-MCNC: 4.9 G/DL (ref 6.2–8.2)
PT BLD: 9.8 SEC (ref 9–12)
SODIUM SERPL-SCNC: 134 MMOL/L (ref 135–145)
WBC # BLD AUTO: 4.8 K/UL (ref 3.8–10.6)

## 2020-10-24 PROCEDURE — 30233N1 TRANSFUSION OF NONAUTOLOGOUS RED BLOOD CELLS INTO PERIPHERAL VEIN, PERCUTANEOUS APPROACH: ICD-10-PCS

## 2020-10-24 RX ADMIN — HEPARIN SODIUM SCH UNIT: 5000 INJECTION, SOLUTION INTRAVENOUS; SUBCUTANEOUS at 07:11

## 2020-10-24 RX ADMIN — POTASSIUM CHLORIDE SCH MEQ: 20 TABLET, EXTENDED RELEASE ORAL at 10:11

## 2020-10-24 RX ADMIN — HEPARIN SODIUM SCH UNIT: 5000 INJECTION, SOLUTION INTRAVENOUS; SUBCUTANEOUS at 20:22

## 2020-10-24 RX ADMIN — PANTOPRAZOLE SODIUM SCH MG: 40 TABLET, DELAYED RELEASE ORAL at 07:11

## 2020-10-24 RX ADMIN — DOCUSATE SODIUM AND SENNOSIDES SCH EACH: 50; 8.6 TABLET ORAL at 10:26

## 2020-10-24 RX ADMIN — POTASSIUM CHLORIDE SCH MEQ: 20 TABLET, EXTENDED RELEASE ORAL at 11:39

## 2020-10-24 RX ADMIN — ACETAMINOPHEN PRN MG: 325 TABLET, FILM COATED ORAL at 20:22

## 2020-10-24 RX ADMIN — ASPIRIN SCH MG: 325 TABLET ORAL at 21:44

## 2020-10-24 RX ADMIN — CEFEPIME HYDROCHLORIDE SCH MLS/HR: 1 INJECTION, POWDER, FOR SOLUTION INTRAMUSCULAR; INTRAVENOUS at 20:21

## 2020-10-24 RX ADMIN — CEFAZOLIN SCH MLS/HR: 330 INJECTION, POWDER, FOR SOLUTION INTRAMUSCULAR; INTRAVENOUS at 20:22

## 2020-10-24 RX ADMIN — ACETAMINOPHEN PRN MG: 325 TABLET, FILM COATED ORAL at 12:33

## 2020-10-24 RX ADMIN — CEFAZOLIN SCH: 330 INJECTION, POWDER, FOR SOLUTION INTRAMUSCULAR; INTRAVENOUS at 04:02

## 2020-10-24 RX ADMIN — HYDROCODONE BITARTRATE AND ACETAMINOPHEN PRN EACH: 5; 325 TABLET ORAL at 07:11

## 2020-10-24 NOTE — US
EXAMINATION TYPE: US abdomen comp/pelvis limited

 

DATE OF EXAM: 10/24/2020

 

COMPARISON: US kidneys 9/25/2020 2020, CT 2019

 

CLINICAL HISTORY: Increased LFTs. Increased LFT's, hx of CA, patient states he recently had tumors re
moved from his bladder, exam done portable.

 

EXAM MEASUREMENTS:

 

Liver Length:  17.2 cm   

Gallbladder Wall:  0.3 cm   

CBD:  0.3 cm

Spleen:  11.7 cm   

Right Kidney:  10.7 x 3.9 x 5.1 cm 

Left Kidney:  10.7 x 5.7 x 4.7 cm  

 

 

 

Pancreas:  obscured by overlying midline bowel gas

Liver:  scanned intercostally, no focal abnormality seen.

Gallbladder:  Gallbladder wall adenomyomatosis. No cholelithiasis, gallbladder wall thickening, or pe
richolecystic edema.

CBD:  visualized portions wnl, limited by overlying bowel gas 

Spleen:  visualized portions wnl, limited by overlying bowel gas   

Right Kidney:  wnl   

Left Kidney:  visualized portions wnl, inferior pole limited by overlying bowel gas   

Upper IVC:  wnl  

Abd Aorta:  mid and distal portions appear wnl, proximal portion obscured by overlying midline bowel 
gas

Bladder: Incompletely distended with jones catheter,. Incomplete distention of the urinary bladder li
saúl accentuates wall thickness measuring up to 0.4cm. 

**Bilateral Jets Seen no

 

**Prostate: mildly enlarged measuring 4.0 x 3.2 x 5.7cm 

 

IMPRESSION: 

1. No focal lesion of the visualized liver. No biliary ductal dilatation.

2. Suboptimal exam with obscuration of many structures due to overlying bowel gas.

## 2020-10-24 NOTE — P.PN
Subjective


Progress Note Date: 10/24/20





HISTORY OF PRESENT ILLNESS


This is a 70-year-old  male patient of Dr. Mata and Dr. Bundy 

with past medical history significant for hypertension, basal cell skin cancer 

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 

palliative radiation therapy in 2019.  On 10/14/2020, patient underwent 

cystoscopy, transurethral resection of bladder tumors with Dr. Alex. Pathology

report positive for high-grade papillary urothelial carcinoma infiltrating the 

lamina propria and sample fragments of muscularis propria.


Patient is a poor historian.  He states that he was seen in the emergency center

on October 19 for pinched nerve in his right arm.  He denies any injury.  He 

states he rolled over in bed and the pain developed.  X-rays of the right 

shoulder and humerus did not show any acute abnormality.  Patient was discharged

home on Flexeril.  Patient states that he is due to see Dr. Alex today in his 

office to have Sandoval catheter removed.





Patient returned yesterday with inability to walk, no appetite unable to care 

for himself.  Patient complains of nausea with loss of appetite and difficulty 

swallowing due to left neck mass.  Chest x-ray revealed chronic linear density 

right upper lobe consistent with scarring or atelectasis.  No heart failure.  No

change.  CAT scan of the brain revealed cerebral atrophy.  No acute intracranial

abnormality.  EKG is a sinus rhythm with occasional PVCs, heart rate 101.  WBC 

10.8, hemoglobin 8.6, platelet count 349.  D-dimer 0.71.  Sodium 131, potassium 

3.6, chloride 95, CO2 31, BUN 11 creatinine 0.8.  Liver function tests were 

normal.  .  Urinalysis cloudy, blood small, leukoesterase moderate, 

nitrate negative.  RBCs 40, wbc's 8.  Influenza testing negative.  Patient was 

given 1 L of IV fluids, placed on the [facility] floor and consults requested with 

oncology, urology, orthopedics. 





10/22: Patient continues to have confusion and hallucinations.  He was evaluated

by speech therapy with no problems with swallowing but concern for vocal cord 

paralysis and recommend possible ENT workup as an outpatient if appropriate.  He

has undergone CAT scan of the right shoulder which revealed moderate right 

glenohumeral joint effusion with suspected synovitis.  If concern for septic ar

thritis, recommend joint sampling.  No acute osseous abnormality.  Orthopedics 

has recommended sling.  CRP and sed rate have been ordered and if elevated may 

consider aspiration of the joint.  Patient was seen by oncology and MRI of the 

brain was ordered.  This was a suboptimal study that revealed mild diffuse age-

related cerebral atrophy and moderate chronic small vessel ischemic changes.  No

definitive new enhancing masses to suggest metastatic disease to the brain.  

Oncology is considering LP.  Temperature max 100.3, heart rate 108, blood 

pressure 126/71, pulse ox 96% on room air. Cardiac monitor is a sinus rhythm.  

Repeat blood work reveals WBC 9.4, hemoglobin 8.1, platelet count 283.  Sodium 

133, potassium 3.2 chloride 94, CO2 30.9, BUN 11 creatinine 0.7.  Blood sugar 

102.  Vitamin B12 434.  Uric acid 3.5.  PT and OT recommend home with homecare 

subacute rehab.  Methylmalonic acid, sed rate CRP are pending.  Blood culture 

shows no growth after 24 hours.  Patient has been seen by urology with 

recommendations to keep catheter in place and patient is mentally improved and 

more ambulatory.  Plan for voiding trial prior to discharge.





10/23: Patient denies any new complaints.  Mental status is improved.  Contacted

patient's wife and discussed current condition, prognosis and recommendations 

for hospice.  She initially agreed to hospice but would like to wait and do 

palliative care at the time of discharge.  Temperature max 100.3 in the past 24 

hours.  Heart rate 105, blood pressure 99/61, pulse ox 97% on room air.  Patient

underwent aspiration of the right glenohumeral joint by orthopedics.  6 ML's of 

serous fluid was aspirated and sent for analysis.  Oncology is following.





10/24: Patient is awake and alert today.  His mental status is most improved 

since admission.  Urology is planning to remove Sandoval catheter today and try 

voiding trial.  Patient continues to complain of right shoulder pain with no 

sign of infection on fluid study.  His hemoglobin today is at 7.1 gradually 

dropping since admission. He will be transfused 1 unit of packed RBCs, stool for

fecal occult blood will be added.  Wound culture to be obtained from the left 

neck wound and cefepime added.  Patient states that he had a large bowel 

movement yesterday.  Other lab work revealed potassium of 3.1 which will be 

replaced.  Total bilirubin 1.5, , , alkaline phosphatase 182.





REVIEW OF SYSTEMS


Constitutional: No fever, no chills, no night sweats.  Reports weight change.  

Reports weakness, Reportsfatigue Reports lethargy.  No daytime sleepiness.


EENT: No headache.  No blurred vision or double vision, no loss of vision.  

Reports dizziness.  No nasal drainage or congestion.  No epistaxis.  Reports 

dysphagia.


Lungs: No shortness of breath, cough, no sputum production.  No wheezing.


Cardiovascular: No chest pain, no lower extremity edema.  No palpitations.  No 

paroxysmal nocturnal dyspnea.  No orthopnea.  Reports lightheadedness or 

dizziness.  No syncopal episodes.


Abdominal: No abdominal pain.  No nausea, vomiting.  No diarrhea.  No 

constipation.  No bloody or tarry stools..  No loss of appetite.


Genitourinary: No dysuria, increased frequency, urgency.  No urinary retention.


Musculoskeletal: No myalgias.  Reports muscle weakness, Reports gait 

dysfunction.  No back pain.  Reports neck pain.


Integumentary: Reports wounds, no lesions.  No rash or pruritus.  No unusual 

bruising.  No change in hair or nails.


Neurologic: Reports dysphagia. No facial droop.  Reports change in mentation 

resolved. No head injury. No headache. No paralysis. No paresthesia.


Psychiatric: No depression.  No anxiety.  No mood swings.


Endocrine: No abnormal blood sugars.  Reports weight change.  





PHYSICAL EXAMINATION


Gen: This is a 70-year-old  male.  He is resting in bed appears to be 

comfortable.


HEENT: Head is atraumatic, normocephalic. Pupils equal, round. Sclerae is 

anicteric.  Open wounds/ulcerations the left neck region. 


NECK: Supple. No JVD. No lymphadenopathy. No thyromegaly. 


LUNGS: Clear to auscultation. No wheezes or rhonchi.  No intercostal 

retractions.


HEART: Regular rate and rhythm. No murmur. 


ABDOMEN: Soft. Bowel sounds are present. No masses.  No tenderness.  Sandoval 

catheter draining clear clementine urine.


EXTREMITIES: No pedal edema.  No calf tenderness.


NEUROLOGICAL: Patient is awake, alert and oriented 3. Cranial nerves 2 through 

12 are grossly intact. 





ASSESSMENT AND PLAN


1.  Generalized debility, weight loss, loss of appetite and weakness secondary 

to cancer.  PT, OT, speech therapy consultations appreciated.





2.  Basal cell skin cancer of the left cheek with metastatic disease to the neck

and lungs status post palliative radiation therapy, under the care of Dr. Posada.  

Consult with oncology appreciated.  Await further recommendations.  Wound 

culture and cefepime added.





3.  Bladder cancer status post recent transurethral resection of bladder tumors.

 Consult with urology appreciated.  Sandoval catheter to be removed today and start

voiding trial.





4.  Right shoulder pain.  Orthopedic consult.  Status post aspiration.





5.  Severe protein calorie malnutrition with weight loss, loss of appetite and 

dysphagia secondary to neck tumor.  Regular diet and ensure twice daily.  Speech

therapy appreciated.





6.  Anemia of chronic disease with possible acute component.  Patient will be 

transfused 1 unit of packed RBCs, stool for fecal occult blood ordered.





7.  Hyponatremia secondary to malnutrition.  Continue IV fluids 0.9 normal 

saline at 50 mL per hour.





8.  Hypertension.  Continue home medications which are hydralazine 25 mg 3 times

daily as needed and Lopressor 12.5 mg at bedtime as needed.





9.  Metabolic encephalopathy and hallucinations secondary to underlying cancers 

and medical debilitation, resolved.  





10.  GI prophylaxis.  Protonix daily.





11.  DVT prophylaxis.  Heparin subcu.





12.  Possible vocal cord paralysis.  ENT follow-up outpatient.





Discharge plan: Home with [facility].  





Impression and plan of care have been directed as dictated by the signing 

physician.  Haley Lucas nurse practitioner acting as scribe for signing 

physician





Objective





- Vital Signs


Vital signs: 


                                   Vital Signs











Temp  99.8 F H  10/24/20 07:00


 


Pulse  89   10/24/20 07:00


 


Resp  18   10/24/20 07:00


 


BP  145/76   10/24/20 07:00


 


Pulse Ox  99   10/24/20 07:00








                                 Intake & Output











 10/23/20 10/24/20 10/24/20





 18:59 06:59 18:59


 


Intake Total 800 200 


 


Output Total  1600 


 


Balance 800 -1400 


 


Intake:   


 


   100 


 


    Sodium Chloride 0.9% 1, 400 100 





    000 ml @ 50 mls/hr IV .   





    Q20H [facility] Rx#:[id number]   


 


  Oral 400 100 


 


Output:   


 


  Urine  1600 


 


Other:   


 


  Voiding Method Indwelling Catheter  Indwelling Catheter


 


  # Bowel Movements  1 














- Labs


CBC & Chem 7: 


                                 10/24/20 05:29





                                 10/24/20 05:29


Labs: 


                  Abnormal Lab Results - Last 24 Hours (Table)











  10/23/20 10/23/20 10/24/20 Range/Units





  14:53 14:53 05:29 


 


RBC  3.04 L   3.01 L  (4.30-5.90)  m/uL


 


Hgb  7.2 L   7.1 L  (13.0-17.5)  gm/dL


 


Hct  23.9 L   23.7 L  (39.0-53.0)  %


 


MCV  78.7 L   78.9 L  (80.0-100.0)  fL


 


MCH  23.5 L   23.5 L  (25.0-35.0)  pg


 


MCHC  29.9 L   29.8 L  (31.0-37.0)  g/dL


 


RDW  16.2 H   16.3 H  (11.5-15.5)  %


 


Lymphocytes #  0.4 L   0.5 L  (1.0-4.8)  k/uL


 


Sodium   129 L   (137-145)  mmol/L


 


Potassium   3.1 L   (3.5-5.1)  mmol/L


 


Chloride   95 L   ()  mmol/L


 


Glucose   111 H   (74-99)  mg/dL


 


Calcium     (8.7-10.3)  mg/dL


 


Total Bilirubin   1.4 H   (0.2-1.3)  mg/dL


 


AST   230 H   (17-59)  U/L


 


ALT   165 H   (4-49)  U/L


 


Alkaline Phosphatase   170 H   ()  U/L


 


Total Protein   5.3 L   (6.3-8.2)  g/dL


 


Albumin   2.4 L   (3.5-5.0)  g/dL


 


Albumin/Globulin Ratio     (1.60-3.17)  g/dL














  10/24/20 Range/Units





  05:29 


 


RBC   (4.30-5.90)  m/uL


 


Hgb   (13.0-17.5)  gm/dL


 


Hct   (39.0-53.0)  %


 


MCV   (80.0-100.0)  fL


 


MCH   (25.0-35.0)  pg


 


MCHC   (31.0-37.0)  g/dL


 


RDW   (11.5-15.5)  %


 


Lymphocytes #   (1.0-4.8)  k/uL


 


Sodium  134 L  (137-145)  mmol/L


 


Potassium  3.1 L  (3.5-5.1)  mmol/L


 


Chloride   ()  mmol/L


 


Glucose  112 H  (74-99)  mg/dL


 


Calcium  8.6 L  (8.7-10.3)  mg/dL


 


Total Bilirubin  1.5 H  (0.2-1.3)  mg/dL


 


AST  177 H  (17-59)  U/L


 


ALT  173 H  (4-49)  U/L


 


Alkaline Phosphatase  182 H  ()  U/L


 


Total Protein  4.9 L  (6.3-8.2)  g/dL


 


Albumin  2.70 L  (3.5-5.0)  g/dL


 


Albumin/Globulin Ratio  1.23 L  (1.60-3.17)  g/dL








                      Microbiology - Last 24 Hours (Table)











 10/23/20 12:15 Gram Stain - Preliminary





 Shoulder - Right Wound Culture - Preliminary


 


 10/20/20 22:17 Blood Culture - Preliminary





 Blood    No Growth after 72 hours


 


 10/23/20 12:15 Anaerobic Culture - Preliminary





 Shoulder - Right 


 


 10/23/20 12:15 Fungal Culture - Preliminary





 Shoulder - Right

## 2020-10-24 NOTE — P.PN
Subjective


Progress Note Date: 10/24/20





No acute overnigth event, mental status improved. Sandoval draining clear yellow 

urine  





Objective





- Vital Signs


Vital signs: 


                                   Vital Signs











Temp  99.3 F   10/24/20 14:07


 


Pulse  83   10/24/20 14:07


 


Resp  18   10/24/20 12:59


 


BP  132/77   10/24/20 14:07


 


Pulse Ox  97   10/24/20 14:07








                                 Intake & Output











 10/23/20 10/24/20 10/24/20





 18:59 06:59 18:59


 


Intake Total 800 200 0


 


Output Total  1600 725


 


Balance 800 -1400 -725


 


Intake:   


 


   100 


 


    Sodium Chloride 0.9% 1, 400 100 





    000 ml @ 50 mls/hr IV .   





    Q20H [facility] Rx#:[id number]   


 


  Oral 400 100 


 


  Blood Product   0


 


    Rc As-1  Unit   0





    [id number]   


 


Output:   


 


  Urine  1600 525


 


    Uretheral (Sandoval)   525


 


  Post Void Residual   200


 


Other:   


 


  Voiding Method Indwelling Catheter  Indwelling Catheter


 


  # Voids   1


 


  # Bowel Movements  1 














- Constitutional


General appearance: Present: no acute distress





- Gastrointestinal


General gastrointestinal: Present: soft.  Absent: distended





- Psychiatric


Psychiatric: Present: A&O x's 3





- Labs


CBC & Chem 7: 


                                 10/24/20 05:29





                                 10/24/20 05:29


Labs: 


                  Abnormal Lab Results - Last 24 Hours (Table)











  10/23/20 10/23/20 10/24/20 Range/Units





  14:53 14:53 05:29 


 


RBC  3.04 L   3.01 L  (4.30-5.90)  m/uL


 


Hgb  7.2 L   7.1 L  (13.0-17.5)  gm/dL


 


Hct  23.9 L   23.7 L  (39.0-53.0)  %


 


MCV  78.7 L   78.9 L  (80.0-100.0)  fL


 


MCH  23.5 L   23.5 L  (25.0-35.0)  pg


 


MCHC  29.9 L   29.8 L  (31.0-37.0)  g/dL


 


RDW  16.2 H   16.3 H  (11.5-15.5)  %


 


Lymphocytes #  0.4 L   0.5 L  (1.0-4.8)  k/uL


 


Sodium   129 L   (137-145)  mmol/L


 


Potassium   3.1 L   (3.5-5.1)  mmol/L


 


Chloride   95 L   ()  mmol/L


 


Glucose   111 H   (74-99)  mg/dL


 


Calcium     (8.7-10.3)  mg/dL


 


Total Bilirubin   1.4 H   (0.2-1.3)  mg/dL


 


AST   230 H   (17-59)  U/L


 


ALT   165 H   (4-49)  U/L


 


Alkaline Phosphatase   170 H   ()  U/L


 


Total Protein   5.3 L   (6.3-8.2)  g/dL


 


Albumin   2.4 L   (3.5-5.0)  g/dL


 


Albumin/Globulin Ratio     (1.60-3.17)  g/dL


 


Crossmatch     














  10/24/20 10/24/20 Range/Units





  05:29 10:19 


 


RBC    (4.30-5.90)  m/uL


 


Hgb    (13.0-17.5)  gm/dL


 


Hct    (39.0-53.0)  %


 


MCV    (80.0-100.0)  fL


 


MCH    (25.0-35.0)  pg


 


MCHC    (31.0-37.0)  g/dL


 


RDW    (11.5-15.5)  %


 


Lymphocytes #    (1.0-4.8)  k/uL


 


Sodium  134 L   (137-145)  mmol/L


 


Potassium  3.1 L   (3.5-5.1)  mmol/L


 


Chloride    ()  mmol/L


 


Glucose  112 H   (74-99)  mg/dL


 


Calcium  8.6 L   (8.7-10.3)  mg/dL


 


Total Bilirubin  1.5 H   (0.2-1.3)  mg/dL


 


AST  177 H   (17-59)  U/L


 


ALT  173 H   (4-49)  U/L


 


Alkaline Phosphatase  182 H   ()  U/L


 


Total Protein  4.9 L   (6.3-8.2)  g/dL


 


Albumin  2.70 L   (3.5-5.0)  g/dL


 


Albumin/Globulin Ratio  1.23 L   (1.60-3.17)  g/dL


 


Crossmatch   See Detail  








                      Microbiology - Last 24 Hours (Table)











 10/23/20 12:15 Gram Stain - Preliminary





 Shoulder - Right Wound Culture - Preliminary


 


 10/20/20 22:17 Blood Culture - Preliminary





 Blood    No Growth after 72 hours


 


 10/23/20 12:15 Anaerobic Culture - Preliminary





 Shoulder - Right 


 


 10/23/20 12:15 Fungal Culture - Preliminary





 Shoulder - Right 














Assessment and Plan


Assessment: 





71 yo male admitted to the hospital with AMS, He underwent TURBT on 10/15, 

pathology showed muscle invasive bladder cancer. 


Plan: 





-TOV today 


-Can f/u in 1 weeks with Dr Alex to discuss his pathology

## 2020-10-25 LAB
ALBUMIN SERPL-MCNC: 2.7 G/DL (ref 3.8–4.9)
ALBUMIN/GLOB SERPL: 1.17 G/DL (ref 1.6–3.17)
ALP SERPL-CCNC: 184 U/L (ref 41–126)
ALT SERPL-CCNC: 122 U/L (ref 10–49)
ANION GAP SERPL CALC-SCNC: 5.5 MMOL/L (ref 4–12)
AST SERPL-CCNC: 78 U/L (ref 14–35)
BUN SERPL-SCNC: 8 MG/DL (ref 9–27)
BUN/CREAT SERPL: 13.33 RATIO (ref 12–20)
CALCIUM SPEC-MCNC: 8.7 MG/DL (ref 8.7–10.3)
CHLORIDE SERPL-SCNC: 98 MMOL/L (ref 96–109)
CO2 SERPL-SCNC: 32.5 MMOL/L (ref 21.6–31.8)
ERYTHROCYTE [DISTWIDTH] IN BLOOD BY AUTOMATED COUNT: 3.38 M/UL (ref 4.3–5.9)
ERYTHROCYTE [DISTWIDTH] IN BLOOD: 16.7 % (ref 11.5–15.5)
GLOBULIN SER CALC-MCNC: 2.3 G/DL (ref 1.6–3.3)
GLUCOSE SERPL-MCNC: 108 MG/DL (ref 70–110)
HCT VFR BLD AUTO: 27 % (ref 39–53)
HGB BLD-MCNC: 8.2 GM/DL (ref 13–17.5)
MCH RBC QN AUTO: 24.3 PG (ref 25–35)
MCHC RBC AUTO-ENTMCNC: 30.4 G/DL (ref 31–37)
MCV RBC AUTO: 79.9 FL (ref 80–100)
PLATELET # BLD AUTO: 228 K/UL (ref 150–450)
POTASSIUM SERPL-SCNC: 3.3 MMOL/L (ref 3.5–5.5)
PROT SERPL-MCNC: 5 G/DL (ref 6.2–8.2)
SODIUM SERPL-SCNC: 136 MMOL/L (ref 135–145)
WBC # BLD AUTO: 7.1 K/UL (ref 3.8–10.6)

## 2020-10-25 PROCEDURE — 0RBJ4ZX EXCISION OF RIGHT SHOULDER JOINT, PERCUTANEOUS ENDOSCOPIC APPROACH, DIAGNOSTIC: ICD-10-PCS

## 2020-10-25 PROCEDURE — 3E1U48X IRRIGATION OF JOINTS USING IRRIGATING SUBSTANCE, PERCUTANEOUS ENDOSCOPIC APPROACH, DIAGNOSTIC: ICD-10-PCS

## 2020-10-25 RX ADMIN — CEFAZOLIN SCH: 330 INJECTION, POWDER, FOR SOLUTION INTRAMUSCULAR; INTRAVENOUS at 16:09

## 2020-10-25 RX ADMIN — CEFEPIME HYDROCHLORIDE SCH MLS/HR: 1 INJECTION, POWDER, FOR SOLUTION INTRAMUSCULAR; INTRAVENOUS at 07:52

## 2020-10-25 RX ADMIN — HYDROCODONE BITARTRATE AND ACETAMINOPHEN PRN EACH: 5; 325 TABLET ORAL at 10:45

## 2020-10-25 RX ADMIN — HEPARIN SODIUM SCH UNIT: 5000 INJECTION, SOLUTION INTRAVENOUS; SUBCUTANEOUS at 07:52

## 2020-10-25 RX ADMIN — ASPIRIN SCH MG: 325 TABLET ORAL at 19:57

## 2020-10-25 RX ADMIN — PANTOPRAZOLE SODIUM SCH MG: 40 TABLET, DELAYED RELEASE ORAL at 07:51

## 2020-10-25 RX ADMIN — CEFEPIME HYDROCHLORIDE SCH MLS/HR: 1 INJECTION, POWDER, FOR SOLUTION INTRAMUSCULAR; INTRAVENOUS at 19:57

## 2020-10-25 RX ADMIN — HEPARIN SODIUM SCH UNIT: 5000 INJECTION, SOLUTION INTRAVENOUS; SUBCUTANEOUS at 19:57

## 2020-10-25 RX ADMIN — DOCUSATE SODIUM AND SENNOSIDES SCH EACH: 50; 8.6 TABLET ORAL at 07:52

## 2020-10-25 NOTE — P.OP
Date of Procedure: 10/25/20


Preoperative Diagnosis: 


Right septic glenohumeral joint arthritis


Postoperative Diagnosis: 


Same


Procedure(s) Performed: 


Right shoulder arthroscopic lavage/synovectomy


Anesthesia: JUSTINE


Surgeon: Jonas Kaur


Estimated Blood Loss (ml): 10


Pathology: other (Gram stain/cultures)


Condition: stable


Disposition: PACU


Indications for Procedure: 


The patient is a 70-year-old male with multiple medical comorbidities presents 

with a several day history of right shoulder pain.  His ESR and CRP were quite 

elevated.  Initial aspiration did not show significant organisms on Gram stain. 

Subsequent cultures showed group G strep.  A discussion of the risks and 

benefits of operative intervention was made with patient.  He opted to proceed 

with surgery.  Operative risks to include persistence of infection need for 

subsequent procedures was discussed.  Informed consent was obtained.


Operative Findings: 


As below


Description of Procedure: 


The patient was brought to the operating room, and after induction of general 

anesthesia was placed in the beachchair position.  The bony prominences were 

appropriately padded.  The right upper extremity was prepped and draped in 

normal fashion.  The bony outlines the acromion, distal clavicle, and coracoid 

process were outlined with a skin marker.  A spinal needle was then inserted 

posteriorly.  The fluid was sent for Gram stain and culture.  The glenohumeral 

joint was inflated with 50 mL of saline utilizing a spinal needle from posterior

approach.  A posterior portal was made 1 cm medial and inferior to the posterior

lateral border of the acromion.  A blunt trocar was used to easily into the 

joint.  Diagnostic arthroscopy was performed.  There was marked synovitis.  An 

anterior portals made just lateral to the coracoid process entering the joint 

above the subscapularis tendon.  This was debrided with a motorized shaver.  

This involved the rotator interval, the anterior portion the joint, the inferior

recess, and posteriorly.  The rotator cuff appeared to be intact.  The biceps an

chor was intact.  The subscapularis was intact.  There was some chondrolysis 

involving the central inferior portion of the glenoid.  Minimal degenerative 

changes were noted involving humeral head.  9 L of fluid was utilized.  The 

arthroscope was then removed.  The posterior portal was closed with Steri-

Strips.  The left anterior portal was left open for drainage.  A sterile 

dressing was applied in addition to a sling.  The patient was awoken from 

general anesthesia and transferred to the recovery room in good condition.  

Blood loss was estimated at 10 mL.  No complications were incurred.  Sponge and 

needle counts were correct at the end of  the case.

## 2020-10-25 NOTE — P.PN
Subjective


Progress Note Date: 10/25/20


Principal diagnosis: 





Right shoulder pain





Patient notes slight improvement in terms of his right shoulder pain.





Objective





- Vital Signs


Vital signs: 


                                   Vital Signs











Temp  97.8 F   10/25/20 07:00


 


Pulse  75   10/25/20 07:00


 


Resp  18   10/25/20 07:00


 


BP  129/75   10/25/20 07:00


 


Pulse Ox  99   10/25/20 07:00








                                 Intake & Output











 10/24/20 10/25/20 10/25/20





 18:59 06:59 18:59


 


Intake Total 310  


 


Output Total 1575 1200 


 


Balance -1265 -1200 


 


Intake:   


 


  Blood Product 310  


 


    Rc As-1  Unit 310  





    [id number]   


 


Output:   


 


  Urine 1375 1200 


 


    Uretheral (Sandoval) 525  


 


  Post Void Residual 200  


 


Other:   


 


  Voiding Method Urinal Urinal Urinal


 


  # Voids 1  


 


  # Bowel Movements 1  














- Exam





Moderate anterior swelling right shoulder/no warmth or erythema


Limited active range of motion right shoulder secondary to pain


Distal neurovascular exam intact right upper extremity





- Integumentary


Integumentary: Present: normal





- Labs


CBC & Chem 7: 


                                 10/25/20 05:30





                                 10/25/20 05:30


Labs: 


                  Abnormal Lab Results - Last 24 Hours (Table)











  10/24/20 10/25/20 10/25/20 Range/Units





  10:19 05:30 05:30 


 


RBC   3.38 L   (4.30-5.90)  m/uL


 


Hgb   8.2 L   (13.0-17.5)  gm/dL


 


Hct   27.0 L   (39.0-53.0)  %


 


MCV   79.9 L   (80.0-100.0)  fL


 


MCH   24.3 L   (25.0-35.0)  pg


 


MCHC   30.4 L   (31.0-37.0)  g/dL


 


RDW   16.7 H   (11.5-15.5)  %


 


Potassium    3.3 L  (3.5-5.5)  mmol/L


 


Carbon Dioxide    32.5 H  (21.6-31.8)  mmol/L


 


BUN    8.0 L  (9.0-27.0)  mg/dL


 


Total Bilirubin    3.4 H  (0.3-1.2)  mg/dL


 


AST    78 H  (14-35)  U/L


 


ALT    122 H  (10-49)  U/L


 


Alkaline Phosphatase    184 H  ()  U/L


 


Total Protein    5.0 L  (6.2-8.2)  g/dL


 


Albumin    2.70 L  (3.80-4.90)  g/dL


 


Albumin/Globulin Ratio    1.17 L  (1.60-3.17)  g/dL


 


Crossmatch  See Detail    








                      Microbiology - Last 24 Hours (Table)











 10/24/20 10:08 Gram Stain - Preliminary





 Neck Wound Culture - Preliminary





    Beta Hemolytic Strep Group G





    Gram Neg Bacilli


 


 10/20/20 22:17 Blood Culture - Preliminary





 Blood    No Growth after 96 hours


 


 10/23/20 12:15 Gram Stain - Preliminary





 Shoulder - Right Wound Culture - Preliminary





    Beta Hemolytic Strep Group G














Assessment and Plan


Assessment: 





Right shoulder synovitis/septic arthritis


Multiple medical comorbidities


Plan: 





I talked with the patient at length regarding his condition along with options. 

I would recommend proceeding with arthroscopic irrigation and debridement of his

right shoulder today if medically stable.  I would also recommend consult 

infectious disease for appropriate antibiotic utilization.


Time with Patient: Less than 30

## 2020-10-25 NOTE — P.PN
Subjective


Progress Note Date: 10/25/20





HISTORY OF PRESENT ILLNESS


This is a 70-year-old  male patient of Dr. Mata and Dr. Bundy 

with past medical history significant for hypertension, basal cell skin cancer 

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 

palliative radiation therapy in 2019.  On 10/14/2020, patient underwent 

cystoscopy, transurethral resection of bladder tumors with Dr. Alex. Pathology

report positive for high-grade papillary urothelial carcinoma infiltrating the 

lamina propria and sample fragments of muscularis propria.


Patient is a poor historian.  He states that he was seen in the emergency center

on October 19 for pinched nerve in his right arm.  He denies any injury.  He 

states he rolled over in bed and the pain developed.  X-rays of the right 

shoulder and humerus did not show any acute abnormality.  Patient was discharged

home on Flexeril.  Patient states that he is due to see Dr. Alex today in his 

office to have Sandoval catheter removed.





Patient returned yesterday with inability to walk, no appetite unable to care 

for himself.  Patient complains of nausea with loss of appetite and difficulty 

swallowing due to left neck mass.  Chest x-ray revealed chronic linear density 

right upper lobe consistent with scarring or atelectasis.  No heart failure.  No

change.  CAT scan of the brain revealed cerebral atrophy.  No acute intracranial

abnormality.  EKG is a sinus rhythm with occasional PVCs, heart rate 101.  WBC 

10.8, hemoglobin 8.6, platelet count 349.  D-dimer 0.71.  Sodium 131, potassium 

3.6, chloride 95, CO2 31, BUN 11 creatinine 0.8.  Liver function tests were 

normal.  .  Urinalysis cloudy, blood small, leukoesterase moderate, 

nitrate negative.  RBCs 40, wbc's 8.  Influenza testing negative.  Patient was 

given 1 L of IV fluids, placed on the [facility] floor and consults requested with 

oncology, urology, orthopedics. 





10/22: Patient continues to have confusion and hallucinations.  He was evaluated

by speech therapy with no problems with swallowing but concern for vocal cord 

paralysis and recommend possible ENT workup as an outpatient if appropriate.  He

has undergone CAT scan of the right shoulder which revealed moderate right 

glenohumeral joint effusion with suspected synovitis.  If concern for septic ar

thritis, recommend joint sampling.  No acute osseous abnormality.  Orthopedics 

has recommended sling.  CRP and sed rate have been ordered and if elevated may 

consider aspiration of the joint.  Patient was seen by oncology and MRI of the 

brain was ordered.  This was a suboptimal study that revealed mild diffuse age-

related cerebral atrophy and moderate chronic small vessel ischemic changes.  No

definitive new enhancing masses to suggest metastatic disease to the brain.  

Oncology is considering LP.  Temperature max 100.3, heart rate 108, blood 

pressure 126/71, pulse ox 96% on room air. Cardiac monitor is a sinus rhythm.  

Repeat blood work reveals WBC 9.4, hemoglobin 8.1, platelet count 283.  Sodium 

133, potassium 3.2 chloride 94, CO2 30.9, BUN 11 creatinine 0.7.  Blood sugar 

102.  Vitamin B12 434.  Uric acid 3.5.  PT and OT recommend home with homecare 

subacute rehab.  Methylmalonic acid, sed rate CRP are pending.  Blood culture 

shows no growth after 24 hours.  Patient has been seen by urology with 

recommendations to keep catheter in place and patient is mentally improved and 

more ambulatory.  Plan for voiding trial prior to discharge.





10/23: Patient denies any new complaints.  Mental status is improved.  Contacted

patient's wife and discussed current condition, prognosis and recommendations 

for hospice.  She initially agreed to hospice but would like to wait and do 

palliative care at the time of discharge.  Temperature max 100.3 in the past 24 

hours.  Heart rate 105, blood pressure 99/61, pulse ox 97% on room air.  Patient

underwent aspiration of the right glenohumeral joint by orthopedics.  6 ML's of 

serous fluid was aspirated and sent for analysis.  Oncology is following.





10/24: Patient is awake and alert today.  His mental status is most improved 

since admission.  Urology is planning to remove Sandoval catheter today and try 

voiding trial.  Patient continues to complain of right shoulder pain with no 

sign of infection on fluid study.  His hemoglobin today is at 7.1 gradually 

dropping since admission. He will be transfused 1 unit of packed RBCs, stool for

fecal occult blood will be added.  Wound culture to be obtained from the left 

neck wound and cefepime added.  Patient states that he had a large bowel 

movement yesterday.  Other lab work revealed potassium of 3.1 which will be 

replaced.  Total bilirubin 1.5, , , alkaline phosphatase 182.





10/25: Dr. Kaur is planning to take patient or this afternoon as right shoulder

aspiration came back with Streptococcus.  Patient is currently covered with 

cefepime.  Wound culture from the left neck wound is also strep.  Repeat blood 

work reveals Brenda BC 7.1, hemoglobin 8.2 following 1 unit packed RBCs.  

Platelet count 228.  Sodium 136, potassium 3.3 and will be replaced, chloride 

98, CO2 32.5, BUN 18 creatinine 0.6.  C-reactive protein is 223.7.  Sandoval 

catheter was removed yesterday and patient has been voiding.  He has had minimal

residuals running 150-200.  Patient does not have Sandoval catheter resumed and 

lasts more than 250 per urology.  Patient is to follow-up in the office in one 

week





REVIEW OF SYSTEMS


Constitutional: No fever, no chills, no night sweats.  Reports weight change.  

Reports weakness, Reportsfatigue Reports lethargy.  No daytime sleepiness.


EENT: No headache.  No blurred vision or double vision, no loss of vision.  

Reports dizziness.  No nasal drainage or congestion.  No epistaxis.  Reports 

dysphagia.


Lungs: No shortness of breath, cough, no sputum production.  No wheezing.


Cardiovascular: No chest pain, no lower extremity edema.  No palpitations.  No 

paroxysmal nocturnal dyspnea.  No orthopnea.  Reports lightheadedness or 

dizziness.  No syncopal episodes.


Abdominal: No abdominal pain.  No nausea, vomiting.  No diarrhea.  No constipa

tion.  No bloody or tarry stools..  No loss of appetite.


Genitourinary: No dysuria, increased frequency, urgency.  No urinary retention.


Musculoskeletal: No myalgias.  Reports muscle weakness, Reports gait 

dysfunction.  No back pain.  Reports neck pain.


Integumentary: Reports wounds, no lesions.  No rash or pruritus.  No unusual 

bruising.  No change in hair or nails.


Neurologic: Reports dysphagia. No facial droop.  Reports change in mentation 

resolved. No head injury. No headache. No paralysis. No paresthesia.


Psychiatric: No depression.  No anxiety.  No mood swings.


Endocrine: No abnormal blood sugars.  Reports weight change.  





PHYSICAL EXAMINATION


Gen: This is a 70-year-old  male.  He is resting in bed appears to be 

comfortable.


HEENT: Head is atraumatic, normocephalic. Pupils equal, round. Sclerae is 

anicteric.  Open wounds/ulcerations the left neck region. 


NECK: Supple. No JVD. No lymphadenopathy. No thyromegaly. 


LUNGS: Clear to auscultation. No wheezes or rhonchi.  No intercostal 

retractions.


HEART: Regular rate and rhythm. No murmur. 


ABDOMEN: Soft. Bowel sounds are present. No masses.  No tenderness.  Sandoval 

catheter draining clear clementine urine.


EXTREMITIES: No pedal edema.  No calf tenderness.


NEUROLOGICAL: Patient is awake, alert and oriented 3. Cranial nerves 2 through 

12 are grossly intact. 





ASSESSMENT AND PLAN


1.  Generalized debility, weight loss, loss of appetite and weakness secondary 

to cancer and septic right shoulder joint.  PT, OT, speech therapy consultations

appreciated.  I&D of the right shoulder today.  Patient has been cleared for 

surgical intervention





2.  Basal cell skin cancer of the left cheek with metastatic disease to the neck

and lungs status post palliative radiation therapy, under the care of Dr. Posada.  

Consult with oncology appreciated.  Await further recommendations.  Wound 

culture and cefepime added.





3.  Bladder cancer status post recent transurethral resection of bladder tumors.

 Consult with urology appreciated.  Sandoval catheter to be removed today and start

voiding trial.





4.  Right shoulder pain.  Orthopedic consult.  Status post aspiration.





5.  Severe protein calorie malnutrition with weight loss, loss of appetite and 

dysphagia secondary to neck tumor.  Regular diet and ensure twice daily.  Speech

therapy appreciated.





6.  Anemia of chronic disease with possible acute component.  Patient will be 

transfused 1 unit of packed RBCs, stool for fecal occult blood ordered.





7.  Hyponatremia secondary to malnutrition.  Continue IV fluids 0.9 normal 

saline at 50 mL per hour.





8.  Hypertension.  Continue home medications which are hydralazine 25 mg 3 times

daily as needed and Lopressor 12.5 mg at bedtime as needed.





9.  Metabolic encephalopathy and hallucinations secondary to underlying cancers 

and medical debilitation, resolved.  





10.  GI prophylaxis.  Protonix daily.





11.  DVT prophylaxis.  Heparin subcu.





12.  Possible vocal cord paralysis.  ENT follow-up outpatient.





Discharge plan: Home with [facility].  





Impression and plan of care have been directed as dictated by the signing 

physician.  Haley Lucas nurse practitioner acting as scribe for signing 

physician





Objective





- Vital Signs


Vital signs: 


                                   Vital Signs











Temp  97.8 F   10/25/20 07:00


 


Pulse  75   10/25/20 07:00


 


Resp  18   10/25/20 07:00


 


BP  129/75   10/25/20 07:00


 


Pulse Ox  99   10/25/20 07:00








                                 Intake & Output











 10/24/20 10/25/20 10/25/20





 18:59 06:59 18:59


 


Intake Total 310  


 


Output Total 1575 1200 


 


Balance -1265 -1200 


 


Intake:   


 


  Blood Product 310  


 


    Rc As-1  Unit 310  





    [id number]   


 


Output:   


 


  Urine 1375 1200 


 


    Uretheral (Sandoval) 525  


 


  Post Void Residual 200  


 


Other:   


 


  Voiding Method Urinal Urinal Urinal


 


  # Voids 1  


 


  # Bowel Movements 1  














- Labs


CBC & Chem 7: 


                                 10/25/20 05:30





                                 10/25/20 05:30


Labs: 


                  Abnormal Lab Results - Last 24 Hours (Table)











  10/24/20 10/25/20 10/25/20 Range/Units





  10:19 05:30 05:30 


 


RBC   3.38 L   (4.30-5.90)  m/uL


 


Hgb   8.2 L   (13.0-17.5)  gm/dL


 


Hct   27.0 L   (39.0-53.0)  %


 


MCV   79.9 L   (80.0-100.0)  fL


 


MCH   24.3 L   (25.0-35.0)  pg


 


MCHC   30.4 L   (31.0-37.0)  g/dL


 


RDW   16.7 H   (11.5-15.5)  %


 


Potassium    3.3 L  (3.5-5.5)  mmol/L


 


Carbon Dioxide    32.5 H  (21.6-31.8)  mmol/L


 


BUN    8.0 L  (9.0-27.0)  mg/dL


 


Total Bilirubin    3.4 H  (0.3-1.2)  mg/dL


 


AST    78 H  (14-35)  U/L


 


ALT    122 H  (10-49)  U/L


 


Alkaline Phosphatase    184 H  ()  U/L


 


Total Protein    5.0 L  (6.2-8.2)  g/dL


 


Albumin    2.70 L  (3.80-4.90)  g/dL


 


Albumin/Globulin Ratio    1.17 L  (1.60-3.17)  g/dL


 


Crossmatch  See Detail    








                      Microbiology - Last 24 Hours (Table)











 10/24/20 10:08 Gram Stain - Preliminary





 Neck Wound Culture - Preliminary





    Beta Hemolytic Strep Group G





    Gram Neg Bacilli


 


 10/20/20 22:17 Blood Culture - Preliminary





 Blood    No Growth after 96 hours


 


 10/23/20 12:15 Gram Stain - Preliminary





 Shoulder - Right Wound Culture - Preliminary





    Beta Hemolytic Strep Group G

## 2020-10-26 LAB
ALBUMIN SERPL-MCNC: 2.5 G/DL (ref 3.8–4.9)
ALBUMIN/GLOB SERPL: 1.09 G/DL (ref 1.6–3.17)
ALP SERPL-CCNC: 142 U/L (ref 41–126)
ALT SERPL-CCNC: 72 U/L (ref 10–49)
ANION GAP SERPL CALC-SCNC: 14 MMOL/L (ref 4–12)
AST SERPL-CCNC: 32 U/L (ref 14–35)
BUN SERPL-SCNC: 8 MG/DL (ref 9–27)
BUN/CREAT SERPL: 16 RATIO (ref 12–20)
CALCIUM SPEC-MCNC: 8.5 MG/DL (ref 8.7–10.3)
CHLORIDE SERPL-SCNC: 98 MMOL/L (ref 96–109)
CO2 SERPL-SCNC: 24 MMOL/L (ref 21.6–31.8)
ERYTHROCYTE [DISTWIDTH] IN BLOOD BY AUTOMATED COUNT: 3.41 M/UL (ref 4.3–5.9)
ERYTHROCYTE [DISTWIDTH] IN BLOOD: 16.7 % (ref 11.5–15.5)
GLOBULIN SER CALC-MCNC: 2.3 G/DL (ref 1.6–3.3)
GLUCOSE SERPL-MCNC: 71 MG/DL (ref 70–110)
HCT VFR BLD AUTO: 27.4 % (ref 39–53)
HGB BLD-MCNC: 8.3 GM/DL (ref 13–17.5)
MCH RBC QN AUTO: 24.5 PG (ref 25–35)
MCHC RBC AUTO-ENTMCNC: 30.5 G/DL (ref 31–37)
MCV RBC AUTO: 80.2 FL (ref 80–100)
PLATELET # BLD AUTO: 284 K/UL (ref 150–450)
POTASSIUM SERPL-SCNC: 3.1 MMOL/L (ref 3.5–5.5)
PROT SERPL-MCNC: 4.8 G/DL (ref 6.2–8.2)
SODIUM SERPL-SCNC: 136 MMOL/L (ref 135–145)
WBC # BLD AUTO: 8.4 K/UL (ref 3.8–10.6)

## 2020-10-26 PROCEDURE — 02HV33Z INSERTION OF INFUSION DEVICE INTO SUPERIOR VENA CAVA, PERCUTANEOUS APPROACH: ICD-10-PCS

## 2020-10-26 RX ADMIN — CEFAZOLIN SCH MLS/HR: 330 INJECTION, POWDER, FOR SOLUTION INTRAMUSCULAR; INTRAVENOUS at 11:48

## 2020-10-26 RX ADMIN — ASPIRIN SCH MG: 325 TABLET ORAL at 19:24

## 2020-10-26 RX ADMIN — HEPARIN SODIUM SCH UNIT: 5000 INJECTION, SOLUTION INTRAVENOUS; SUBCUTANEOUS at 19:23

## 2020-10-26 RX ADMIN — ACETAMINOPHEN PRN MG: 325 TABLET, FILM COATED ORAL at 19:23

## 2020-10-26 RX ADMIN — PANTOPRAZOLE SODIUM SCH MG: 40 TABLET, DELAYED RELEASE ORAL at 07:37

## 2020-10-26 RX ADMIN — DOCUSATE SODIUM AND SENNOSIDES SCH EACH: 50; 8.6 TABLET ORAL at 07:37

## 2020-10-26 RX ADMIN — HEPARIN SODIUM SCH UNIT: 5000 INJECTION, SOLUTION INTRAVENOUS; SUBCUTANEOUS at 07:37

## 2020-10-26 RX ADMIN — CEFEPIME HYDROCHLORIDE SCH MLS/HR: 1 INJECTION, POWDER, FOR SOLUTION INTRAMUSCULAR; INTRAVENOUS at 07:37

## 2020-10-26 NOTE — P.PN
Subjective


Progress Note Date: 10/26/20





HISTORY OF PRESENT ILLNESS


This is a 70-year-old  male patient of Dr. Mata and Dr. Bundy 

with past medical history significant for hypertension, basal cell skin cancer 

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 

palliative radiation therapy in 2019.  On 10/14/2020, patient underwent 

cystoscopy, transurethral resection of bladder tumors with Dr. Alex. Pathology

report positive for high-grade papillary urothelial carcinoma infiltrating the 

lamina propria and sample fragments of muscularis propria.


Patient is a poor historian.  He states that he was seen in the emergency center

on October 19 for pinched nerve in his right arm.  He denies any injury.  He 

states he rolled over in bed and the pain developed.  X-rays of the right 

shoulder and humerus did not show any acute abnormality.  Patient was discharged

home on Flexeril.  Patient states that he is due to see Dr. Alex today in his 

office to have Sandoval catheter removed.





Patient returned yesterday with inability to walk, no appetite unable to care 

for himself.  Patient complains of nausea with loss of appetite and difficulty 

swallowing due to left neck mass.  Chest x-ray revealed chronic linear density 

right upper lobe consistent with scarring or atelectasis.  No heart failure.  No

change.  CAT scan of the brain revealed cerebral atrophy.  No acute intracranial

abnormality.  EKG is a sinus rhythm with occasional PVCs, heart rate 101.  WBC 

10.8, hemoglobin 8.6, platelet count 349.  D-dimer 0.71.  Sodium 131, potassium 

3.6, chloride 95, CO2 31, BUN 11 creatinine 0.8.  Liver function tests were 

normal.  .  Urinalysis cloudy, blood small, leukoesterase moderate, 

nitrate negative.  RBCs 40, wbc's 8.  Influenza testing negative.  Patient was 

given 1 L of IV fluids, placed on the [facility] floor and consults requested with 

oncology, urology, orthopedics. 





10/22: Patient continues to have confusion and hallucinations.  He was evaluated

by speech therapy with no problems with swallowing but concern for vocal cord 

paralysis and recommend possible ENT workup as an outpatient if appropriate.  He

has undergone CAT scan of the right shoulder which revealed moderate right 

glenohumeral joint effusion with suspected synovitis.  If concern for septic ar

thritis, recommend joint sampling.  No acute osseous abnormality.  Orthopedics 

has recommended sling.  CRP and sed rate have been ordered and if elevated may 

consider aspiration of the joint.  Patient was seen by oncology and MRI of the 

brain was ordered.  This was a suboptimal study that revealed mild diffuse age-

related cerebral atrophy and moderate chronic small vessel ischemic changes.  No

definitive new enhancing masses to suggest metastatic disease to the brain.  

Oncology is considering LP.  Temperature max 100.3, heart rate 108, blood 

pressure 126/71, pulse ox 96% on room air. Cardiac monitor is a sinus rhythm.  

Repeat blood work reveals WBC 9.4, hemoglobin 8.1, platelet count 283.  Sodium 

133, potassium 3.2 chloride 94, CO2 30.9, BUN 11 creatinine 0.7.  Blood sugar 

102.  Vitamin B12 434.  Uric acid 3.5.  PT and OT recommend home with homecare 

subacute rehab.  Methylmalonic acid, sed rate CRP are pending.  Blood culture 

shows no growth after 24 hours.  Patient has been seen by urology with 

recommendations to keep catheter in place and patient is mentally improved and 

more ambulatory.  Plan for voiding trial prior to discharge.





10/23: Patient denies any new complaints.  Mental status is improved.  Contacted

patient's wife and discussed current condition, prognosis and recommendations 

for hospice.  She initially agreed to hospice but would like to wait and do 

palliative care at the time of discharge.  Temperature max 100.3 in the past 24 

hours.  Heart rate 105, blood pressure 99/61, pulse ox 97% on room air.  Patient

underwent aspiration of the right glenohumeral joint by orthopedics.  6 ML's of 

serous fluid was aspirated and sent for analysis.  Oncology is following.





10/24: Patient is awake and alert today.  His mental status is most improved 

since admission.  Urology is planning to remove Sandoval catheter today and try 

voiding trial.  Patient continues to complain of right shoulder pain with no 

sign of infection on fluid study.  His hemoglobin today is at 7.1 gradually 

dropping since admission. He will be transfused 1 unit of packed RBCs, stool for

fecal occult blood will be added.  Wound culture to be obtained from the left 

neck wound and cefepime added.  Patient states that he had a large bowel 

movement yesterday.  Other lab work revealed potassium of 3.1 which will be 

replaced.  Total bilirubin 1.5, , , alkaline phosphatase 182.





10/25: Dr. Kaur is planning to take patient or this afternoon as right shoulder

aspiration came back with Streptococcus.  Patient is currently covered with 

cefepime.  Wound culture from the left neck wound is also strep.  Repeat blood 

work reveals Brenda BC 7.1, hemoglobin 8.2 following 1 unit packed RBCs.  

Platelet count 228.  Sodium 136, potassium 3.3 and will be replaced, chloride 

98, CO2 32.5, BUN 18 creatinine 0.6.  C-reactive protein is 223.7.  Sandoval 

catheter was removed yesterday and patient has been voiding.  He has had minimal

residuals running 150-200.  Patient does not have Sandoval catheter resumed and 

lasts more than 250 per urology.  Patient is to follow-up in the office in one 

week





10/26: Patient is status post I&D of the right shoulder.  Repeat cultures have 

been sent.  Right shoulder aspirate culture is showing beta hemolytic strep.  

Left neck wound culture is showing beta him X strep and gram-negative bacilli.  

Patient has been afebrile, heart rate 94, blood pressure 130/75, pulse ox 90% on

room air.  WBC 8.4, hemoglobin 8.3, platelet count 284.  Discussed case with Dr. Chowdhury recommendations for Rocephin 2 g every day to complete 6 weeks.  Case 

manager has been updated and we'll make arrangements for outpatient IV 

antibiotics.  PICC line has been ordered.  Antibiotics have been transitioned to

Rocephin from cefepime.  He has a large dressing in place to the right shoulder.

 Anticipate probable discharge tomorrow.








REVIEW OF SYSTEMS


Constitutional: No fever, no chills, no night sweats.  Reports weight change.  

Reports weakness, Reportsfatigue Reports lethargy.  No daytime sleepiness.


EENT: No headache.  No blurred vision or double vision, no loss of vision.  

Reports dizziness.  No nasal drainage or congestion.  No epistaxis.  Reports 

dysphagia.


Lungs: No shortness of breath, cough, no sputum production.  No wheezing.


Cardiovascular: No chest pain, no lower extremity edema.  No palpitations.  No 

paroxysmal nocturnal dyspnea.  No orthopnea.  Reports lightheadedness or 

dizziness.  No syncopal episodes.


Abdominal: No abdominal pain.  No nausea, vomiting.  No diarrhea.  No 

constipation.  No bloody or tarry stools..  No loss of appetite.


Genitourinary: No dysuria, increased frequency, urgency.  No urinary retention.


Musculoskeletal: No myalgias.  Reports muscle weakness, Reports gait 

dysfunction.  No back pain.  Reports neck pain.  Reports right shoulder pain.


Integumentary: Reports wounds, no lesions.  No rash or pruritus.  No unusual 

bruising.  No change in hair or nails.


Neurologic: Reports dysphagia. No facial droop.  Reports change in mentation 

resolved. No head injury. No headache. No paralysis. No paresthesia.


Psychiatric: No depression.  No anxiety.  No mood swings.


Endocrine: No abnormal blood sugars.  Reports weight change.  





PHYSICAL EXAMINATION


Gen: This is a 70-year-old  male.  He is resting in chair appears to be

comfortable.


HEENT: Head is atraumatic, normocephalic. Pupils equal, round. Sclerae is 

anicteric.  Open wounds/ulcerations the left neck region. 


NECK: Supple. No JVD. No lymphadenopathy. No thyromegaly. 


LUNGS: Clear to auscultation. No wheezes or rhonchi.  No intercostal 

retractions.


HEART: Regular rate and rhythm. No murmur. 


ABDOMEN: Soft. Bowel sounds are present. No masses.  No tenderness.  Sandoval 

catheter draining clear clementine urine.


EXTREMITIES: No pedal edema.  No calf tenderness.  Large dressing in place to 

the right shoulder.


NEUROLOGICAL: Patient is awake, alert and oriented 3. Cranial nerves 2 through 

12 are grossly intact. 





ASSESSMENT AND PLAN


1.  Generalized debility, weight loss, loss of appetite and weakness secondary 

to cancer and septic right shoulder joint.  PT, OT, speech therapy consultations

appreciated.  I&D of the right shoulder today.  Antibiotics transitioned to 

Rocephin 2 g daily





2.  Basal cell skin cancer of the left cheek with metastatic disease to the neck

and lungs status post palliative radiation therapy, under the care of Dr. Posada.  

Consult with oncology appreciated.  Await further recommendations.  Wound 

culture and cefepime added.  Patient plans to follow up with [facility] in 

[place] for second opinion.





3.  Bladder cancer status post recent transurethral resection of bladder tumors.

 Consult with urology appreciated.  Sandoval catheter to be removed today and start

voiding trial.





4.  Right shoulder pain.  Orthopedic consult.  Status post aspiration.  Continue

as in #1.





5.  Severe protein calorie malnutrition with weight loss, loss of appetite and 

dysphagia secondary to neck tumor.  Regular diet and ensure twice daily.  Speech

therapy appreciated.





6.  Anemia of chronic disease with possible acute component.  Patient will be 

transfused 1 unit of packed RBCs, stool for fecal occult blood ordered.





7.  Hyponatremia secondary to malnutrition.  Continue IV fluids 0.9 normal 

saline at 50 mL per hour.





8.  Hypertension.  Continue home medications which are hydralazine 25 mg 3 times

daily as needed and Lopressor 12.5 mg at bedtime as needed.





9.  Metabolic encephalopathy and hallucinations secondary to underlying cancers 

and medical debilitation, resolved.  





10.  GI prophylaxis.  Protonix daily.





11.  DVT prophylaxis.  Heparin subcu.





12.  Possible vocal cord paralysis.  ENT follow-up outpatient.





Discharge plan: Home with [facility] on Tuesday with IV antibiotics.  





Impression and plan of care have been directed as dictated by the signing ph

ysician.  Haley Lucas nurse practitioner acting as scribe for signing physician





Objective





- Vital Signs


Vital signs: 


                                   Vital Signs











Temp  98.3 F   10/26/20 00:16


 


Pulse  94   10/26/20 00:16


 


Resp  16   10/26/20 00:00


 


BP  138/75   10/26/20 00:16


 


Pulse Ox  98   10/26/20 00:16








                                 Intake & Output











 10/25/20 10/26/20 10/26/20





 18:59 06:59 18:59


 


Intake Total 1501  


 


Output Total 235 650 


 


Balance 1266 -650 


 


Intake:   


 


  IV 1501  


 


Output:   


 


  Urine  650 


 


  Post Void Residual 225  


 


  Estimated Blood Loss 10  


 


Other:   


 


  Voiding Method Urinal Urinal 


 


  # Voids 1 1 














- Labs


CBC & Chem 7: 


                                 10/26/20 05:29





                                 10/25/20 05:30


Labs: 


                  Abnormal Lab Results - Last 24 Hours (Table)











  10/25/20 10/25/20 10/25/20 Range/Units





  05:30 05:30 10:27 


 


RBC     (4.30-5.90)  m/uL


 


Hgb     (13.0-17.5)  gm/dL


 


Hct     (39.0-53.0)  %


 


MCH     (25.0-35.0)  pg


 


MCHC     (31.0-37.0)  g/dL


 


RDW     (11.5-15.5)  %


 


ESR   78 H   (0-15)  mm/hr


 


Potassium  3.3 L    (3.5-5.5)  mmol/L


 


Carbon Dioxide  32.5 H    (21.6-31.8)  mmol/L


 


BUN  8.0 L    (9.0-27.0)  mg/dL


 


Total Bilirubin  3.4 H    (0.3-1.2)  mg/dL


 


AST  78 H    (14-35)  U/L


 


ALT  122 H    (10-49)  U/L


 


Alkaline Phosphatase  184 H    ()  U/L


 


C-Reactive Protein    223.7 H  (<10.0)  mg/L


 


Total Protein  5.0 L    (6.2-8.2)  g/dL


 


Albumin  2.70 L    (3.80-4.90)  g/dL


 


Albumin/Globulin Ratio  1.17 L    (1.60-3.17)  g/dL














  10/26/20 Range/Units





  05:29 


 


RBC  3.41 L  (4.30-5.90)  m/uL


 


Hgb  8.3 L  (13.0-17.5)  gm/dL


 


Hct  27.4 L  (39.0-53.0)  %


 


MCH  24.5 L  (25.0-35.0)  pg


 


MCHC  30.5 L  (31.0-37.0)  g/dL


 


RDW  16.7 H  (11.5-15.5)  %


 


ESR   (0-15)  mm/hr


 


Potassium   (3.5-5.5)  mmol/L


 


Carbon Dioxide   (21.6-31.8)  mmol/L


 


BUN   (9.0-27.0)  mg/dL


 


Total Bilirubin   (0.3-1.2)  mg/dL


 


AST   (14-35)  U/L


 


ALT   (10-49)  U/L


 


Alkaline Phosphatase   ()  U/L


 


C-Reactive Protein   (<10.0)  mg/L


 


Total Protein   (6.2-8.2)  g/dL


 


Albumin   (3.80-4.90)  g/dL


 


Albumin/Globulin Ratio   (1.60-3.17)  g/dL








                      Microbiology - Last 24 Hours (Table)











 10/25/20 17:00 Gram Stain - Preliminary





 Shoulder - Right Wound Culture - Preliminary


 


 10/20/20 22:17 Blood Culture - Preliminary





 Blood    No Growth after 120 hours


 


 10/23/20 12:15 Anaerobic Culture - Preliminary





 Shoulder - Right 


 


 10/25/20 17:00 Anaerobic Culture - Preliminary





 Shoulder - Right 


 


 10/23/20 12:15 Gram Stain - Final





 Shoulder - Right Wound Culture - Final





    Beta Hemolytic Strep Group G


 


 10/24/20 10:08 Gram Stain - Preliminary





 Neck Wound Culture - Preliminary





    Beta Hemolytic Strep Group G





    Gram Neg Bacilli

## 2020-10-26 NOTE — IR
EXAMINATION TYPE: IR cvc insert >=5 years

 

DATE OF EXAM: 10/26/2020

 

COMPARISON: NONE

 

CLINICAL HISTORY: Septic shoulder Needs long-term intravenous access for antibiotics.

 

PROCEDURE: Hand hygiene obtained with soap and water and alcohol-based hand rub.

After informed consent, the skin overlying the left brachial vein was localized with ultrasound and n
oted to be compressible and patent.  An ultrasound image was obtained and submitted on the patient's 
chart.  The overlying skin was prepped and draped and Lidocaine was used for local anesthesia.  A ski
n nick was made with a scalpel.  Access was gained to the vein under ultrasound guidance with a 21 ga
uge needle and a 0.018 inch wire was advanced.  Access site was dilated with Peel-Away sheath and cat
heter tailored to the appropriate length and advanced such that the distal tip is at the cavoatrial j
unction.  Spot image was obtained verifying placement.  Catheter was fixed to the skin and a sterile 
dressing was placed following hemostasis.  Catheter was aspirated and flushed with saline.  Patient w
as discharged in stable condition without complication.Maximal barrier technique is utilized.  Ultras
ound image is documented on the chart. Ultrasound used with sterile technique. 

 

Fluoro time and fluoroscopic images submitted to document procedure: 75 intraoperative images, 0.6 mi
nutes fluoroscopy time

 

IMPRESSION: STATUS POST ULTRASOUND AND FLUOROSCOPIC GUIDED PICC LINE PLACEMENT, READY FOR USE.  THIS 
PROCEDURE WAS PERFORMED BY THE UNDERSIGNED.

## 2020-10-26 NOTE — P.PN
Subjective


Progress Note Date: 10/26/20


Principal diagnosis: 





Status post right shoulder arthroscopic lavage/synovectomy





Patient evaluated today at bedside, he is resting comfortably.  He notes 

significant improvement in the symptoms of the right shoulder.  He's been 

utilizing the arm sling.  He's been ambulating around the room with the aid 

therapy.  Denies any chest pain, shortness of breath, fever or chills.





Objective





- Vital Signs


Vital signs: 


                                   Vital Signs











Temp  98.7 F   10/26/20 07:00


 


Pulse  98   10/26/20 07:00


 


Resp  17   10/26/20 07:00


 


BP  113/68   10/26/20 07:00


 


Pulse Ox  98   10/26/20 07:00








                                 Intake & Output











 10/25/20 10/26/20 10/26/20





 18:59 06:59 18:59


 


Intake Total 1501  


 


Output Total 235 650 


 


Balance 1266 -650 


 


Intake:   


 


  IV 1501  


 


Output:   


 


  Urine  650 


 


  Post Void Residual 225  


 


  Estimated Blood Loss 10  


 


Other:   


 


  Voiding Method Urinal Urinal Urinal


 


  # Voids 1 1 














- Exam





Right upper extremity:





Postop bandages in good position and condition, there is no active drainage 

visualized





Minimal soft tissue swelling present throughout the extremity





Sensation to light touch throughout the extremity is intact, radial and ulnar 

pulses 2+ 





Range of motion of the shoulder was not assessed, flexion and extension at the 

elbow and wrist are intact





- Labs


CBC & Chem 7: 


                                 10/26/20 05:29





                                 10/25/20 05:30


Labs: 


                  Abnormal Lab Results - Last 24 Hours (Table)











  10/26/20 Range/Units





  05:29 


 


RBC  3.41 L  (4.30-5.90)  m/uL


 


Hgb  8.3 L  (13.0-17.5)  gm/dL


 


Hct  27.4 L  (39.0-53.0)  %


 


MCH  24.5 L  (25.0-35.0)  pg


 


MCHC  30.5 L  (31.0-37.0)  g/dL


 


RDW  16.7 H  (11.5-15.5)  %








                      Microbiology - Last 24 Hours (Table)











 10/24/20 10:08 Gram Stain - Preliminary





 Neck Wound Culture - Preliminary





    Beta Hemolytic Strep Group G





    Pseudomonas aeruginosa


 


 10/25/20 17:00 Gram Stain - Preliminary





 Shoulder - Right Wound Culture - Preliminary


 


 10/20/20 22:17 Blood Culture - Preliminary





 Blood    No Growth after 120 hours


 


 10/23/20 12:15 Anaerobic Culture - Preliminary





 Shoulder - Right 


 


 10/25/20 17:00 Anaerobic Culture - Preliminary





 Shoulder - Right 


 


 10/23/20 12:15 Gram Stain - Final





 Shoulder - Right Wound Culture - Final





    Beta Hemolytic Strep Group G














Assessment and Plan


Assessment: 





Status post right shoulder arthroscopic lavage/synovectomy





Right shoulder septic arthritis/synovitis





Multiple medical comorbidities


Plan: 





We'll continue to use the current bandage that is in place, I will change this 

tomorrow morning





Continue use of arm sling as needed, hand and wrist exercises are fine





Pain control, utilize current medication





Await culture and sensitivity results





Other medical specialty recommendations





We'll continue to follow patient during inpatient stay


Time with Patient: Less than 30

## 2020-10-26 NOTE — P.PN
Progress Note - Text


Progress Note Date: 10/26/20


Mr. Davila underwent right shoulder arthroscopic lavage yesterday.  He is awake 

and alert.  He is voiding without difficulty, though he reports urgency.  He 

denies hematuria.  I had a lengthy discussion with the patient regarding his 

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  I explained to him that 

the gold standard is a radical cystoprostatectomy, but given his multiple 

comorbid conditions I do not feel this is in his best interest.  Chemotherapy 

can be effective in treating urothelial carcinoma, though he is currently 

receiving chemotherapy for basal cell carcinoma and I am uncertain whether or 

not there is an agent which has activity against both basal cell carcinoma and 

urothelial carcinoma.  At the least, I would suggest that he receive radiation 

therapy for the benefit of local control.  He will follow up with me as an 

outpatient.  Please notify me if I can be of any further assistance.

## 2020-10-27 VITALS
DIASTOLIC BLOOD PRESSURE: 76 MMHG | SYSTOLIC BLOOD PRESSURE: 162 MMHG | HEART RATE: 84 BPM | RESPIRATION RATE: 17 BRPM | TEMPERATURE: 98.3 F

## 2020-10-27 LAB
ALBUMIN SERPL-MCNC: 2.5 G/DL (ref 3.8–4.9)
ALBUMIN/GLOB SERPL: 1.14 G/DL (ref 1.6–3.17)
ALP SERPL-CCNC: 170 U/L (ref 41–126)
ALT SERPL-CCNC: 57 U/L (ref 10–49)
ANION GAP SERPL CALC-SCNC: 5.6 MMOL/L (ref 4–12)
AST SERPL-CCNC: 31 U/L (ref 14–35)
BUN SERPL-SCNC: 11 MG/DL (ref 9–27)
BUN/CREAT SERPL: 22 RATIO (ref 12–20)
CALCIUM SPEC-MCNC: 8.2 MG/DL (ref 8.7–10.3)
CHLORIDE SERPL-SCNC: 98 MMOL/L (ref 96–109)
CO2 SERPL-SCNC: 31.4 MMOL/L (ref 21.6–31.8)
GLOBULIN SER CALC-MCNC: 2.2 G/DL (ref 1.6–3.3)
GLUCOSE SERPL-MCNC: 127 MG/DL (ref 70–110)
MAGNESIUM SPEC-SCNC: 1.6 MG/DL (ref 1.5–2.4)
POTASSIUM SERPL-SCNC: 2.8 MMOL/L (ref 3.5–5.5)
PROT SERPL-MCNC: 4.7 G/DL (ref 6.2–8.2)
SODIUM SERPL-SCNC: 135 MMOL/L (ref 135–145)

## 2020-10-27 RX ADMIN — POTASSIUM CHLORIDE SCH MEQ: 20 TABLET, EXTENDED RELEASE ORAL at 12:23

## 2020-10-27 RX ADMIN — HYDROCODONE BITARTRATE AND ACETAMINOPHEN PRN EACH: 5; 325 TABLET ORAL at 08:26

## 2020-10-27 RX ADMIN — CEFAZOLIN SCH MLS/HR: 330 INJECTION, POWDER, FOR SOLUTION INTRAMUSCULAR; INTRAVENOUS at 08:26

## 2020-10-27 RX ADMIN — POTASSIUM CHLORIDE SCH MEQ: 20 TABLET, EXTENDED RELEASE ORAL at 09:57

## 2020-10-27 RX ADMIN — PANTOPRAZOLE SODIUM SCH MG: 40 TABLET, DELAYED RELEASE ORAL at 08:26

## 2020-10-27 RX ADMIN — DOCUSATE SODIUM AND SENNOSIDES SCH EACH: 50; 8.6 TABLET ORAL at 08:26

## 2020-10-27 RX ADMIN — HEPARIN SODIUM SCH UNIT: 5000 INJECTION, SOLUTION INTRAVENOUS; SUBCUTANEOUS at 08:26

## 2020-10-27 NOTE — P.PN
Subjective


Progress Note Date: 10/27/20


Principal diagnosis: 





septic joints, basal cell, bladder carcinoma, iron deficiency anemia





Pt feels good is anxious to go home, no fevers, tolerating oral intake





Objective





- Vital Signs


Vital signs: 


                                   Vital Signs











Temp  98.3 F   10/27/20 07:17


 


Pulse  84   10/27/20 07:17


 


Resp  17   10/27/20 07:17


 


BP  162/76   10/27/20 07:17


 


Pulse Ox  98   10/27/20 07:17








                                 Intake & Output











 10/26/20 10/27/20 10/27/20





 18:59 06:59 18:59


 


Intake Total  300 


 


Output Total  2 250


 


Balance  298 -250


 


Intake:   


 


  Oral  300 


 


Output:   


 


  Urine   250


 


  Stool  2 


 


Other:   


 


  Voiding Method Urinal Urinal Urinal


 


  # Voids 2 3 


 


  # Bowel Movements 1 1 














- Constitutional


General appearance: Present: average body habitus, cooperative, no acute 

distress





- EENT


Eyes: Present: anicteric sclerae, EOMI





- Respiratory


Details: 





resp even and unlabored





- Integumentary


Integumentary Comment(s): 





left neck basal cell tumor





- Neurologic


Neurologic: Present: CNII-XII intact





- Psychiatric


Psychiatric: Present: A&O x's 3, appropriate affect, intact judgment & insight





- Labs


CBC & Chem 7: 


                                 10/26/20 05:29





                                 10/27/20 05:21


Labs: 


                  Abnormal Lab Results - Last 24 Hours (Table)











  10/26/20 10/27/20 Range/Units





  05:29 05:21 


 


Potassium  3.1 L  2.8 L  (3.5-5.5)  mmol/L


 


Anion Gap  14.00 H   (4.00-12.00)  mmol/L


 


BUN  8.0 L   (9.0-27.0)  mg/dL


 


Creatinine  0.5 L  0.5 L  (0.6-1.5)  mg/dL


 


BUN/Creatinine Ratio   22.00 H  (12.00-20.00)  Ratio


 


Glucose   127 H  ()  mg/dL


 


Calcium  8.5 L  8.2 L  (8.7-10.3)  mg/dL


 


Total Bilirubin  2.6 H  1.6 H  (0.3-1.2)  mg/dL


 


ALT  72 H  57 H  (10-49)  U/L


 


Alkaline Phosphatase  142 H  170 H  ()  U/L


 


Total Protein  4.8 L  4.7 L  (6.2-8.2)  g/dL


 


Albumin  2.50 L  2.50 L  (3.80-4.90)  g/dL


 


Albumin/Globulin Ratio  1.09 L  1.14 L  (1.60-3.17)  g/dL








                      Microbiology - Last 24 Hours (Table)











 10/25/20 10:27 Blood Culture - Preliminary





 Blood    No Growth after 48 hours


 


 10/24/20 10:08 Gram Stain - Preliminary





 Neck Wound Culture - Preliminary





    Beta Hemolytic Strep Group G





    Pseudomonas aeruginosa





    Presumptive Staph aureus


 


 10/20/20 22:17 Blood Culture - Final





 Blood    No Growth after 144 hours


 


 10/25/20 17:00 Gram Stain - Preliminary





 Shoulder - Right Wound Culture - Preliminary





    Beta Hemolytic Strep Group G














Assessment and Plan


(1) Basal cell carcinoma


Status: Chronic   Priority: High   Code(s): C44.91 - BASAL CELL CARCINOMA OF 

SKIN, UNSPECIFIED   SNOMED Code(s): 371886319


   





(2) Bladder neoplasm of uncertain malignant potential


Narrative/Plan: 


At least muscle invasive


Status: Acute   Priority: High   Code(s): D41.4 - NEOPLASM OF UNCERTAIN BEHAVIOR

OF BLADDER   SNOMED Code(s): 99829448


   


Plan: 





Spoke with pt and wife about his current situation.  Pt was angry that hospice 

was suggested by Oncology.  I explained to him that with 2 malignancies it is 

our job to offer all potential options for care to a patient.  Hospice is a 

realistic treatment option in his case, despite pt feeling that it is not.  We 

stand by our discussion offering hospice as a treatment option with the pt.   


He and his wife had questions about referral for trial for basal cell-as there 

are no currently approved treatment options left.  [facility] referred to An[facility] who 

has never called pt.  I will work with [facility] clinical trials nurse to see if we 

can find him something.


In regards to muscle invasive bladder cancer, surgery is would be the definitive

treatment option.  We will refer to [facility] Urology clinic for evaluation and 

treatment options.  


Will call pt wife call with info per her request. 


Will refer pt to Oncologist of his request if he so chooses.


Time with Patient: Greater than 30 (>40min spent counseling and coordinating 

care)

## 2020-10-27 NOTE — P.DS
Providers


Date of admission: 


10/22/20 14:07





Expected date of discharge: 10/27/20


Attending physician: 


Cristino Hernandez





Consults: 





                                        





10/21/20 09:01


Consult Physician Routine 


   Consulting Provider: Jefferson Posada


   Consult Reason/Comments: lung, neck, bladder ca


   Do you want consulting provider notified?: Yes





10/21/20 09:02


Consult Physician Routine 


   Consulting Provider: Sascha Alex


   Consult Reason/Comments: bladder cancer


   Do you want consulting provider notified?: Yes





10/21/20 09:07


Consult Physician Routine 


   Consulting Provider: Jonas Kaur


   Consult Reason/Comments: right shoulder pain


   Do you want consulting provider notified?: Yes











Primary care physician: 


[facility] Course: 





HISTORY OF PRESENT ILLNESS


This is a 70-year-old  male patient of Dr. Mata and Dr. Bundy 

with past medical history significant for hypertension, basal cell skin cancer 

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

iative radiation therapy in 2019.  On 10/14/2020, patient underwent cystoscopy, 

transurethral resection of bladder tumors with Dr. Alex. Pathology report 

positive for high-grade papillary urothelial carcinoma infiltrating the lamina 

propria and sample fragments of muscularis propria.


Patient is a poor historian.  He states that he was seen in the emergency center

on October 19 for pinched nerve in his right arm.  He denies any injury.  He 

states he rolled over in bed and the pain developed.  X-rays of the right 

shoulder and humerus did not show any acute abnormality.  Patient was discharged

home on Flexeril.  Patient states that he is due to see Dr. Alex today in his 

office to have Sandoval catheter removed.





Patient returned yesterday with inability to walk, no appetite unable to care fo

r himself.  Patient complains of nausea with loss of appetite and difficulty 

swallowing due to left neck mass.  Chest x-ray revealed chronic linear density 

right upper lobe consistent with scarring or atelectasis.  No heart failure.  No

change.  CAT scan of the brain revealed cerebral atrophy.  No acute intracranial

abnormality.  EKG is a sinus rhythm with occasional PVCs, heart rate 101.  WBC 

10.8, hemoglobin 8.6, platelet count 349.  D-dimer 0.71.  Sodium 131, potassium 

3.6, chloride 95, CO2 31, BUN 11 creatinine 0.8.  Liver function tests were 

normal.  .  Urinalysis cloudy, blood small, leukoesterase moderate, 

nitrate negative.  RBCs 40, wbc's 8.  Influenza testing negative.  Patient was 

given 1 L of IV fluids, placed on the [facility] floor and consults requested with 

oncology, urology, orthopedics. 





10/22: Patient continues to have confusion and hallucinations.  He was evaluated

by speech therapy with no problems with swallowing but concern for vocal cord 

paralysis and recommend possible ENT workup as an outpatient if appropriate.  He

has undergone CAT scan of the right shoulder which revealed moderate right 

glenohumeral joint effusion with suspected synovitis.  If concern for septic 

arthritis, recommend joint sampling.  No acute osseous abnormality.  Orthopedics

has recommended sling.  CRP and sed rate have been ordered and if elevated may 

consider aspiration of the joint.  Patient was seen by oncology and MRI of the 

brain was ordered.  This was a suboptimal study that revealed mild diffuse age-

related cerebral atrophy and moderate chronic small vessel ischemic changes.  No

definitive new enhancing masses to suggest metastatic disease to the brain.  

Oncology is considering LP.  Temperature max 100.3, heart rate 108, blood 

pressure 126/71, pulse ox 96% on room air. Cardiac monitor is a sinus rhythm.  

Repeat blood work reveals WBC 9.4, hemoglobin 8.1, platelet count 283.  Sodium 

133, potassium 3.2 chloride 94, CO2 30.9, BUN 11 creatinine 0.7.  Blood sugar 1

02.  Vitamin B12 434.  Uric acid 3.5.  PT and OT recommend home with homecare 

subacute rehab.  Methylmalonic acid, sed rate CRP are pending.  Blood culture 

shows no growth after 24 hours.  Patient has been seen by urology with 

recommendations to keep catheter in place and patient is mentally improved and 

more ambulatory.  Plan for voiding trial prior to discharge.





10/23: Patient denies any new complaints.  Mental status is improved.  Contacted

patient's wife and discussed current condition, prognosis and recommendations 

for hospice.  She initially agreed to hospice but would like to wait and do 

palliative care at the time of discharge.  Temperature max 100.3 in the past 24 

hours.  Heart rate 105, blood pressure 99/61, pulse ox 97% on room air.  Patient

underwent aspiration of the right glenohumeral joint by orthopedics.  6 ML's of 

serous fluid was aspirated and sent for analysis.  Oncology is following.





10/24: Patient is awake and alert today.  His mental status is most improved 

since admission.  Urology is planning to remove Sandoval catheter today and try 

voiding trial.  Patient continues to complain of right shoulder pain with no 

sign of infection on fluid study.  His hemoglobin today is at 7.1 gradually 

dropping since admission. He will be transfused 1 unit of packed RBCs, stool for

fecal occult blood will be added.  Wound culture to be obtained from the left 

neck wound and cefepime added.  Patient states that he had a large bowel 

movement yesterday.  Other lab work revealed potassium of 3.1 which will be 

replaced.  Total bilirubin 1.5, , , alkaline phosphatase 182.





10/25: Dr. Kaur is planning to take patient or this afternoon as right shoulder

aspiration came back with Streptococcus.  Patient is currently covered with 

cefepime.  Wound culture from the left neck wound is also strep.  Repeat blood 

work reveals Brenda BC 7.1, hemoglobin 8.2 following 1 unit packed RBCs.  

Platelet count 228.  Sodium 136, potassium 3.3 and will be replaced, chloride 9

8, CO2 32.5, BUN 18 creatinine 0.6.  C-reactive protein is 223.7.  Sandoval 

catheter was removed yesterday and patient has been voiding.  He has had minimal

residuals running 150-200.  Patient does not have Sandoval catheter resumed and 

lasts more than 250 per urology.  Patient is to follow-up in the office in one 

week





10/26: Patient is status post I&D of the right shoulder.  Repeat cultures have 

been sent.  Right shoulder aspirate culture is showing beta hemolytic strep.  

Left neck wound culture is showing beta him X strep and gram-negative bacilli.  

Patient has been afebrile, heart rate 94, blood pressure 130/75, pulse ox 90% on

room air.  WBC 8.4, hemoglobin 8.3, platelet count 284.  Discussed case with Dr. Chowdhury recommendations for Rocephin 2 g every day to complete 6 weeks.  Case 

manager has been updated and we'll make arrangements for outpatient IV 

antibiotics.  PICC line has been ordered.  Antibiotics have been transitioned to

Rocephin from cefepime.  He has a large dressing in place to the right shoulder.

 Anticipate probable discharge tomorrow.





10/27: Patient has been afebrile, heart rate 96, blood pressure 132/74, pulse ox

96% on room air.  Heart reveals potassium 2.8 which will be replaced with 120 

mEq of potassium.  Magnesium is 1.6.  Total bilirubin 1.6, AST 31, ALT 57, 

alkaline phosphatase 170.  Patient states he continues to feel well and denies 

any new complaints.  He is anxious to go home.   has set patient up 

with [facility] and [facility] homecare.  PICC line insertion was completed 

yesterday.  Patient will be discharged home today in stable condition.





ASSESSMENT AND PLAN


1.  Generalized debility, weight loss, loss of appetite and weakness secondary 

to cancer and septic right shoulder joint (POA).  


2.  Basal cell skin cancer of the left cheek with metastatic disease to the neck

and lungs status post palliative radiation therapy, under the care of Dr. Posada.  


3.  Bladder cancer status post recent transurethral resection of bladder tumors.

 


4.  Right shoulder pain secondary to right shoulder septic arthritis, synovitis 

status post right shoulder arthroscopic lavage and synovectomy.  


5.  Severe protein calorie malnutrition with weight loss, loss of appetite and 

dysphagia secondary to neck tumor.  


6.  Anemia of chronic disease with possible acute component. 


7.  Hyponatremia secondary to malnutrition.  


8.  Hypertension. 


9.  Metabolic encephalopathy and hallucinations secondary to underlying cancers 

and medical debilitation, resolved.  


10.  Possible vocal cord paralysis.  ENT follow-up outpatient.





Discharge plan: Home with [facility] with IV antibiotics.  





Impression and plan of care have been directed as dictated by the signing 

physician.  Haley [facility] nurse practitioner acting as scribe for signing 

physician





Patient Condition at Discharge: Good





Plan - Discharge Summary


Discharge Rx Participant: No


New Discharge Prescriptions: 


New


   cefTRIAXone [Rocephin] 2 gm IVPB Q24H #42 bag


   Potassium Chloride ER [K-Dur 20] 20 meq PO DAILY #30 tab.er.prt


   Sennosides-Docusate Sodium [Senokot-S] 2 each PO DAILY  tab





Continue


   Metoprolol Tartrate [Lopressor] 12.5 mg PO HS PRN


     PRN Reason: HIGH BLOOD PRESSURE


   Ondansetron [Zofran] 4 - 8 mg PO Q4HR PRN


     PRN Reason: Nausea


   HYDROcodone/APAP 5-325MG [Norco 5-325] 0.5 tab PO Q6HR PRN


     PRN Reason: Pain


   Erivedge 150 mg PO DAILY


   Cyclobenzaprine [Flexeril] 10 mg PO TID #15 tab


   hydrALAZINE HCL [Apresoline] 25 mg PO TID PRN


     PRN Reason: HIGH BLOOD PRESSURE


Discharge Medication List





Metoprolol Tartrate [Lopressor] 12.5 mg PO HS PRN 11/22/19 [History]


Erivedge 150 mg PO DAILY 10/12/20 [History]


HYDROcodone/APAP 5-325MG [Norco 5-325] 0.5 tab PO Q6HR PRN 10/12/20 [History]


Ondansetron [Zofran] 4 - 8 mg PO Q4HR PRN 10/12/20 [History]


Cyclobenzaprine [Flexeril] 10 mg PO TID #15 tab 10/19/20 [Rx]


hydrALAZINE HCL [Apresoline] 25 mg PO TID PRN 10/20/20 [History]


cefTRIAXone [Rocephin] 2 gm IVPB Q24H #42 bag 10/26/20 [Rx]


Potassium Chloride ER [K-Dur 20] 20 meq PO DAILY #30 tab.er.prt 10/27/20 [Rx]


Sennosides-Docusate Sodium [Senokot-S] 2 each PO DAILY  tab 10/27/20 [Rx]








Follow up Appointment(s)/Referral(s): 


Sascha Alex MD [STAFF PHYSICIAN] - 11/18/20 10:00 am


[facility], [NON-STAFF] - 


[facility], [REFERRING] - 


Mina Mata DO [Primary Care Provider] - 1 Week (office will call with 

appointment time)


Will Kim MD [STAFF PHYSICIAN] - 3 Weeks (office will call with appointment 

time)


Jonas Kaur MD [STAFF PHYSICIAN] - 11/11/20 1:30 pm


Ambulatory/Diagnostic Orders: 


Basic Metabolic Panel [LAB.AMB] Location: None Selected


C Reactive Protein [LAB.AMB] Location: None Selected


Complete Blood Count w/diff [LAB.AMB] Location: None Selected


Erythrocyte Sedimentation Rate [LAB.AMB] Location: None Selected


Patient Instructions/Handouts:  Weakness (DC), Anemia (DC)


Activity/Diet/Wound Care/Special Instructions: 


[facility] will deliver IV antibiotic supplies this evening (10/27/2020). 

 


[facility] will call you to set up your first visit starting tomorrow 

(10/28/2020) to begin IV antibiotics.  


Discharge Disposition: HOME WITH HOME HEALTH SERVICES

## 2020-10-27 NOTE — P.PN
Subjective


Progress Note Date: 10/27/20


Principal diagnosis: 





Status post right shoulder arthroscopic lavage/synovectomy





Patient evaluated today at bedside, he is resting comfortably.  No new 

complaints overnight.  Denies any chest pain, shortness of breath, fever or 

chills.





Objective





- Vital Signs


Vital signs: 


                                   Vital Signs











Temp  98.3 F   10/27/20 07:17


 


Pulse  84   10/27/20 07:17


 


Resp  17   10/27/20 07:17


 


BP  162/76   10/27/20 07:17


 


Pulse Ox  98   10/27/20 07:17








                                 Intake & Output











 10/26/20 10/27/20 10/27/20





 18:59 06:59 18:59


 


Intake Total  300 


 


Output Total  2 250


 


Balance  298 -250


 


Intake:   


 


  Oral  300 


 


Output:   


 


  Urine   250


 


  Stool  2 


 


Other:   


 


  Voiding Method Urinal Urinal Urinal


 


  # Voids 2 3 


 


  # Bowel Movements 1 1 














- Exam





Right upper extremity:





Postoperative bandages were removed, incisions are clean, dry and intact.





Minimal soft tissue swelling present throughout the extremity





Sensation to light touch throughout the extremity is intact, radial and ulnar 

pulses 2+ 





Range of motion of the shoulder was not assessed, flexion and extension at the 

elbow and wrist are intact





- Labs


CBC & Chem 7: 


                                 10/26/20 05:29





                                 10/27/20 05:21


Labs: 


                  Abnormal Lab Results - Last 24 Hours (Table)











  10/26/20 10/27/20 Range/Units





  05:29 05:21 


 


Potassium  3.1 L  2.8 L  (3.5-5.5)  mmol/L


 


Anion Gap  14.00 H   (4.00-12.00)  mmol/L


 


BUN  8.0 L   (9.0-27.0)  mg/dL


 


Creatinine  0.5 L  0.5 L  (0.6-1.5)  mg/dL


 


BUN/Creatinine Ratio   22.00 H  (12.00-20.00)  Ratio


 


Glucose   127 H  ()  mg/dL


 


Calcium  8.5 L  8.2 L  (8.7-10.3)  mg/dL


 


Total Bilirubin  2.6 H  1.6 H  (0.3-1.2)  mg/dL


 


ALT  72 H  57 H  (10-49)  U/L


 


Alkaline Phosphatase  142 H  170 H  ()  U/L


 


Total Protein  4.8 L  4.7 L  (6.2-8.2)  g/dL


 


Albumin  2.50 L  2.50 L  (3.80-4.90)  g/dL


 


Albumin/Globulin Ratio  1.09 L  1.14 L  (1.60-3.17)  g/dL








                      Microbiology - Last 24 Hours (Table)











 10/20/20 22:17 Blood Culture - Final





 Blood    No Growth after 144 hours


 


 10/25/20 17:00 Gram Stain - Preliminary





 Shoulder - Right Wound Culture - Preliminary





    Beta Hemolytic Strep Group G


 


 10/25/20 10:27 Blood Culture - Preliminary





 Blood    No Growth after 24 hours


 


 10/24/20 10:08 Gram Stain - Preliminary





 Neck Wound Culture - Preliminary





    Beta Hemolytic Strep Group G





    Pseudomonas aeruginosa














Assessment and Plan


Assessment: 





Status post right shoulder arthroscopic lavage/synovectomy





Right shoulder septic arthritis/synovitis





Multiple medical comorbidities


Plan: 








Wound care instructions were discussed with the patient.  





Use of arm sling as needed 





Patient has a PICC line in place, he'll be receiving IV antibiotics 





Plan for follow-up in the outpatient setting in 2 weeks 


Time with Patient: Less than 30

## 2020-11-13 ENCOUNTER — HOSPITAL ENCOUNTER (EMERGENCY)
Dept: HOSPITAL 47 - EC | Age: 70
Discharge: HOME | End: 2020-11-13
Payer: MEDICARE

## 2020-11-13 VITALS
TEMPERATURE: 97.7 F | SYSTOLIC BLOOD PRESSURE: 173 MMHG | RESPIRATION RATE: 18 BRPM | HEART RATE: 75 BPM | DIASTOLIC BLOOD PRESSURE: 108 MMHG

## 2020-11-13 DIAGNOSIS — Z79.899: ICD-10-CM

## 2020-11-13 DIAGNOSIS — Z95.5: ICD-10-CM

## 2020-11-13 DIAGNOSIS — Z92.3: ICD-10-CM

## 2020-11-13 DIAGNOSIS — C76.0: ICD-10-CM

## 2020-11-13 DIAGNOSIS — Z92.21: ICD-10-CM

## 2020-11-13 DIAGNOSIS — E83.52: Primary | ICD-10-CM

## 2020-11-13 DIAGNOSIS — Z98.890: ICD-10-CM

## 2020-11-13 DIAGNOSIS — I10: ICD-10-CM

## 2020-11-13 DIAGNOSIS — Z85.828: ICD-10-CM

## 2020-11-13 DIAGNOSIS — Z96.641: ICD-10-CM

## 2020-11-13 DIAGNOSIS — C79.11: ICD-10-CM

## 2020-11-13 LAB
ALBUMIN SERPL-MCNC: 3.6 G/DL (ref 3.5–5)
ALP SERPL-CCNC: 191 U/L (ref 38–126)
ALT SERPL-CCNC: 37 U/L (ref 4–49)
ANION GAP SERPL CALC-SCNC: 4 MMOL/L
AST SERPL-CCNC: 54 U/L (ref 17–59)
BASOPHILS # BLD AUTO: 0.1 K/UL (ref 0–0.2)
BASOPHILS NFR BLD AUTO: 1 %
BUN SERPL-SCNC: 16 MG/DL (ref 9–20)
CALCIUM SPEC-MCNC: 15 MG/DL (ref 8.4–10.2)
CHLORIDE SERPL-SCNC: 96 MMOL/L (ref 98–107)
CO2 SERPL-SCNC: 36 MMOL/L (ref 22–30)
EOSINOPHIL # BLD AUTO: 0 K/UL (ref 0–0.7)
EOSINOPHIL NFR BLD AUTO: 1 %
ERYTHROCYTE [DISTWIDTH] IN BLOOD BY AUTOMATED COUNT: 4.12 M/UL (ref 4.3–5.9)
ERYTHROCYTE [DISTWIDTH] IN BLOOD: 17.1 % (ref 11.5–15.5)
GLUCOSE SERPL-MCNC: 111 MG/DL (ref 74–99)
HCT VFR BLD AUTO: 31.4 % (ref 39–53)
HGB BLD-MCNC: 10 GM/DL (ref 13–17.5)
LYMPHOCYTES # SPEC AUTO: 1 K/UL (ref 1–4.8)
LYMPHOCYTES NFR SPEC AUTO: 12 %
MAGNESIUM SPEC-SCNC: 1.9 MG/DL (ref 1.6–2.3)
MCH RBC QN AUTO: 24.2 PG (ref 25–35)
MCHC RBC AUTO-ENTMCNC: 31.8 G/DL (ref 31–37)
MCV RBC AUTO: 76.2 FL (ref 80–100)
MONOCYTES # BLD AUTO: 0.3 K/UL (ref 0–1)
MONOCYTES NFR BLD AUTO: 4 %
NEUTROPHILS # BLD AUTO: 6.3 K/UL (ref 1.3–7.7)
NEUTROPHILS NFR BLD AUTO: 80 %
PLATELET # BLD AUTO: 375 K/UL (ref 150–450)
POTASSIUM SERPL-SCNC: 3.8 MMOL/L (ref 3.5–5.1)
PROT SERPL-MCNC: 8.4 G/DL (ref 6.3–8.2)
SODIUM SERPL-SCNC: 136 MMOL/L (ref 137–145)
WBC # BLD AUTO: 7.9 K/UL (ref 3.8–10.6)

## 2020-11-13 PROCEDURE — 83605 ASSAY OF LACTIC ACID: CPT

## 2020-11-13 PROCEDURE — 36415 COLL VENOUS BLD VENIPUNCTURE: CPT

## 2020-11-13 PROCEDURE — 71045 X-RAY EXAM CHEST 1 VIEW: CPT

## 2020-11-13 PROCEDURE — 96360 HYDRATION IV INFUSION INIT: CPT

## 2020-11-13 PROCEDURE — 83735 ASSAY OF MAGNESIUM: CPT

## 2020-11-13 PROCEDURE — 93005 ELECTROCARDIOGRAM TRACING: CPT

## 2020-11-13 PROCEDURE — 80053 COMPREHEN METABOLIC PANEL: CPT

## 2020-11-13 PROCEDURE — 85025 COMPLETE CBC W/AUTO DIFF WBC: CPT

## 2020-11-13 PROCEDURE — 99285 EMERGENCY DEPT VISIT HI MDM: CPT

## 2020-11-13 NOTE — ED
SOB HPI





- General


Chief Complaint: Shortness of Breath


Stated Complaint: MARLYN


Time Seen by Provider: 11/13/20 00:44


Source: patient, EMS


Mode of arrival: EMS


Limitations: no limitations





- History of Present Illness


Initial Comments: 





Patient is a 70-year-old male, currently undergoing chemo treatment for bladder 

cancer as well as basal cell carcinoma with metastases, presenting to the 

emergency Department with complaints of shortness of breath.  Patient states he 

was sleeping and was awoke as he felt a little short of breath.  Patient's wife 

called EMS and he was placed on oxygen and patient states he feels better.  

Patient's wife states that patient has had a cough with some mild congestion 

noted that started yesterday.  She feels like today the cough is a little worse.

 Patient has not had a fever.  Patient states at this time he feels his normal 

self.  He denies any chest pain, abdominal pain, nausea, vomiting.  He is 

currently on antibiotics, Rocephin, secondary to having his right shoulder 

scoped/cleaned out.  He also takes potassium.  There are no further complaints 

at this time.  Upon arrival to the ER, BP was elevated, rest of vitals normal.  

Patient's wife stated that his blood pressure has been better over the past few 

weeks so she stopped giving him a pressure medication. 





- Related Data


                                Home Medications











 Medication  Instructions  Recorded  Confirmed


 


Metoprolol Tartrate [Lopressor] 12.5 mg PO HS PRN 11/22/19 10/20/20


 


Erivedge 150 mg PO DAILY 10/12/20 10/20/20


 


HYDROcodone/APAP 5-325MG [Norco 0.5 tab PO Q6HR PRN 10/12/20 10/20/20





5-325]   


 


Ondansetron [Zofran] 4 - 8 mg PO Q4HR PRN 10/12/20 10/20/20


 


hydrALAZINE HCL [Apresoline] 25 mg PO TID PRN 10/20/20 10/20/20








                                  Previous Rx's











 Medication  Instructions  Recorded


 


Cyclobenzaprine [Flexeril] 10 mg PO TID #15 tab 10/19/20


 


cefTRIAXone [Rocephin] 2 gm IVPB Q24H #42 bag 10/26/20


 


Potassium Chloride ER [K-Dur 20] 20 meq PO DAILY #30 tab.er.prt 10/27/20


 


Sennosides-Docusate Sodium 2 each PO DAILY  tab 10/27/20





[Senokot-S]  











                                    Allergies











Allergy/AdvReac Type Severity Reaction Status Date / Time


 


No Known Allergies Allergy   Verified 11/13/20 00:34














Review of Systems


ROS Statement: 


Those systems with pertinent positive or pertinent negative responses have been 

documented in the HPI.





ROS Other: All systems not noted in ROS Statement are negative.





Past Medical History


Past Medical History: Cancer, Hypertension, Pneumonia


Additional Past Medical History / Comment(s): hx skin cancer, lung CA, CA in the

lymph nodes.  radiation- last treatment April 14,2020. currently taking oral 

chemo.  constipation, anemia, growth on neck, recent hematuria. bladder CA


History of Any Multi-Drug Resistant Organisms: None Reported


Past Surgical History: Back Surgery, Heart Catheterization, Joint Replacement, 

Orthopedic Surgery


Additional Past Surgical History / Comment(s): PREVIOUS SKIN CA LESIONS REMOVED,

RT HIP REPLACEMENT, left ANKLE ORIF WITH METAL , lung biopsy, neck biopsy,  

heart cath > 10 yrs ago


Past Anesthesia/Blood Transfusion Reactions: Previous Problems w/ Anesthesia


Additional Past Anesthesia/Blood Transfusion Reaction / Comment(s): Pt woke up 

violently during lung biopsy


Past Psychological History: No Psychological Hx Reported


Smoking Status: Never smoker


Past Alcohol Use History: None Reported


Past Drug Use History: None Reported





- Past Family History


  ** Mother


Family Medical History: Cancer, Deep Vein Thrombosis (DVT)


Additional Family Medical History / Comment(s): BREAST CA





General Exam





- General Exam Comments


Initial Comments: 





GENERAL: 


Patient appears slightly cachectic.   Patient is nontoxic and in no acute 

distress.





HEAD: 


Atraumatic, normocephalic.





EYES:


Pupils equal round and reactive to light, extraocular movements intact, sclera 

anicteric, conjunctiva are normal.  Eyelids were unremarkable.





ENT: 


TMs normal, nares patent, oropharynx clear without exudates.  Moist mucous 

membranes.





NECK: 


Normal range of motion, supple without lymphadenopathy or JVD.  Patient has 

extensive carcinoma of the left side of his neck.





LUNGS:


Unlabored respirations.  Breath sounds clear to auscultation bilaterally and 

equal.  No wheezes rales or rhonchi.





HEART:


Regular rate and rhythm without murmurs, rubs or gallops.





ABDOMEN: 


Soft, nontender, normoactive bowel sounds.  No guarding, no rebound.  No masses 

appreciated.





: Deferred 





MUSCULOSKELETAL: 


Normal extremities with adequate strength and normal range of motion, no pitting

or edema.  No clubbing or cyanosis.





NEUROLOGICAL: 


Patient is alert and oriented x 3.  Motor and sensory are also intact.  Cranial 

nerves II through XII grossly intact.  Symmetrical smile.  Normal speech, normal

gait.   





PSYCH:


Normal mood, normal affect.





SKIN:


 Warm, Dry, normal turgor, no rashes or lesions noted.


Limitations: no limitations





Course


                                   Vital Signs











  11/13/20 11/13/20 11/13/20





  00:31 01:30 02:30


 


Temperature 97.5 F L 97.6 F 


 


Pulse Rate 83 86 81


 


Respiratory 16 19 19





Rate   


 


Blood Pressure 180/106 163/101 167/103


 


O2 Sat by Pulse 100 99 99





Oximetry   














  11/13/20





  03:36


 


Temperature 97.7 F


 


Pulse Rate 75


 


Respiratory 18





Rate 


 


Blood Pressure 173/108


 


O2 Sat by Pulse 99





Oximetry 














Medical Decision Making





- Medical Decision Making





Patient is a 70-year-old male currently undergoing treatment for carcinoma of 

the left side of his neck as well as invasive bladder cancer, with metastases, 

presenting with an episode of shortness of breath.  He is currently 

asymptomatic, he feels like his normal self.  His blood pressure was elevated, 

rest of vitals were normal.  Patient's wife states she stopped giving him his 

blood pressure medication.  Patient's labs reveal stable hemoglobin of 10.0, 

white count is normal at 7.9, sodium is 136, potassium is 3.8 calcium is 

elevated at 15.  This was also elevated 3 days ago at 13.4 and again in 10 days 

ago to 11.0.  Patient was given some fluids and has been resting comfortably in 

the ER.  Patient is wanting to go home.  Upon reviewing patient's previous 

progress notes, there was talk of patient starting hospice.  I will have patient

follow up with his PCP and oncologist regarding the calcium level.  Patient is 

in agreement this plan of care.  He will be stable for discharge.  Return 

parameters were discussed with the patient and he verbalized understanding.  

Case discussed with Dr. Ricketts.





- Lab Data


Result diagrams: 


                                 11/13/20 01:07





                                 11/13/20 01:07


                                   Lab Results











  11/13/20 11/13/20 11/13/20 Range/Units





  01:07 01:07 01:07 


 


WBC  7.9    (3.8-10.6)  k/uL


 


RBC  4.12 L    (4.30-5.90)  m/uL


 


Hgb  10.0 L    (13.0-17.5)  gm/dL


 


Hct  31.4 L    (39.0-53.0)  %


 


MCV  76.2 L    (80.0-100.0)  fL


 


MCH  24.2 L    (25.0-35.0)  pg


 


MCHC  31.8    (31.0-37.0)  g/dL


 


RDW  17.1 H    (11.5-15.5)  %


 


Plt Count  375    (150-450)  k/uL


 


MPV  7.2    


 


Neutrophils %  80    %


 


Lymphocytes %  12    %


 


Monocytes %  4    %


 


Eosinophils %  1    %


 


Basophils %  1    %


 


Neutrophils #  6.3    (1.3-7.7)  k/uL


 


Lymphocytes #  1.0    (1.0-4.8)  k/uL


 


Monocytes #  0.3    (0-1.0)  k/uL


 


Eosinophils #  0.0    (0-0.7)  k/uL


 


Basophils #  0.1    (0-0.2)  k/uL


 


Hypochromasia  Moderate    


 


Anisocytosis  Slight    


 


Microcytosis  Slight    


 


Sodium   136 L   (137-145)  mmol/L


 


Potassium   3.8   (3.5-5.1)  mmol/L


 


Chloride   96 L   ()  mmol/L


 


Carbon Dioxide   36 H   (22-30)  mmol/L


 


Anion Gap   4   mmol/L


 


BUN   16   (9-20)  mg/dL


 


Creatinine   1.09   (0.66-1.25)  mg/dL


 


Est GFR (CKD-EPI)AfAm   80   (>60 ml/min/1.73 sqM)  


 


Est GFR (CKD-EPI)NonAf   69   (>60 ml/min/1.73 sqM)  


 


Glucose   111 H   (74-99)  mg/dL


 


Plasma Lactic Acid Javi    1.0  (0.7-2.0)  mmol/L


 


Calcium   15.0 H*   (8.4-10.2)  mg/dL


 


Magnesium   1.9   (1.6-2.3)  mg/dL


 


Total Bilirubin   0.9   (0.2-1.3)  mg/dL


 


AST   54   (17-59)  U/L


 


ALT   37   (4-49)  U/L


 


Alkaline Phosphatase   191 H   ()  U/L


 


Total Protein   8.4 H   (6.3-8.2)  g/dL


 


Albumin   3.6   (3.5-5.0)  g/dL














- EKG Data


EKG Comments: 





Normal sinus rhythm, possible inferior infarct age undetermined, no signs of 

acute ischemia, ventricular rate 81, NC interval 178, .





Disposition


Clinical Impression: 


 Shortness of breath, Hypercalcemia of malignancy, Bladder neoplasm of uncertain

malignant potential





Disposition: HOME SELF-CARE


Condition: Stable


Instructions (If sedation given, give patient instructions):  Dehydration (ED)


Additional Instructions: 


Please return to the Emergency Department if symptoms worsen or any other 

concerns.


Continue to increase water intake.  


Follow-up with oncologist/PCP as discussed.


Is patient prescribed a controlled substance at d/c from ED?: No


Referrals: 


Mina Mata DO [Primary Care Provider] - 1-2 days

## 2020-11-13 NOTE — XR
EXAM:

  XR Chest, 1 View

 

CLINICAL HISTORY:

  ITS.REASON XR Reason: dyspnea

 

TECHNIQUE:

  Frontal view of the chest.

 

COMPARISON:

  Chest radiograph on 10/20/2020

 

FINDINGS:

  Hardware: None.

 

  Lungs/pleura: Scarring in the right upper lung. No focal consolidation. 

No pleural effusion or pneumothorax.

 

  Heart/mediastinum: Normal. No cardiomegaly.

 

  Soft tissues: Unremarkable.

 

  Bones: No acute fracture.  Degenerative changes of the spine.  

Curvature of the spine.

 

  Upper abdomen: Normal.

 

IMPRESSION:   

  No acute disease identified.  Similar scarring in the right upper lung.

## 2021-11-04 NOTE — PN
PROGRESS NOTE



DATE OF SERVICE:

11/25/2019.



REASON FOR FOLLOWUP:

Left-sided neck abscess and cellulitis.



INTERVAL HISTORY:

The patient is currently afebrile.  The patient is breathing comfortably.  Patient

denies having any chest pain.  No cough.  Left-sided neck redness has improved.  Denies

pain.  No chest pain, shortness of breath or cough.  No abdominal pain.  No diarrhea.



PHYSICAL EXAMINATION:

Blood pressure 149/80 with a pulse of 96, temperature 98.3.  He is 92% on room air.

General description is an elderly male up in the bed in no distress.

HEENT examination:  Left lateral neck area did have a wound, drainage slightly

decreased.  Minimal drainage.

LUNGS:  Unlabored breathing.  Clear to auscultation anteriorly.

CARDIOVASCULAR: Heart S1, S2.  Regular rate and rhythm.

ABDOMEN: Soft, no tenderness.



LABS:

Hemoglobin 9.4, white count 7.7, BUN of 6, creatinine 0.64.  Wound culture with

presumptive Staph aureus and Strep.



DIAGNOSTIC IMPRESSION AND PLAN:

Patient with left lung abscess and cellulitis for which the patient had surgical

debridement.  However, the patient currently being referred to [facility] to be

elevated by tertiary care ENT.  Continue with cefepime and Vanco until the patient

evaluated by the ENT service at that facility.  Continue supportive care.





MMODL / IJN: [id number] / Job#: [id number] None known

## 2023-11-12 NOTE — PCN
PROCEDURE NOTE



PREOPERATIVE DIAGNOSIS:

Right upper lobe mass.



POSTOPERATIVE DIAGNOSIS:

Right upper lobe mass.



PROCEDURE:

Navigational bronchoscopy, transbronchial biopsy of right upper lobe lesions.



This procedure was done in the operating room.  The patient underwent a CT scan of the

chest using the Veran protocol.  The CAT scans were reviewed and the 2 right upper lobe

lesions were identified.  One of the lesions was in the apical segment of the right

upper lobe and the second was in the posterior segment of the right upper lobe.  The

lesions were mapped and all of this information was uploaded into a USB drive and later

on brought in to the [facility].



The patient was brought into the operating room.  Under the care of anesthesia, the

patient was intubated and placed on a mechanical ventilator.  The patient was

adequately ventilated and oxygenated.  The patient was intubated by a #9 oral tracheal

tube.  Following that, a flexible bronchoscope was inserted through the orotracheal

tube and into the lower trachea.  The distal trachea bilateral mainstem bronchi, right

upper lobe bronchus, right middle lobe, right lower lobe bronchus, left upper lobe left

lower bronchus along with various segments subsegments were all inspected and were

within normal limits.  No endobronchial tumors or abnormalities noted.  The appropriate

calibration was done using the primary sis and the secondary sis on the left as

reference points.  Following that, using navigational guidance, the bronchoscope was

directed to the apical segment of the right upper lobe and under navigational guidance,

transbronchial biopsies of the right upper lobe lesion was done with 100% accuracy.

Following that, attempted to transbronchial biopsy of the posterior right upper lobe

lesion.  A few transbronchial biopsies were obtained and the lesion a few mm proximity.

No endobronchial bleeding.  Patient tolerated the procedure well without any

complication.  At the completion of the procedure, the patient was extubated.  As the

patient was being extubated, upper trachea was evaluated, was within normal limits.

The vocal cords were also within normal limits.  A quick look at the epiglottis and the

vallecula and arytenoids showed no significant abnormalities.  The patient was

extubated by Anesthesia and the patient was transferred to recovery in stable

condition.  A chest x-ray to follow and if stable, the patient will be discharged home

to follow up on an outpatient basis.





MMODL / IJN: [id number] / Job#: [id number]
Please make an appointment with your primary care doctor in 1 week for your DM control and cardiologist in 1 week